# Patient Record
Sex: MALE | Race: WHITE | NOT HISPANIC OR LATINO
[De-identification: names, ages, dates, MRNs, and addresses within clinical notes are randomized per-mention and may not be internally consistent; named-entity substitution may affect disease eponyms.]

---

## 2018-05-28 ENCOUNTER — TRANSCRIPTION ENCOUNTER (OUTPATIENT)
Age: 63
End: 2018-05-28

## 2018-07-14 ENCOUNTER — EMERGENCY (EMERGENCY)
Facility: HOSPITAL | Age: 63
LOS: 1 days | Discharge: HOME | End: 2018-07-14
Attending: EMERGENCY MEDICINE
Payer: COMMERCIAL

## 2018-07-14 VITALS
HEART RATE: 58 BPM | RESPIRATION RATE: 18 BRPM | SYSTOLIC BLOOD PRESSURE: 192 MMHG | TEMPERATURE: 98 F | OXYGEN SATURATION: 98 % | DIASTOLIC BLOOD PRESSURE: 91 MMHG

## 2018-07-14 PROBLEM — Z00.00 ENCOUNTER FOR PREVENTIVE HEALTH EXAMINATION: Status: ACTIVE | Noted: 2018-07-14

## 2018-07-14 PROCEDURE — 69200 CLEAR OUTER EAR CANAL: CPT | Mod: LT

## 2018-07-14 PROCEDURE — 99282 EMERGENCY DEPT VISIT SF MDM: CPT | Mod: 25

## 2018-07-14 PROCEDURE — 69200 CLEAR OUTER EAR CANAL: CPT

## 2018-07-14 NOTE — ED PROCEDURE NOTE - ATTENDING CONTRIBUTION TO CARE
I was physically present for the E/M service provided. I was physically present for the key portions of the service provided. ANCA.

## 2018-07-14 NOTE — ED PROCEDURE NOTE - CPROC ED FOREIGN BODY DETAIL1
The area was draped and prepped and the anatomic location of the suspected foreign body was explored in a bloodless field./tweezers used to remove rubber hearing-aid tip

## 2018-07-14 NOTE — ED PROVIDER NOTE - OBJECTIVE STATEMENT
62y m w PMHx hearing loss p/w foreign body sensation in L ear after the tip of his hearing-aid dislodged while taking them out this evening. Patient denies any pain, but feels as though he has the tip in his ear; he denies seeing it fall out throughout the day or this evening.

## 2018-07-14 NOTE — ED PROVIDER NOTE - PROGRESS NOTE DETAILS
FB visualized in L ear canal, removed w/ tweezers. No erythema or signs of trauma to ear canal or TM, TM intact. Pt stable for dc- [place]

## 2018-07-14 NOTE — ED PROVIDER NOTE - ATTENDING CONTRIBUTION TO CARE
Left Ear: Ear cancel clear s/p uncomplicated FB removal with tweezers per note, TM clear with normal light reflex.

## 2018-10-29 ENCOUNTER — TRANSCRIPTION ENCOUNTER (OUTPATIENT)
Age: 63
End: 2018-10-29

## 2018-11-13 ENCOUNTER — TRANSCRIPTION ENCOUNTER (OUTPATIENT)
Age: 63
End: 2018-11-13

## 2019-03-08 ENCOUNTER — TRANSCRIPTION ENCOUNTER (OUTPATIENT)
Age: 64
End: 2019-03-08

## 2019-05-23 ENCOUNTER — TRANSCRIPTION ENCOUNTER (OUTPATIENT)
Age: 64
End: 2019-05-23

## 2019-06-04 ENCOUNTER — OUTPATIENT (OUTPATIENT)
Dept: OUTPATIENT SERVICES | Facility: HOSPITAL | Age: 64
LOS: 1 days | End: 2019-06-04
Payer: COMMERCIAL

## 2019-06-04 VITALS
OXYGEN SATURATION: 100 % | TEMPERATURE: 98 F | HEIGHT: 72 IN | DIASTOLIC BLOOD PRESSURE: 92 MMHG | SYSTOLIC BLOOD PRESSURE: 161 MMHG | WEIGHT: 220.9 LBS | RESPIRATION RATE: 16 BRPM | HEART RATE: 66 BPM

## 2019-06-04 DIAGNOSIS — Z87.898 PERSONAL HISTORY OF OTHER SPECIFIED CONDITIONS: Chronic | ICD-10-CM

## 2019-06-04 DIAGNOSIS — Z98.49 CATARACT EXTRACTION STATUS, UNSPECIFIED EYE: Chronic | ICD-10-CM

## 2019-06-04 DIAGNOSIS — Z98.890 OTHER SPECIFIED POSTPROCEDURAL STATES: Chronic | ICD-10-CM

## 2019-06-04 DIAGNOSIS — M86.9 OSTEOMYELITIS, UNSPECIFIED: ICD-10-CM

## 2019-06-04 DIAGNOSIS — Z98.84 BARIATRIC SURGERY STATUS: ICD-10-CM

## 2019-06-04 DIAGNOSIS — Z01.818 ENCOUNTER FOR OTHER PREPROCEDURAL EXAMINATION: ICD-10-CM

## 2019-06-04 DIAGNOSIS — Z98.84 BARIATRIC SURGERY STATUS: Chronic | ICD-10-CM

## 2019-06-04 LAB
ANION GAP SERPL CALC-SCNC: 15 MMOL/L — SIGNIFICANT CHANGE UP (ref 5–17)
BUN SERPL-MCNC: 53 MG/DL — HIGH (ref 7–23)
CALCIUM SERPL-MCNC: 9.4 MG/DL — SIGNIFICANT CHANGE UP (ref 8.4–10.5)
CHLORIDE SERPL-SCNC: 99 MMOL/L — SIGNIFICANT CHANGE UP (ref 96–108)
CO2 SERPL-SCNC: 23 MMOL/L — SIGNIFICANT CHANGE UP (ref 22–31)
CREAT SERPL-MCNC: 3.15 MG/DL — HIGH (ref 0.5–1.3)
GLUCOSE SERPL-MCNC: 147 MG/DL — HIGH (ref 70–99)
HBA1C BLD-MCNC: 7.3 % — HIGH (ref 4–5.6)
HCT VFR BLD CALC: 37.3 % — LOW (ref 39–50)
HGB BLD-MCNC: 13.2 G/DL — SIGNIFICANT CHANGE UP (ref 13–17)
MCHC RBC-ENTMCNC: 31.7 PG — SIGNIFICANT CHANGE UP (ref 27–34)
MCHC RBC-ENTMCNC: 35.4 GM/DL — SIGNIFICANT CHANGE UP (ref 32–36)
MCV RBC AUTO: 89.4 FL — SIGNIFICANT CHANGE UP (ref 80–100)
PLATELET # BLD AUTO: 210 K/UL — SIGNIFICANT CHANGE UP (ref 150–400)
POTASSIUM SERPL-MCNC: 5 MMOL/L — SIGNIFICANT CHANGE UP (ref 3.5–5.3)
POTASSIUM SERPL-SCNC: 5 MMOL/L — SIGNIFICANT CHANGE UP (ref 3.5–5.3)
RBC # BLD: 4.17 M/UL — LOW (ref 4.2–5.8)
RBC # FLD: 12.6 % — SIGNIFICANT CHANGE UP (ref 10.3–14.5)
SODIUM SERPL-SCNC: 137 MMOL/L — SIGNIFICANT CHANGE UP (ref 135–145)
WBC # BLD: 8.24 K/UL — SIGNIFICANT CHANGE UP (ref 3.8–10.5)
WBC # FLD AUTO: 8.24 K/UL — SIGNIFICANT CHANGE UP (ref 3.8–10.5)

## 2019-06-04 PROCEDURE — 80048 BASIC METABOLIC PNL TOTAL CA: CPT

## 2019-06-04 PROCEDURE — 77002 NEEDLE LOCALIZATION BY XRAY: CPT

## 2019-06-04 PROCEDURE — 43999 UNLISTED PROCEDURE STOMACH: CPT

## 2019-06-04 PROCEDURE — 85027 COMPLETE CBC AUTOMATED: CPT

## 2019-06-04 PROCEDURE — G0463: CPT

## 2019-06-04 PROCEDURE — 83036 HEMOGLOBIN GLYCOSYLATED A1C: CPT

## 2019-06-04 RX ORDER — CHLORHEXIDINE GLUCONATE 213 G/1000ML
1 SOLUTION TOPICAL DAILY
Refills: 0 | Status: DISCONTINUED | OUTPATIENT
Start: 2019-06-12 | End: 2019-06-27

## 2019-06-04 RX ORDER — CEFAZOLIN SODIUM 1 G
2000 VIAL (EA) INJECTION ONCE
Refills: 0 | Status: DISCONTINUED | OUTPATIENT
Start: 2019-06-12 | End: 2019-06-27

## 2019-06-04 RX ORDER — SODIUM CHLORIDE 9 MG/ML
3 INJECTION INTRAMUSCULAR; INTRAVENOUS; SUBCUTANEOUS EVERY 8 HOURS
Refills: 0 | Status: DISCONTINUED | OUTPATIENT
Start: 2019-06-12 | End: 2019-06-27

## 2019-06-04 RX ORDER — LIDOCAINE HCL 20 MG/ML
0.2 VIAL (ML) INJECTION ONCE
Refills: 0 | Status: DISCONTINUED | OUTPATIENT
Start: 2019-06-12 | End: 2019-06-27

## 2019-06-04 NOTE — H&P PST ADULT - NSANTHOSAYNRD_GEN_A_CORE
No. KRYSTIAN screening performed.  STOP BANG Legend: 0-2 = LOW Risk; 3-4 = INTERMEDIATE Risk; 5-8 = HIGH Risk

## 2019-06-04 NOTE — H&P PST ADULT - HISTORY OF PRESENT ILLNESS
This is a 63 year old male with PMH: HTN, HLD, Type 2 Diabetes, s/p ( '2010): Lap Band for Morbicd Obesity: current BMI 30.0; * Lap Band deflated ( [facility]) 5-4-19. Current dx: Osteomyelitis Left 3rd Toe. Scheduled: Left Foot Distal Amputation Toe #3. This is a 63 year old male with PMH:  bilateral Hearing Aids, HTN, HLD, Type 2 Diabetes, s/p ( '2010): Lap Band for Morbicd Obesity: current BMI 30.0; * Lap Band deflated ( [facility]) 6-4-19. Current dx: Osteomyelitis Left 3rd Toe. Scheduled: Left Foot Distal Amputation Toe #3.

## 2019-06-04 NOTE — H&P PST ADULT - NSICDXPASTMEDICALHX_GEN_ALL_CORE_FT
PAST MEDICAL HISTORY:  Hard of hearing PAST MEDICAL HISTORY:  Hard of hearing Bilateral hearing Aids    HTN (hypertension)     Hypothyroidism     Osteomyelitis     Type 2 diabetes mellitus

## 2019-06-04 NOTE — H&P PST ADULT - NSICDXPASTSURGICALHX_GEN_ALL_CORE_FT
PAST SURGICAL HISTORY:  No significant past surgical history PAST SURGICAL HISTORY:  H/O laparoscopic adjustable gastric banding ' 2010    History of foreign body aspiration 7/2018  from Left Ear    S/P cataract extraction '08 and ' 2011   Bilateral    S/P partial thyroidectomy '95  benign

## 2019-06-04 NOTE — H&P PST ADULT - OTHER CARE PROVIDERS
Alfredito Zambrano, cardiologist [phone]               Nolberto Spann, endocrinologist [phone]

## 2019-06-11 ENCOUNTER — TRANSCRIPTION ENCOUNTER (OUTPATIENT)
Age: 64
End: 2019-06-11

## 2019-06-12 ENCOUNTER — OUTPATIENT (OUTPATIENT)
Dept: OUTPATIENT SERVICES | Facility: HOSPITAL | Age: 64
LOS: 1 days | End: 2019-06-12
Payer: COMMERCIAL

## 2019-06-12 ENCOUNTER — RESULT REVIEW (OUTPATIENT)
Age: 64
End: 2019-06-12

## 2019-06-12 VITALS
HEART RATE: 70 BPM | OXYGEN SATURATION: 100 % | WEIGHT: 220.9 LBS | RESPIRATION RATE: 18 BRPM | TEMPERATURE: 98 F | DIASTOLIC BLOOD PRESSURE: 79 MMHG | SYSTOLIC BLOOD PRESSURE: 116 MMHG | HEIGHT: 72 IN

## 2019-06-12 VITALS
HEART RATE: 58 BPM | OXYGEN SATURATION: 100 % | RESPIRATION RATE: 16 BRPM | SYSTOLIC BLOOD PRESSURE: 141 MMHG | DIASTOLIC BLOOD PRESSURE: 72 MMHG

## 2019-06-12 DIAGNOSIS — M86.9 OSTEOMYELITIS, UNSPECIFIED: ICD-10-CM

## 2019-06-12 DIAGNOSIS — Z98.890 OTHER SPECIFIED POSTPROCEDURAL STATES: Chronic | ICD-10-CM

## 2019-06-12 DIAGNOSIS — Z87.898 PERSONAL HISTORY OF OTHER SPECIFIED CONDITIONS: Chronic | ICD-10-CM

## 2019-06-12 DIAGNOSIS — Z98.49 CATARACT EXTRACTION STATUS, UNSPECIFIED EYE: Chronic | ICD-10-CM

## 2019-06-12 DIAGNOSIS — Z98.84 BARIATRIC SURGERY STATUS: Chronic | ICD-10-CM

## 2019-06-12 LAB — GLUCOSE BLDC GLUCOMTR-MCNC: 147 MG/DL — HIGH (ref 70–99)

## 2019-06-12 PROCEDURE — 88304 TISSUE EXAM BY PATHOLOGIST: CPT | Mod: 26

## 2019-06-12 PROCEDURE — 88311 DECALCIFY TISSUE: CPT | Mod: 26

## 2019-06-12 RX ORDER — ONDANSETRON 8 MG/1
4 TABLET, FILM COATED ORAL ONCE
Refills: 0 | Status: COMPLETED | OUTPATIENT
Start: 2019-06-12 | End: 2019-06-12

## 2019-06-12 RX ORDER — SODIUM CHLORIDE 9 MG/ML
1000 INJECTION, SOLUTION INTRAVENOUS
Refills: 0 | Status: DISCONTINUED | OUTPATIENT
Start: 2019-06-12 | End: 2019-06-27

## 2019-06-12 RX ORDER — OXYCODONE HYDROCHLORIDE 5 MG/1
5 TABLET ORAL ONCE
Refills: 0 | Status: DISCONTINUED | OUTPATIENT
Start: 2019-06-12 | End: 2019-06-12

## 2019-06-12 RX ADMIN — ONDANSETRON 4 MILLIGRAM(S): 8 TABLET, FILM COATED ORAL at 16:37

## 2019-06-12 RX ADMIN — SODIUM CHLORIDE 3 MILLILITER(S): 9 INJECTION INTRAMUSCULAR; INTRAVENOUS; SUBCUTANEOUS at 13:44

## 2019-06-12 RX ADMIN — CHLORHEXIDINE GLUCONATE 1 APPLICATION(S): 213 SOLUTION TOPICAL at 13:44

## 2019-06-12 RX ADMIN — OXYCODONE HYDROCHLORIDE 5 MILLIGRAM(S): 5 TABLET ORAL at 16:36

## 2019-06-12 NOTE — ASU DISCHARGE PLAN (ADULT/PEDIATRIC) - CARE PROVIDER_API CALL
Parris Falcon (DPM)  Podiatric Medicine and Surgery  [address] [place] [address]  Phone: [phone]  Fax: [phone]  Follow Up Time:

## 2019-06-12 NOTE — ASU PATIENT PROFILE, ADULT - PSH
H/O laparoscopic adjustable gastric banding  ' 2010  History of foreign body aspiration  7/2018  from Left Ear  S/P cataract extraction  '08 and ' 2011   Bilateral  S/P partial thyroidectomy  '95  benign

## 2019-06-12 NOTE — ASU PATIENT PROFILE, ADULT - PMH
Hard of hearing  Bilateral hearing Aids  HTN (hypertension)    Hypothyroidism    Osteomyelitis    Type 2 diabetes mellitus

## 2019-06-12 NOTE — ASU DISCHARGE PLAN (ADULT/PEDIATRIC) - CALL YOUR DOCTOR IF YOU HAVE ANY OF THE FOLLOWING:
Pain not relieved by Medications/Fever greater than (need to indicate Fahrenheit or Celsius)/Swelling that gets worse/Bleeding that does not stop/Numbness, tingling, color or temperature change to extremity

## 2019-06-12 NOTE — PRE-ANESTHESIA EVALUATION ADULT - NSANTHADDINFOFT_GEN_ALL_CORE
Denies CP, SOB, cough, fever, acid reflux Denies CP, SOB, cough, fever, acid reflux  optimized by PCP, EKG sinus arrythmia, RBBB

## 2019-06-12 NOTE — ASU DISCHARGE PLAN (ADULT/PEDIATRIC) - NURSING INSTRUCTIONS
Next dose of narcotic pain meds can be take  according to prescription every 4-6 hrs as prescribed by your doctor.

## 2019-06-13 LAB
GRAM STN FLD: SIGNIFICANT CHANGE UP
GRAM STN FLD: SIGNIFICANT CHANGE UP
SPECIMEN SOURCE: SIGNIFICANT CHANGE UP
SPECIMEN SOURCE: SIGNIFICANT CHANGE UP

## 2019-06-14 LAB
-  AMPICILLIN/SULBACTAM: SIGNIFICANT CHANGE UP
-  CEFAZOLIN: SIGNIFICANT CHANGE UP
-  CLINDAMYCIN: SIGNIFICANT CHANGE UP
-  ERYTHROMYCIN: SIGNIFICANT CHANGE UP
-  GENTAMICIN: SIGNIFICANT CHANGE UP
-  OXACILLIN: SIGNIFICANT CHANGE UP
-  PENICILLIN: SIGNIFICANT CHANGE UP
-  RIFAMPIN: SIGNIFICANT CHANGE UP
-  TETRACYCLINE: SIGNIFICANT CHANGE UP
-  TRIMETHOPRIM/SULFAMETHOXAZOLE: SIGNIFICANT CHANGE UP
-  VANCOMYCIN: SIGNIFICANT CHANGE UP
METHOD TYPE: SIGNIFICANT CHANGE UP

## 2019-06-16 LAB
-  AMPICILLIN/SULBACTAM: SIGNIFICANT CHANGE UP
-  CEFAZOLIN: SIGNIFICANT CHANGE UP
-  CLINDAMYCIN: SIGNIFICANT CHANGE UP
-  ERYTHROMYCIN: SIGNIFICANT CHANGE UP
-  GENTAMICIN: SIGNIFICANT CHANGE UP
-  OXACILLIN: SIGNIFICANT CHANGE UP
-  RIFAMPIN: SIGNIFICANT CHANGE UP
-  TETRACYCLINE: SIGNIFICANT CHANGE UP
-  TRIMETHOPRIM/SULFAMETHOXAZOLE: SIGNIFICANT CHANGE UP
-  VANCOMYCIN: SIGNIFICANT CHANGE UP
METHOD TYPE: SIGNIFICANT CHANGE UP

## 2019-06-17 LAB
CULTURE RESULTS: SIGNIFICANT CHANGE UP
CULTURE RESULTS: SIGNIFICANT CHANGE UP
ORGANISM # SPEC MICROSCOPIC CNT: SIGNIFICANT CHANGE UP
SPECIMEN SOURCE: SIGNIFICANT CHANGE UP
SPECIMEN SOURCE: SIGNIFICANT CHANGE UP

## 2019-06-17 PROCEDURE — 88304 TISSUE EXAM BY PATHOLOGIST: CPT

## 2019-06-17 PROCEDURE — 88311 DECALCIFY TISSUE: CPT

## 2019-06-17 PROCEDURE — 87186 SC STD MICRODIL/AGAR DIL: CPT

## 2019-06-17 PROCEDURE — 28124 PARTIAL REMOVAL OF TOE: CPT | Mod: T2

## 2019-06-17 PROCEDURE — 82962 GLUCOSE BLOOD TEST: CPT

## 2019-06-17 PROCEDURE — 87070 CULTURE OTHR SPECIMN AEROBIC: CPT

## 2019-10-24 PROBLEM — E03.9 HYPOTHYROIDISM, UNSPECIFIED: Chronic | Status: ACTIVE | Noted: 2019-06-04

## 2019-10-24 PROBLEM — E11.9 TYPE 2 DIABETES MELLITUS WITHOUT COMPLICATIONS: Chronic | Status: ACTIVE | Noted: 2019-06-04

## 2019-10-24 PROBLEM — H91.90 UNSPECIFIED HEARING LOSS, UNSPECIFIED EAR: Chronic | Status: ACTIVE | Noted: 2018-07-14

## 2019-10-24 PROBLEM — M86.9 OSTEOMYELITIS, UNSPECIFIED: Chronic | Status: ACTIVE | Noted: 2019-06-04

## 2019-10-24 PROBLEM — I10 ESSENTIAL (PRIMARY) HYPERTENSION: Chronic | Status: ACTIVE | Noted: 2019-06-04

## 2019-10-25 ENCOUNTER — APPOINTMENT (OUTPATIENT)
Dept: ULTRASOUND IMAGING | Facility: CLINIC | Age: 64
End: 2019-10-25
Payer: COMMERCIAL

## 2019-10-25 ENCOUNTER — OUTPATIENT (OUTPATIENT)
Dept: OUTPATIENT SERVICES | Facility: HOSPITAL | Age: 64
LOS: 1 days | End: 2019-10-25
Payer: COMMERCIAL

## 2019-10-25 DIAGNOSIS — Z00.8 ENCOUNTER FOR OTHER GENERAL EXAMINATION: ICD-10-CM

## 2019-10-25 DIAGNOSIS — Z98.890 OTHER SPECIFIED POSTPROCEDURAL STATES: Chronic | ICD-10-CM

## 2019-10-25 DIAGNOSIS — Z98.49 CATARACT EXTRACTION STATUS, UNSPECIFIED EYE: Chronic | ICD-10-CM

## 2019-10-25 DIAGNOSIS — Z98.84 BARIATRIC SURGERY STATUS: Chronic | ICD-10-CM

## 2019-10-25 DIAGNOSIS — Z87.898 PERSONAL HISTORY OF OTHER SPECIFIED CONDITIONS: Chronic | ICD-10-CM

## 2019-10-25 PROCEDURE — 76775 US EXAM ABDO BACK WALL LIM: CPT

## 2019-10-25 PROCEDURE — 76775 US EXAM ABDO BACK WALL LIM: CPT | Mod: 26

## 2020-03-19 NOTE — ASU PATIENT PROFILE, ADULT - PAIN RATING AT REST
0 Home Suture Removal Text: Patient was provided instructions on removing sutures and will remove their sutures at home.  If they have any questions or difficulties they will call the office.

## 2020-11-17 ENCOUNTER — APPOINTMENT (OUTPATIENT)
Dept: TRANSPLANT | Facility: CLINIC | Age: 65
End: 2020-11-17
Payer: COMMERCIAL

## 2020-11-17 ENCOUNTER — APPOINTMENT (OUTPATIENT)
Dept: TRANSPLANT | Facility: CLINIC | Age: 65
End: 2020-11-17

## 2020-11-17 ENCOUNTER — LABORATORY RESULT (OUTPATIENT)
Age: 65
End: 2020-11-17

## 2020-11-17 ENCOUNTER — APPOINTMENT (OUTPATIENT)
Dept: NEPHROLOGY | Facility: CLINIC | Age: 65
End: 2020-11-17
Payer: COMMERCIAL

## 2020-11-17 VITALS
WEIGHT: 224 LBS | HEART RATE: 63 BPM | SYSTOLIC BLOOD PRESSURE: 166 MMHG | TEMPERATURE: 97.5 F | HEIGHT: 72 IN | DIASTOLIC BLOOD PRESSURE: 80 MMHG | RESPIRATION RATE: 14 BRPM | BODY MASS INDEX: 30.34 KG/M2

## 2020-11-17 VITALS
BODY MASS INDEX: 30.34 KG/M2 | HEART RATE: 63 BPM | OXYGEN SATURATION: 100 % | WEIGHT: 224 LBS | HEIGHT: 72 IN | TEMPERATURE: 97.5 F

## 2020-11-17 PROCEDURE — 99205 OFFICE O/P NEW HI 60 MIN: CPT

## 2020-11-17 PROCEDURE — 99072 ADDL SUPL MATRL&STAF TM PHE: CPT

## 2020-11-17 PROCEDURE — 99203 OFFICE O/P NEW LOW 30 MIN: CPT | Mod: 25

## 2020-11-17 NOTE — HISTORY OF PRESENT ILLNESS
[FreeTextEntry1] : 65 years old male, born in [place].\par Patient has known CKD (), on follow up with  is here for pre kidney transplant evaluation. \par He is a preemptive candidate.\par He has known DM (age 37) Not on insulin ; HTN (age 47). H/o Hyperlipidemia, controlled on statin / \par No h/o Gout\par Had gastric lap band (), now deflated (2019)\par No known h/o kidney stone/ Prostatism.\par No hematuria/Transfusions\par Nocturia: once may be\par Has no h/o  UTI. H/o pneumonia once in \par Has arrhythmia on metoprolol.\par No known h/o active CAD/CVA/PVD/DVT/neoplasia/active infections/bleeding.\par Scheduled for bone marrow on Dec 1st, 2020 for evaluation to r/o myeloma\par Reports no medication allergies. But intolerant to aspirin, Vasotec, metformin (aspirin bleeding, Vasotec and metformin elevated liver enzymes). Does not take any blood thinners. No known h/o tuberculosis or hepatitis.\par Most recent hospitalization/for:2019 left foot 3rd toe tip amputation, for infection\par Past surgeries:\par Lap Band ()\par Left 3rd toe tip excision\par Thyroid excision (- benign)\par Kidney biopsy  (proteinuria, obesity)\par No history of kidney/ bladder/ prostate surgery.\par Non smoker. Quit in , < 1ppd prior\par Fam: Parents are . Father -  at 70 Mother-  at 60  Siblings- 1 sister  at 66; Araceli aged 65, healthy.\par Children: None\par Lives with wife, Bruce.\par No family history of kidney disease- Mother had deformed kidney, mother's cousin had CKD Sister had kidney transplant in  and  in \par Independent for ADL\par Able to walk 10 blocks, can climb stairs without difficulty.\par ROS: Has h/o shortness of breath on exertion. Has h/o Sleep apnea.Does not use CPAP.  On Thyroid supplement after excision.\par Functional/employment status: Works full time for [employer]\par Dialysis history:None\par Kidney Biopsy:in the \par Potential Live donors: Sister, Araceli Cardoso\par \par Prior Studies:\par Cardiology:[facility]\par Cancer Screen:Scheduled for bone marrow\par Consultants:Robert Weiss (GI- Colonoscopy in 2018); Dr. Reba Javier (hematologist)\par Primary MD:Dr. Randle\par \par Meds:\par Repaglinide\par Losartan\par Metoprolol\par Levothyroxine\par Sod bicarb\par Simvastatin\par

## 2020-11-17 NOTE — HISTORY OF PRESENT ILLNESS
[Diabetes Mellitus] : Diabetes Mellitus [Hypertension] : Hypertension [Previous Kidney Transplant] : no previous kidney transplant [Cardiac History] : cardiac history [TextBox_42] : 65 years old male, with CKD presenting today for kidney transplant evaluation\par he has long history of diabetes; for over 40 years\par also long history of hypertension; for about 20 years\par morbid obesity; had gastric lap band in  and lost about 130 lbs\par he had a kidney biopsy in ; was told the kidney disease is secondary to the obesity\par he had a cardiac cath in  (for abnormal stress test) and was told it was "clean"\par thyroidectomy in - benign\par in 2019 he had a foot infection involving the left middle tow and he had an amputation of the distal phalanx of the left middle toe\par cataract in  and \par he is currently following with hematology  for abnormal protein in blood and planned for a bone marrow biopsy in two weeks to rule out multiple myeloma\par he gives history of GI bleeding following taking aspirin; and he had EGD and was told to stop taking aspirin \par \par family history; both parents  and both had DM and HTN and father had CAD. he has two sisters one of them had a kidney transplant. \par \par he is  and has no children; he works in finance. he stopped smoking in  [TextBox_16] : pre emptive [TextBox_24] : 2000 [TextBox_28] : 40 years ago

## 2020-11-17 NOTE — PHYSICAL EXAM
[General Appearance - Alert] : alert [General Appearance - In No Acute Distress] : in no acute distress [Sclera] : the sclera and conjunctiva were normal [PERRL With Normal Accommodation] : pupils were equal in size, round, and reactive to light [Extraocular Movements] : extraocular movements were intact [Outer Ear] : the ears and nose were normal in appearance [Oropharynx] : the oropharynx was normal [Neck Appearance] : the appearance of the neck was normal [Neck Cervical Mass (___cm)] : no neck mass was observed [Jugular Venous Distention Increased] : there was no jugular-venous distention [Thyroid Diffuse Enlargement] : the thyroid was not enlarged [Thyroid Nodule] : there were no palpable thyroid nodules [Auscultation Breath Sounds / Voice Sounds] : lungs were clear to auscultation bilaterally [Full Pulse] : the pedal pulses are present [Bowel Sounds] : normal bowel sounds [Abdomen Soft] : soft [Abdomen Tenderness] : non-tender [Cervical Lymph Nodes Enlarged Posterior Bilaterally] : posterior cervical [Cervical Lymph Nodes Enlarged Anterior Bilaterally] : anterior cervical [Supraclavicular Lymph Nodes Enlarged Bilaterally] : supraclavicular [Axillary Lymph Nodes Enlarged Bilaterally] : axillary [Femoral Lymph Nodes Enlarged Bilaterally] : femoral [Inguinal Lymph Nodes Enlarged Bilaterally] : inguinal [Involuntary Movements] : no involuntary movements were seen [] : no rash [FreeTextEntry1] : no acute signs [Oriented To Time, Place, And Person] : oriented to person, place, and time [Impaired Insight] : insight and judgment were intact [Affect] : the affect was normal

## 2020-11-17 NOTE — ASSESSMENT
[Good candidate] : a good candidate. We should proceed with our protocol for evaluation for kidney transplantation. [FreeTextEntry1] : 65 years old male with long history of diabetes and hypertension\par he is not on dialysis yet\par he is a reasonable candidate for evaluation\par he will be better served with a living donor transplantation

## 2020-11-17 NOTE — PHYSICAL EXAM
[LE Edema] : LE edema [] : right dorsalis pedis palpable [Normal] : normal [FreeTextEntry1] : scar of thyroidectomy [TextBox_25] : lap band pump [TextBox_34] : mild edema. well healed stump of toe amputation

## 2020-11-17 NOTE — ASSESSMENT
[FreeTextEntry1] : .Mr. WILSON 65 year He is evaluated for kidney transplantation.\par Pre transplant/ESRD: Patient will benefit from renal allotransplantation. Will need to report of bone marrow study prior to proceeding with transplant\par Medical risks: Cardiovascular, cancer screening.\par Diabetes Mellitus: Discussed implications. Continue follow up with primary physicians\par Hypertension: Discussed implications. Continue follow up with primary physicians.\par Cardiac risk:  will get further evaluation; echo, stress test; Reviewed cardiovascular risk reduction strategies\par Cancer screening: PSA .  Colon saji screening. No known h/o neoplastic disease\par ID: Serology for acute and chronic viral infections. Screening for latent TB \par Imaging: Renal/abdominal /chest /Iliac/ carotids imaging/ Aneurysm screening\par Consults: Nutrition, social work, cardiology, Transplant surgery.\par Reviewed factors affecting survival and morbidity while on wait list and reviewed adelaide-operative and long-term risk factors affecting outcome in kidney transplantation.\par Details of transplant surgery, immunosuppression and its complications and benefits of live donor transplantation as well as variability in wait times across regions and multiple listing were discussed. KDPI >85% and PHS high risk criteria donors were discussed. Discussed factors affecting morbidity and mortality while on hemodialysis.\par Patient has potential live donor (sister ) at present. \par Will proceed with completing/ updating work up and listing for transplant/ live donor transplant once work up is reviewed and found to be ok.\par

## 2020-11-18 ENCOUNTER — NON-APPOINTMENT (OUTPATIENT)
Age: 65
End: 2020-11-18

## 2020-11-18 DIAGNOSIS — Z78.9 OTHER SPECIFIED HEALTH STATUS: ICD-10-CM

## 2020-11-18 DIAGNOSIS — Z82.49 FAMILY HISTORY OF ISCHEMIC HEART DISEASE AND OTHER DISEASES OF THE CIRCULATORY SYSTEM: ICD-10-CM

## 2020-11-18 DIAGNOSIS — I45.9 CONDUCTION DISORDER, UNSPECIFIED: ICD-10-CM

## 2020-11-18 DIAGNOSIS — Z82.3 FAMILY HISTORY OF STROKE: ICD-10-CM

## 2020-11-18 LAB
ABO + RH PNL BLD: NORMAL
ABO + RH PNL BLD: NORMAL
ALBUMIN SERPL ELPH-MCNC: 4.6 G/DL
ALP BLD-CCNC: 129 U/L
ALT SERPL-CCNC: 23 U/L
ANION GAP SERPL CALC-SCNC: 14 MMOL/L
APPEARANCE: CLEAR
AST SERPL-CCNC: 23 U/L
BACTERIA: NEGATIVE
BASOPHILS # BLD AUTO: 0.02 K/UL
BASOPHILS NFR BLD AUTO: 0.3 %
BILIRUB SERPL-MCNC: 0.4 MG/DL
BILIRUBIN URINE: NEGATIVE
BLOOD URINE: NORMAL
BUN SERPL-MCNC: 59 MG/DL
C PEPTIDE SERPL-MCNC: 6.8 NG/ML
CALCIUM SERPL-MCNC: 9.4 MG/DL
CHLORIDE SERPL-SCNC: 99 MMOL/L
CMV IGG SERPL QL: <0.2 U/ML
CMV IGG SERPL-IMP: NEGATIVE
CO2 SERPL-SCNC: 24 MMOL/L
COLOR: NORMAL
CREAT SERPL-MCNC: 3.92 MG/DL
CREAT SPEC-SCNC: 83 MG/DL
CREAT/PROT UR: 5.4 RATIO
EBV EA AB SER IA-ACNC: 9.1 U/ML
EBV EA AB TITR SER IF: POSITIVE
EBV EA IGG SER QL IA: 373 U/ML
EBV EA IGG SER-ACNC: ABNORMAL
EBV EA IGM SER IA-ACNC: NEGATIVE
EBV PATRN SPEC IB-IMP: NORMAL
EBV VCA IGG SER IA-ACNC: 267 U/ML
EBV VCA IGM SER QL IA: <10 U/ML
EOSINOPHIL # BLD AUTO: 0.08 K/UL
EOSINOPHIL NFR BLD AUTO: 1 %
EPSTEIN-BARR VIRUS CAPSID ANTIGEN IGG: POSITIVE
ESTIMATED AVERAGE GLUCOSE: 117 MG/DL
GLUCOSE QUALITATIVE U: NORMAL
GLUCOSE SERPL-MCNC: 119 MG/DL
HAV IGM SER QL: NONREACTIVE
HBA1C MFR BLD HPLC: 5.7 %
HBV CORE IGG+IGM SER QL: NONREACTIVE
HBV SURFACE AB SER QL: NONREACTIVE
HBV SURFACE AB SERPL IA-ACNC: <3 MIU/ML
HBV SURFACE AG SER QL: NONREACTIVE
HCT VFR BLD CALC: 34.3 %
HCV AB SER QL: NONREACTIVE
HCV S/CO RATIO: 0.07 S/CO
HGB BLD-MCNC: 11.6 G/DL
HIV1+2 AB SPEC QL IA.RAPID: NONREACTIVE
HSV 1+2 IGG SER IA-IMP: NEGATIVE
HSV 1+2 IGG SER IA-IMP: NEGATIVE
HSV1 IGG SER QL: 0.12 INDEX
HSV2 IGG SER QL: 0.07 INDEX
HYALINE CASTS: 2 /LPF
IMM GRANULOCYTES NFR BLD AUTO: 0.3 %
KETONES URINE: NEGATIVE
LEUKOCYTE ESTERASE URINE: NEGATIVE
LYMPHOCYTES # BLD AUTO: 1.11 K/UL
LYMPHOCYTES NFR BLD AUTO: 14.3 %
MAGNESIUM SERPL-MCNC: 2.3 MG/DL
MAN DIFF?: NORMAL
MCHC RBC-ENTMCNC: 32 PG
MCHC RBC-ENTMCNC: 33.8 GM/DL
MCV RBC AUTO: 94.8 FL
MICROSCOPIC-UA: NORMAL
MONOCYTES # BLD AUTO: 0.29 K/UL
MONOCYTES NFR BLD AUTO: 3.7 %
NEUTROPHILS # BLD AUTO: 6.24 K/UL
NEUTROPHILS NFR BLD AUTO: 80.4 %
NITRITE URINE: NEGATIVE
PH URINE: 6.5
PHOSPHATE SERPL-MCNC: 4.1 MG/DL
PLATELET # BLD AUTO: 158 K/UL
POTASSIUM SERPL-SCNC: 4.1 MMOL/L
PROT SERPL-MCNC: 7.5 G/DL
PROT UR-MCNC: 444 MG/DL
PROTEIN URINE: ABNORMAL
PSA SERPL-MCNC: 0.67 NG/ML
RBC # BLD: 3.62 M/UL
RBC # FLD: 13 %
RED BLOOD CELLS URINE: 3 /HPF
RUBV IGG FLD-ACNC: >33 INDEX
RUBV IGG SER-IMP: POSITIVE
SARS-COV-2 IGG SERPL IA-ACNC: 0.09 INDEX
SARS-COV-2 IGG SERPL QL IA: NEGATIVE
SODIUM SERPL-SCNC: 138 MMOL/L
SPECIFIC GRAVITY URINE: 1.01
SQUAMOUS EPITHELIAL CELLS: 1 /HPF
T GONDII AB SER-IMP: POSITIVE
T GONDII IGG SER QL: 146 IU/ML
T PALLIDUM AB SER QL IA: NEGATIVE
URATE SERPL-MCNC: 8.8 MG/DL
UROBILINOGEN URINE: NORMAL
VZV AB TITR SER: POSITIVE
VZV IGG SER IF-ACNC: 2913 INDEX
WBC # FLD AUTO: 7.76 K/UL
WHITE BLOOD CELLS URINE: 1 /HPF

## 2020-11-18 RX ORDER — MULTIVITAMIN
TABLET ORAL DAILY
Refills: 0 | Status: ACTIVE | COMMUNITY

## 2020-11-18 RX ORDER — LEVOTHYROXINE SODIUM 0.12 MG/1
125 TABLET ORAL DAILY
Refills: 0 | Status: ACTIVE | COMMUNITY
Start: 2020-10-25

## 2020-11-18 RX ORDER — BIOTIN 10 MG
TABLET ORAL DAILY
Refills: 0 | Status: ACTIVE | COMMUNITY

## 2020-11-18 RX ORDER — FOLIC ACID 20 MG
CAPSULE ORAL DAILY
Refills: 0 | Status: ACTIVE | COMMUNITY

## 2020-11-18 RX ORDER — BLOOD SUGAR DIAGNOSTIC
STRIP MISCELLANEOUS
Qty: 200 | Refills: 0 | Status: ACTIVE | COMMUNITY
Start: 2020-09-12

## 2020-11-18 RX ORDER — LANCETS
EACH MISCELLANEOUS
Qty: 204 | Refills: 0 | Status: ACTIVE | COMMUNITY
Start: 2020-09-10

## 2020-11-19 ENCOUNTER — NON-APPOINTMENT (OUTPATIENT)
Age: 65
End: 2020-11-19

## 2020-11-19 ENCOUNTER — APPOINTMENT (OUTPATIENT)
Dept: CARDIOLOGY | Facility: CLINIC | Age: 65
End: 2020-11-19
Payer: COMMERCIAL

## 2020-11-19 VITALS
BODY MASS INDEX: 30.88 KG/M2 | HEIGHT: 72 IN | OXYGEN SATURATION: 100 % | HEART RATE: 65 BPM | SYSTOLIC BLOOD PRESSURE: 189 MMHG | WEIGHT: 228 LBS | DIASTOLIC BLOOD PRESSURE: 80 MMHG | TEMPERATURE: 96.9 F

## 2020-11-19 DIAGNOSIS — I10 ESSENTIAL (PRIMARY) HYPERTENSION: ICD-10-CM

## 2020-11-19 LAB
M TB IFN-G BLD-IMP: NEGATIVE
QUANTIFERON TB PLUS MITOGEN MINUS NIL: 7.58 IU/ML
QUANTIFERON TB PLUS NIL: 0.02 IU/ML
QUANTIFERON TB PLUS TB1 MINUS NIL: 0 IU/ML
QUANTIFERON TB PLUS TB2 MINUS NIL: -0.01 IU/ML

## 2020-11-19 PROCEDURE — 99203 OFFICE O/P NEW LOW 30 MIN: CPT

## 2020-11-19 PROCEDURE — 93000 ELECTROCARDIOGRAM COMPLETE: CPT

## 2020-11-20 LAB — EBV DNA SERPL NAA+PROBE-ACNC: NOT DETECTED IU/ML

## 2020-12-11 ENCOUNTER — OUTPATIENT (OUTPATIENT)
Dept: OUTPATIENT SERVICES | Facility: HOSPITAL | Age: 65
LOS: 1 days | End: 2020-12-11

## 2020-12-11 ENCOUNTER — APPOINTMENT (OUTPATIENT)
Dept: CV DIAGNOSTICS | Facility: HOSPITAL | Age: 65
End: 2020-12-11
Payer: COMMERCIAL

## 2020-12-11 DIAGNOSIS — Z87.898 PERSONAL HISTORY OF OTHER SPECIFIED CONDITIONS: Chronic | ICD-10-CM

## 2020-12-11 DIAGNOSIS — Z98.84 BARIATRIC SURGERY STATUS: Chronic | ICD-10-CM

## 2020-12-11 DIAGNOSIS — Z01.818 ENCOUNTER FOR OTHER PREPROCEDURAL EXAMINATION: ICD-10-CM

## 2020-12-11 DIAGNOSIS — Z98.890 OTHER SPECIFIED POSTPROCEDURAL STATES: Chronic | ICD-10-CM

## 2020-12-11 DIAGNOSIS — Z98.49 CATARACT EXTRACTION STATUS, UNSPECIFIED EYE: Chronic | ICD-10-CM

## 2020-12-11 PROCEDURE — 93018 CV STRESS TEST I&R ONLY: CPT | Mod: GC

## 2020-12-11 PROCEDURE — 78452 HT MUSCLE IMAGE SPECT MULT: CPT | Mod: 26

## 2020-12-11 PROCEDURE — 93016 CV STRESS TEST SUPVJ ONLY: CPT | Mod: GC

## 2020-12-14 NOTE — PHYSICAL EXAM
[General Appearance - Well Developed] : well developed [Normal Appearance] : normal appearance [Well Groomed] : well groomed [General Appearance - Well Nourished] : well nourished [No Deformities] : no deformities [General Appearance - In No Acute Distress] : no acute distress [Normal Conjunctiva] : the conjunctiva exhibited no abnormalities [Eyelids - No Xanthelasma] : the eyelids demonstrated no xanthelasmas [Normal Oral Mucosa] : normal oral mucosa [No Oral Pallor] : no oral pallor [No Oral Cyanosis] : no oral cyanosis [Heart Rate And Rhythm] : heart rate and rhythm were normal [Heart Sounds] : normal S1 and S2 [Murmurs] : no murmurs present [Respiration, Rhythm And Depth] : normal respiratory rhythm and effort [Exaggerated Use Of Accessory Muscles For Inspiration] : no accessory muscle use [Auscultation Breath Sounds / Voice Sounds] : lungs were clear to auscultation bilaterally [Abdomen Soft] : soft [Abdomen Tenderness] : non-tender [Abnormal Walk] : normal gait [Cyanosis, Localized] : no localized cyanosis [Skin Color & Pigmentation] : normal skin color and pigmentation [] : no rash [Oriented To Time, Place, And Person] : oriented to person, place, and time [No Anxiety] : not feeling anxious [FreeTextEntry1] : bilateral trace LE edema

## 2020-12-14 NOTE — ADDENDUM
[FreeTextEntry1] : CARDIAC TESTING:\par 12/11/20 (exercise tetrofosmin): 4 METS, 96% MPHR, 04:20 min, HTN response 199/93 mmHg, SOB, no evidence of infarction or inducible ischemia, LVEF 56%\par 07/30/20 ECHO: mild MAC, mild LAE, diastolic dysfunction, LVEF 73%\par \par CARDIAC CLEARANCE:\par At this time, patient is considered an acceptable risk from a cardiac standpoint for renal transplant. Follow up annually pre-transplant.

## 2020-12-14 NOTE — HISTORY OF PRESENT ILLNESS
[FreeTextEntry1] : Patient with DM, HTN, and CKD. Conditions have been stable. Currently doing okay and denies chest pain, shortness of breath or palpitations.

## 2020-12-14 NOTE — DISCUSSION/SUMMARY
[Hypertension] : hypertension [FreeTextEntry1] : \par Currently stable from a cardiovascular standpoint. Hypertensive today. Slight volume overload. No ischemic or CHF symptoms. Patient not currently on dialysis. Continue current medications. ECG completed today and reviewed (findings as noted above). Will schedule an exercise nuclear stress test to rule out any significant CAD. Pending test results, I will make further recommendations. Patient to have recent echo forwarded for review. Patient to have stress testing with primary cardiologist.

## 2020-12-15 ENCOUNTER — APPOINTMENT (OUTPATIENT)
Dept: CT IMAGING | Facility: IMAGING CENTER | Age: 65
End: 2020-12-15
Payer: COMMERCIAL

## 2020-12-15 ENCOUNTER — APPOINTMENT (OUTPATIENT)
Dept: RADIOLOGY | Facility: IMAGING CENTER | Age: 65
End: 2020-12-15
Payer: COMMERCIAL

## 2020-12-15 ENCOUNTER — RESULT REVIEW (OUTPATIENT)
Age: 65
End: 2020-12-15

## 2020-12-15 ENCOUNTER — OUTPATIENT (OUTPATIENT)
Dept: OUTPATIENT SERVICES | Facility: HOSPITAL | Age: 65
LOS: 1 days | End: 2020-12-15
Payer: COMMERCIAL

## 2020-12-15 DIAGNOSIS — Z00.8 ENCOUNTER FOR OTHER GENERAL EXAMINATION: ICD-10-CM

## 2020-12-15 DIAGNOSIS — Z98.49 CATARACT EXTRACTION STATUS, UNSPECIFIED EYE: Chronic | ICD-10-CM

## 2020-12-15 DIAGNOSIS — Z98.890 OTHER SPECIFIED POSTPROCEDURAL STATES: Chronic | ICD-10-CM

## 2020-12-15 DIAGNOSIS — Z98.84 BARIATRIC SURGERY STATUS: Chronic | ICD-10-CM

## 2020-12-15 DIAGNOSIS — Z87.898 PERSONAL HISTORY OF OTHER SPECIFIED CONDITIONS: Chronic | ICD-10-CM

## 2020-12-15 PROCEDURE — 74176 CT ABD & PELVIS W/O CONTRAST: CPT | Mod: 26

## 2020-12-15 PROCEDURE — 74176 CT ABD & PELVIS W/O CONTRAST: CPT

## 2020-12-15 PROCEDURE — 71046 X-RAY EXAM CHEST 2 VIEWS: CPT | Mod: 26

## 2020-12-15 PROCEDURE — 71046 X-RAY EXAM CHEST 2 VIEWS: CPT

## 2020-12-16 ENCOUNTER — APPOINTMENT (OUTPATIENT)
Dept: CARDIOLOGY | Facility: CLINIC | Age: 65
End: 2020-12-16

## 2020-12-25 ENCOUNTER — TRANSCRIPTION ENCOUNTER (OUTPATIENT)
Age: 65
End: 2020-12-25

## 2021-03-22 ENCOUNTER — APPOINTMENT (OUTPATIENT)
Dept: TRANSPLANT | Facility: CLINIC | Age: 66
End: 2021-03-22

## 2021-04-22 ENCOUNTER — APPOINTMENT (OUTPATIENT)
Dept: TRANSPLANT | Facility: CLINIC | Age: 66
End: 2021-04-22

## 2021-04-23 ENCOUNTER — APPOINTMENT (OUTPATIENT)
Dept: TRANSPLANT | Facility: CLINIC | Age: 66
End: 2021-04-23
Payer: COMMERCIAL

## 2021-04-23 PROCEDURE — 99001T: CUSTOM

## 2021-05-21 ENCOUNTER — APPOINTMENT (OUTPATIENT)
Dept: TRANSPLANT | Facility: CLINIC | Age: 66
End: 2021-05-21
Payer: COMMERCIAL

## 2021-05-21 ENCOUNTER — APPOINTMENT (OUTPATIENT)
Dept: TRANSPLANT | Facility: CLINIC | Age: 66
End: 2021-05-21

## 2021-05-21 PROCEDURE — 99001T: CUSTOM

## 2021-05-21 PROCEDURE — 86832 HLA CLASS I HIGH DEFIN QUAL: CPT

## 2021-05-21 PROCEDURE — 86833 HLA CLASS II HIGH DEFIN QUAL: CPT

## 2021-06-22 ENCOUNTER — APPOINTMENT (OUTPATIENT)
Dept: TRANSPLANT | Facility: CLINIC | Age: 66
End: 2021-06-22

## 2021-06-23 ENCOUNTER — APPOINTMENT (OUTPATIENT)
Dept: TRANSPLANT | Facility: CLINIC | Age: 66
End: 2021-06-23
Payer: COMMERCIAL

## 2021-06-23 PROCEDURE — 99001T: CUSTOM

## 2021-07-22 ENCOUNTER — APPOINTMENT (OUTPATIENT)
Dept: TRANSPLANT | Facility: CLINIC | Age: 66
End: 2021-07-22

## 2021-07-23 ENCOUNTER — APPOINTMENT (OUTPATIENT)
Dept: TRANSPLANT | Facility: CLINIC | Age: 66
End: 2021-07-23
Payer: COMMERCIAL

## 2021-07-23 PROCEDURE — 99001T: CUSTOM

## 2021-08-20 ENCOUNTER — APPOINTMENT (OUTPATIENT)
Dept: TRANSPLANT | Facility: CLINIC | Age: 66
End: 2021-08-20
Payer: COMMERCIAL

## 2021-08-20 PROCEDURE — 99001T: CUSTOM

## 2021-08-23 ENCOUNTER — APPOINTMENT (OUTPATIENT)
Dept: TRANSPLANT | Facility: CLINIC | Age: 66
End: 2021-08-23

## 2021-09-21 ENCOUNTER — APPOINTMENT (OUTPATIENT)
Dept: TRANSPLANT | Facility: CLINIC | Age: 66
End: 2021-09-21
Payer: COMMERCIAL

## 2021-09-21 PROCEDURE — 99001T: CUSTOM

## 2021-09-22 ENCOUNTER — APPOINTMENT (OUTPATIENT)
Dept: TRANSPLANT | Facility: CLINIC | Age: 66
End: 2021-09-22

## 2021-10-13 ENCOUNTER — TRANSCRIPTION ENCOUNTER (OUTPATIENT)
Age: 66
End: 2021-10-13

## 2021-10-20 ENCOUNTER — APPOINTMENT (OUTPATIENT)
Dept: TRANSPLANT | Facility: CLINIC | Age: 66
End: 2021-10-20

## 2021-10-21 ENCOUNTER — APPOINTMENT (OUTPATIENT)
Dept: TRANSPLANT | Facility: CLINIC | Age: 66
End: 2021-10-21
Payer: COMMERCIAL

## 2021-10-21 PROCEDURE — 99001T: CUSTOM

## 2021-11-22 ENCOUNTER — APPOINTMENT (OUTPATIENT)
Dept: TRANSPLANT | Facility: CLINIC | Age: 66
End: 2021-11-22

## 2021-11-23 ENCOUNTER — APPOINTMENT (OUTPATIENT)
Dept: TRANSPLANT | Facility: CLINIC | Age: 66
End: 2021-11-23
Payer: COMMERCIAL

## 2021-11-23 PROCEDURE — 86832 HLA CLASS I HIGH DEFIN QUAL: CPT

## 2021-11-23 PROCEDURE — 99001T: CUSTOM

## 2021-11-23 PROCEDURE — 86833 HLA CLASS II HIGH DEFIN QUAL: CPT

## 2021-12-20 ENCOUNTER — APPOINTMENT (OUTPATIENT)
Dept: TRANSPLANT | Facility: CLINIC | Age: 66
End: 2021-12-20

## 2021-12-21 ENCOUNTER — APPOINTMENT (OUTPATIENT)
Dept: TRANSPLANT | Facility: CLINIC | Age: 66
End: 2021-12-21
Payer: COMMERCIAL

## 2021-12-21 PROCEDURE — 99001T: CUSTOM

## 2022-01-20 ENCOUNTER — APPOINTMENT (OUTPATIENT)
Dept: TRANSPLANT | Facility: CLINIC | Age: 67
End: 2022-01-20

## 2022-01-21 ENCOUNTER — APPOINTMENT (OUTPATIENT)
Dept: TRANSPLANT | Facility: CLINIC | Age: 67
End: 2022-01-21
Payer: COMMERCIAL

## 2022-01-21 PROCEDURE — 99001T: CUSTOM

## 2022-02-09 NOTE — H&P PST ADULT - RESPIRATORY RATE (BREATHS/MIN)
Medical Necessity Information: It is in your best interest to select a reason for this procedure from the list below. All of these items fulfill various CMS LCD requirements except the new and changing color options. Render Post-Care Instructions In Note?: no Post-Care Instructions: I reviewed with the patient in detail post-care instructions. Patient is to wear sunprotection, and avoid picking at any of the treated lesions. Pt may apply Vaseline to crusted or scabbing areas. Number Of Freeze-Thaw Cycles: 3 freeze-thaw cycles Medical Necessity Clause: This procedure was medically necessary because the lesions that were treated were: Consent: The patient's consent was obtained including but not limited to risks of crusting, scabbing, blistering, scarring, darker or lighter pigmentary change, recurrence, incomplete removal and infection. Detail Level: Detailed 16

## 2022-02-18 ENCOUNTER — APPOINTMENT (OUTPATIENT)
Dept: TRANSPLANT | Facility: CLINIC | Age: 67
End: 2022-02-18

## 2022-03-22 ENCOUNTER — APPOINTMENT (OUTPATIENT)
Dept: TRANSPLANT | Facility: CLINIC | Age: 67
End: 2022-03-22

## 2022-04-22 ENCOUNTER — APPOINTMENT (OUTPATIENT)
Dept: TRANSPLANT | Facility: CLINIC | Age: 67
End: 2022-04-22

## 2022-05-17 NOTE — ED PROVIDER NOTE - MEDICAL DECISION MAKING DETAILS
62y m w PMHx hearing loss p/w foreign body sensation in L ear after the tip of his hearing-aid dislodged while taking them out this evening. Patient denies any pain, but feels as though he has the tip in his ear; he denies seeing it fall out throughout the day or this evening.  Will remove FB if present in L ear canal -Clint
Female

## 2022-05-20 ENCOUNTER — APPOINTMENT (OUTPATIENT)
Dept: TRANSPLANT | Facility: CLINIC | Age: 67
End: 2022-05-20

## 2022-06-07 ENCOUNTER — APPOINTMENT (OUTPATIENT)
Dept: VASCULAR SURGERY | Facility: CLINIC | Age: 67
End: 2022-06-07

## 2022-06-30 ENCOUNTER — NON-APPOINTMENT (OUTPATIENT)
Age: 67
End: 2022-06-30

## 2022-06-30 ENCOUNTER — APPOINTMENT (OUTPATIENT)
Dept: NEPHROLOGY | Facility: CLINIC | Age: 67
End: 2022-06-30

## 2022-06-30 VITALS
BODY MASS INDEX: 31.29 KG/M2 | SYSTOLIC BLOOD PRESSURE: 170 MMHG | DIASTOLIC BLOOD PRESSURE: 75 MMHG | TEMPERATURE: 98 F | OXYGEN SATURATION: 98 % | WEIGHT: 231 LBS | HEIGHT: 72 IN | RESPIRATION RATE: 14 BRPM | HEART RATE: 83 BPM

## 2022-06-30 DIAGNOSIS — J45.998 OTHER ASTHMA: ICD-10-CM

## 2022-06-30 PROCEDURE — 99213 OFFICE O/P EST LOW 20 MIN: CPT

## 2022-06-30 PROCEDURE — 99072 ADDL SUPL MATRL&STAF TM PHE: CPT

## 2022-06-30 RX ORDER — LOSARTAN POTASSIUM 100 MG/1
100 TABLET, FILM COATED ORAL DAILY
Refills: 0 | Status: DISCONTINUED | COMMUNITY
Start: 2020-10-25 | End: 2022-06-30

## 2022-06-30 RX ORDER — HYDROCHLOROTHIAZIDE 25 MG/1
25 TABLET ORAL DAILY
Refills: 0 | Status: DISCONTINUED | COMMUNITY
Start: 2020-11-10 | End: 2022-06-30

## 2022-06-30 RX ORDER — SODIUM FLUORIDE 6 MG/ML
1.1 PASTE, DENTIFRICE DENTAL
Qty: 100 | Refills: 0 | Status: DISCONTINUED | COMMUNITY
Start: 2020-07-28 | End: 2022-06-30

## 2022-06-30 RX ORDER — SIMVASTATIN 40 MG/1
40 TABLET, FILM COATED ORAL DAILY
Refills: 0 | Status: DISCONTINUED | COMMUNITY
Start: 2020-11-10 | End: 2022-06-30

## 2022-06-30 RX ORDER — ACETAMINOPHEN 325 MG/1
TABLET, FILM COATED ORAL
Refills: 0 | Status: DISCONTINUED | COMMUNITY
End: 2022-06-30

## 2022-06-30 RX ORDER — SODIUM BICARBONATE 650 MG/1
TABLET ORAL
Refills: 0 | Status: DISCONTINUED | COMMUNITY
End: 2022-06-30

## 2022-06-30 NOTE — ASSESSMENT
[Fair candidate] : a fair candidate. We should proceed with our protocol for evaluation for kidney transplantation. [FreeTextEntry1] : 1.  ESRD - Pt started dialysis May 2022.  He has a tunneled catheter and an AVF not yet working.  No living donors at present. \par 2.  CV - Pt denies cardiac disease.  Last stress and echo in 2020 - will need updated cardiac clearance.  \par 3.  DM2 - Pt is currently diet controlled. \par 4.  Toe amputation - no recent surgeries.  \par 5.  Cancer screening - Colonoscopy up to date.  Check PSA today.  \par \par Follow up annually.

## 2022-06-30 NOTE — HISTORY OF PRESENT ILLNESS
[FreeTextEntry1] : 66 years old male CKD (), following with Dr. Sukhi Patten presents for f/u while on the kidney transplant waitlist.  He wa s a preemptive candidate May 25th he was admitted to [facility] and started dialysis for progressive CKD.  He now has an AVF but still using tunneled catheter.  Since starting dialysis he has some edema which is improving. \par He has known DM (age 37) Not on insulin, diet controlled.  Had prior left foot 3rd toe amputation.   Mild neuropathy.  Used to walk several blocks but now about 1 block.  \par HTN (age 47). H/o Hyperlipidemia, controlled on statin / \par Had gastric lap band (), now deflated (2019)\par No Transfusions\par \par Has no h/o  UTI. H/o pneumonia once in \par Has arrhythmia on metoprolol.\par No known h/o active CAD/CVA/PVD/DVT\par Pt had bone marrow on Dec 1st, 2020 for evaluation to r/o myeloma - per patient it was negative.  PT took prednisone for URI which increased fluid retention.  Also recently diagnosed with asthma.  \par \par Most recent hospitalization/for dialysis initiationi. \par Past surgeries:\par Lap Band ()\par Left 3rd toe tip excision, AVF\par \par Last Seen 2020 \par Listed 2021\par ABO A\par PRA 0% 22\par \par Most recent testing\par Cardiac- seen by Dr Koroma 2020 cleared 2020\par EK20, normal sinus rhythm, RBBB \par Stress 20 : Exercise 4 METS, 96% MPHR, 04:20 min, HTN response 199/93 mmHg, SOB, no evidence of infarction or inducible ischemia, LVEF 56%\par ECHO 20: mild MAC, mild LAE, diastolic dysfunction, LVEF 73%\par CARDIAC CLEARANCE:\par At this time, patient is considered an acceptable risk from a cardiac standpoint for renal transplant. Follow up annually pre-transplant. \par \par Radiology \par CT abd and pelvis with contrast. 2020 Cholelithiasis other organs unremarkable. Vessels noted to have atherosclerotic changes. \par Chest Xray 2020 clear lungs. \par \par Cancer SCreening\par Colonoscopy 2/3/2021 polyp of descending colon path showing hyperplastic polyp. diverticulosis internal hemorrhoids. repeat 5 years. \par \par Thyroid excision (- benign)\par Kidney biopsy  (proteinuria, obesity)\par

## 2022-06-30 NOTE — PHYSICAL EXAM
[General Appearance - Alert] : alert [General Appearance - In No Acute Distress] : in no acute distress [Sclera] : the sclera and conjunctiva were normal [PERRL With Normal Accommodation] : pupils were equal in size, round, and reactive to light [Extraocular Movements] : extraocular movements were intact [Outer Ear] : the ears and nose were normal in appearance [Oropharynx] : the oropharynx was normal [Neck Appearance] : the appearance of the neck was normal [Neck Cervical Mass (___cm)] : no neck mass was observed [Jugular Venous Distention Increased] : there was no jugular-venous distention [Thyroid Diffuse Enlargement] : the thyroid was not enlarged [Thyroid Nodule] : there were no palpable thyroid nodules [Auscultation Breath Sounds / Voice Sounds] : lungs were clear to auscultation bilaterally [Full Pulse] : the pedal pulses are present [Bowel Sounds] : normal bowel sounds [Abdomen Soft] : soft [Abdomen Tenderness] : non-tender [Cervical Lymph Nodes Enlarged Posterior Bilaterally] : posterior cervical [Cervical Lymph Nodes Enlarged Anterior Bilaterally] : anterior cervical [Supraclavicular Lymph Nodes Enlarged Bilaterally] : supraclavicular [Axillary Lymph Nodes Enlarged Bilaterally] : axillary [Femoral Lymph Nodes Enlarged Bilaterally] : femoral [Inguinal Lymph Nodes Enlarged Bilaterally] : inguinal [Involuntary Movements] : no involuntary movements were seen [] : no rash [Oriented To Time, Place, And Person] : oriented to person, place, and time [Impaired Insight] : insight and judgment were intact [Affect] : the affect was normal [FreeTextEntry1] : no acute signs

## 2022-06-30 NOTE — H&P PST ADULT - COMFORT LEVEL, ACCEPTABLE
Meets 2/4 SIRS criteria with source of UTI  HR >90, 14% bands on presentation, 27% on 6/29     Lab Results   Component Value Date    WBC 3 27 (L) 06/30/2022    WBC 6 15 06/29/2022    WBC 4 15 (L) 06/28/2022     · Etiology: Likely 2/2 UTI  · Urine culture shows multidrug resistant E Coli       Plan:  · Switched cefepime to oral Macrobid for 4 more days  · Follow up final blood cultures    · Discontinued IV fluids  · Follow-up with PCP within 1 week of discharge 0

## 2022-06-30 NOTE — REASON FOR VISIT
[Initial Evaluation] : an initial evaluation [Follow-Up] : a follow-up visit for [FreeTextEntry1] : Pre kidney transplant evaluation

## 2022-07-07 ENCOUNTER — RESULT REVIEW (OUTPATIENT)
Age: 67
End: 2022-07-07

## 2022-07-07 LAB
ABO + RH PNL BLD: NORMAL
ALBUMIN SERPL ELPH-MCNC: 4.3 G/DL
ALP BLD-CCNC: 247 U/L
ALT SERPL-CCNC: 13 U/L
ANION GAP SERPL CALC-SCNC: 16 MMOL/L
AST SERPL-CCNC: 24 U/L
BASOPHILS # BLD AUTO: 0.05 K/UL
BASOPHILS NFR BLD AUTO: 0.5 %
BILIRUB SERPL-MCNC: 0.4 MG/DL
BUN SERPL-MCNC: 29 MG/DL
C PEPTIDE SERPL-MCNC: 5.6 NG/ML
CALCIUM SERPL-MCNC: 9 MG/DL
CHLORIDE SERPL-SCNC: 97 MMOL/L
CHOLEST SERPL-MCNC: 114 MG/DL
CK SERPL-CCNC: 101 U/L
CMV IGG SERPL QL: <0.2 U/ML
CMV IGG SERPL-IMP: NEGATIVE
CO2 SERPL-SCNC: 25 MMOL/L
COVID-19 SPIKE DOMAIN ANTIBODY INTERPRETATION: POSITIVE
CREAT SERPL-MCNC: 4.51 MG/DL
CRP SERPL-MCNC: 16 MG/L
EBV EA AB SER IA-ACNC: 7 U/ML
EBV EA AB TITR SER IF: POSITIVE
EBV EA IGG SER QL IA: 346 U/ML
EBV EA IGG SER-ACNC: NEGATIVE
EBV EA IGM SER IA-ACNC: NEGATIVE
EBV PATRN SPEC IB-IMP: NORMAL
EBV VCA IGG SER IA-ACNC: 164 U/ML
EBV VCA IGM SER QL IA: 30.5 U/ML
EGFR: 14 ML/MIN/1.73M2
EOSINOPHIL # BLD AUTO: 0.29 K/UL
EOSINOPHIL NFR BLD AUTO: 3 %
EPSTEIN-BARR VIRUS CAPSID ANTIGEN IGG: POSITIVE
ERYTHROCYTE [SEDIMENTATION RATE] IN BLOOD BY WESTERGREN METHOD: 68 MM/HR
ESTIMATED AVERAGE GLUCOSE: 103 MG/DL
GLUCOSE SERPL-MCNC: 126 MG/DL
HAV IGM SER QL: NONREACTIVE
HBA1C MFR BLD HPLC: 5.2 %
HBV CORE IGG+IGM SER QL: NONREACTIVE
HBV SURFACE AB SER QL: NONREACTIVE
HBV SURFACE AB SERPL IA-ACNC: <3 MIU/ML
HBV SURFACE AG SER QL: NONREACTIVE
HCT VFR BLD CALC: 27.1 %
HCV AB SER QL: NONREACTIVE
HCV S/CO RATIO: 0.1 S/CO
HDLC SERPL-MCNC: 42 MG/DL
HEPATITIS A IGG ANTIBODY: NONREACTIVE
HGB BLD-MCNC: 8.4 G/DL
HIV1+2 AB SPEC QL IA.RAPID: NONREACTIVE
HSV 1+2 IGG SER IA-IMP: NEGATIVE
HSV1 IGG SER QL: <0.01 INDEX
HSV1 IGG SER QL: <0.01 INDEX
HSV2 IGG SER QL: 0.07 INDEX
IMM GRANULOCYTES NFR BLD AUTO: 0.4 %
LDLC SERPL CALC-MCNC: 49 MG/DL
LYMPHOCYTES # BLD AUTO: 1.32 K/UL
LYMPHOCYTES NFR BLD AUTO: 13.6 %
MAGNESIUM SERPL-MCNC: 2.2 MG/DL
MAN DIFF?: NORMAL
MCHC RBC-ENTMCNC: 31 GM/DL
MCHC RBC-ENTMCNC: 31.2 PG
MCV RBC AUTO: 100.7 FL
MONOCYTES # BLD AUTO: 0.64 K/UL
MONOCYTES NFR BLD AUTO: 6.6 %
NEUTROPHILS # BLD AUTO: 7.37 K/UL
NEUTROPHILS NFR BLD AUTO: 75.9 %
NONHDLC SERPL-MCNC: 73 MG/DL
PHOSPHATE SERPL-MCNC: 3.8 MG/DL
PLATELET # BLD AUTO: 173 K/UL
POTASSIUM SERPL-SCNC: 3.8 MMOL/L
PROT SERPL-MCNC: 7.3 G/DL
PSA SERPL-MCNC: 0.82 NG/ML
RBC # BLD: 2.69 M/UL
RBC # FLD: 17.2 %
RUBV IGG FLD-ACNC: 31.9 INDEX
RUBV IGG SER-IMP: POSITIVE
SARS-COV-2 AB SERPL IA-ACNC: >250 U/ML
SARS-COV-2 N GENE NPH QL NAA+PROBE: NOT DETECTED
SODIUM SERPL-SCNC: 139 MMOL/L
T GONDII AB SER-IMP: POSITIVE
T GONDII IGG SER QL: 167 IU/ML
T PALLIDUM AB SER QL IA: NEGATIVE
T3 SERPL-MCNC: 236 NG/DL
T4 FREE SERPL-MCNC: 1.8 NG/DL
TRIGL SERPL-MCNC: 117 MG/DL
TSH SERPL-ACNC: 0.78 UIU/ML
URATE SERPL-MCNC: 3.8 MG/DL
VZV AB TITR SER: POSITIVE
VZV IGG SER IF-ACNC: 1894 INDEX
WBC # FLD AUTO: 9.71 K/UL

## 2022-07-08 LAB
HSV1 IGM SER QL: NEGATIVE
HSV2 AB FLD-ACNC: NEGATIVE

## 2022-11-01 ENCOUNTER — TRANSCRIPTION ENCOUNTER (OUTPATIENT)
Age: 67
End: 2022-11-01

## 2022-11-17 ENCOUNTER — NON-APPOINTMENT (OUTPATIENT)
Age: 67
End: 2022-11-17

## 2022-11-17 ENCOUNTER — APPOINTMENT (OUTPATIENT)
Dept: CARDIOLOGY | Facility: CLINIC | Age: 67
End: 2022-11-17

## 2022-11-17 VITALS
HEART RATE: 89 BPM | HEIGHT: 72 IN | TEMPERATURE: 97.6 F | OXYGEN SATURATION: 98 % | DIASTOLIC BLOOD PRESSURE: 94 MMHG | SYSTOLIC BLOOD PRESSURE: 173 MMHG | WEIGHT: 215 LBS | BODY MASS INDEX: 29.12 KG/M2

## 2022-11-17 PROCEDURE — 99213 OFFICE O/P EST LOW 20 MIN: CPT

## 2022-11-17 PROCEDURE — 99072 ADDL SUPL MATRL&STAF TM PHE: CPT

## 2022-11-17 PROCEDURE — 93000 ELECTROCARDIOGRAM COMPLETE: CPT

## 2022-11-17 RX ORDER — ALBUTEROL SULFATE 90 UG/1
108 (90 BASE) INHALANT RESPIRATORY (INHALATION)
Qty: 8 | Refills: 0 | Status: ACTIVE | COMMUNITY
Start: 2022-07-05

## 2022-11-17 RX ORDER — FLUTICASONE PROPIONATE 110 UG/1
110 AEROSOL, METERED RESPIRATORY (INHALATION)
Qty: 12 | Refills: 0 | Status: ACTIVE | COMMUNITY
Start: 2022-07-05

## 2022-11-19 NOTE — CARDIOLOGY SUMMARY
[de-identified] : \par 11/17/22 - sinus rhythm, first degree AV block, RBBB, LAFB\par  [de-identified] : \par 12/11/20 (exercise tetrofosmin) - 4 METS, 96% MPHR, 04:20 min, Duke score -5, no evidence of infarction or inducible ischemia, LVEF 56%\par  [de-identified] : \par 07/30/20 - mild MAC, normal LV and RV size and function, LVEF 73%

## 2022-11-19 NOTE — HISTORY OF PRESENT ILLNESS
[FreeTextEntry1] : Started dialysis back in May 2022. Denies chest pain, shortness of breath or palpitations.

## 2022-11-19 NOTE — PHYSICAL EXAM
[Well Developed] : well developed [Well Nourished] : well nourished [No Acute Distress] : no acute distress [Normal Conjunctiva] : normal conjunctiva [Normal Venous Pressure] : normal venous pressure [No Carotid Bruit] : no carotid bruit [Normal S1, S2] : normal S1, S2 [No Murmur] : no murmur [No Rub] : no rub [No Gallop] : no gallop [Clear Lung Fields] : clear lung fields [Good Air Entry] : good air entry [No Respiratory Distress] : no respiratory distress  [Soft] : abdomen soft [Non Tender] : non-tender [Normal Gait] : normal gait [No Cyanosis] : no cyanosis [No Rash] : no rash [No Skin Lesions] : no skin lesions [Moves all extremities] : moves all extremities [No Focal Deficits] : no focal deficits [Normal Speech] : normal speech [Alert and Oriented] : alert and oriented [de-identified] : bilateral 1+ LE pitting edema

## 2022-11-19 NOTE — DISCUSSION/SUMMARY
[Hypertension] : hypertension [Low Sodium Diet] : low sodium diet [EKG obtained to assist in diagnosis and management of assessed problem(s)] : EKG obtained to assist in diagnosis and management of assessed problem(s) [FreeTextEntry1] : \par Currently stable from a cardiovascular standpoint. Hypertensive today (BP usually 130's mmHg systolic). Appears to be in mild volume overload. No ischemic or CHF symptoms. Continue current medications. ECG completed today and reviewed (findings as noted above). Given patient’s CAD risk factors, will schedule a pharmacologic nuclear stress test to rule out any significant CAD (only able to achieve 4 METS last time). In addition, will schedule an echocardiogram to reassess his cardiac structures and function. Pending the test results, I will make further recommendations. At this time, patient is considered an acceptable risk from a cardiac standpoint for renal transplant. Follow up annually pre-transplant.

## 2022-11-22 ENCOUNTER — OUTPATIENT (OUTPATIENT)
Dept: OUTPATIENT SERVICES | Facility: HOSPITAL | Age: 67
LOS: 1 days | End: 2022-11-22
Payer: COMMERCIAL

## 2022-11-22 ENCOUNTER — APPOINTMENT (OUTPATIENT)
Dept: CT IMAGING | Facility: CLINIC | Age: 67
End: 2022-11-22
Payer: COMMERCIAL

## 2022-11-22 DIAGNOSIS — Z98.49 CATARACT EXTRACTION STATUS, UNSPECIFIED EYE: Chronic | ICD-10-CM

## 2022-11-22 DIAGNOSIS — Z98.84 BARIATRIC SURGERY STATUS: Chronic | ICD-10-CM

## 2022-11-22 DIAGNOSIS — Z98.890 OTHER SPECIFIED POSTPROCEDURAL STATES: Chronic | ICD-10-CM

## 2022-11-22 DIAGNOSIS — Z87.898 PERSONAL HISTORY OF OTHER SPECIFIED CONDITIONS: Chronic | ICD-10-CM

## 2022-11-22 DIAGNOSIS — Z01.818 ENCOUNTER FOR OTHER PREPROCEDURAL EXAMINATION: ICD-10-CM

## 2022-11-22 PROCEDURE — 74177 CT ABD & PELVIS W/CONTRAST: CPT

## 2022-11-22 PROCEDURE — 74177 CT ABD & PELVIS W/CONTRAST: CPT | Mod: 26

## 2022-12-08 ENCOUNTER — NON-APPOINTMENT (OUTPATIENT)
Age: 67
End: 2022-12-08

## 2022-12-13 ENCOUNTER — RESULT REVIEW (OUTPATIENT)
Age: 67
End: 2022-12-13

## 2022-12-13 ENCOUNTER — INPATIENT (INPATIENT)
Facility: HOSPITAL | Age: 67
LOS: 1 days | Discharge: HOME | End: 2022-12-15
Attending: INTERNAL MEDICINE | Admitting: INTERNAL MEDICINE
Payer: MEDICARE

## 2022-12-13 ENCOUNTER — APPOINTMENT (OUTPATIENT)
Dept: CV DIAGNOSITCS | Facility: HOSPITAL | Age: 67
End: 2022-12-13

## 2022-12-13 ENCOUNTER — APPOINTMENT (OUTPATIENT)
Dept: CV DIAGNOSTICS | Facility: HOSPITAL | Age: 67
End: 2022-12-13

## 2022-12-13 VITALS
RESPIRATION RATE: 18 BRPM | OXYGEN SATURATION: 100 % | TEMPERATURE: 98 F | DIASTOLIC BLOOD PRESSURE: 55 MMHG | HEART RATE: 95 BPM | SYSTOLIC BLOOD PRESSURE: 100 MMHG

## 2022-12-13 DIAGNOSIS — E11.9 TYPE 2 DIABETES MELLITUS WITHOUT COMPLICATIONS: ICD-10-CM

## 2022-12-13 DIAGNOSIS — Z87.898 PERSONAL HISTORY OF OTHER SPECIFIED CONDITIONS: Chronic | ICD-10-CM

## 2022-12-13 DIAGNOSIS — Z98.84 BARIATRIC SURGERY STATUS: Chronic | ICD-10-CM

## 2022-12-13 DIAGNOSIS — Z01.818 ENCOUNTER FOR OTHER PREPROCEDURAL EXAMINATION: ICD-10-CM

## 2022-12-13 DIAGNOSIS — Z98.890 OTHER SPECIFIED POSTPROCEDURAL STATES: Chronic | ICD-10-CM

## 2022-12-13 DIAGNOSIS — Z98.49 CATARACT EXTRACTION STATUS, UNSPECIFIED EYE: Chronic | ICD-10-CM

## 2022-12-13 LAB
ALBUMIN FLD-MCNC: 3 G/DL — SIGNIFICANT CHANGE UP
ANION GAP SERPL CALC-SCNC: 15 MMOL/L — HIGH (ref 7–14)
BUN SERPL-MCNC: 42 MG/DL — HIGH (ref 7–23)
CALCIUM SERPL-MCNC: 9.5 MG/DL — SIGNIFICANT CHANGE UP (ref 8.4–10.5)
CHLORIDE SERPL-SCNC: 92 MMOL/L — LOW (ref 98–107)
CO2 SERPL-SCNC: 26 MMOL/L — SIGNIFICANT CHANGE UP (ref 22–31)
CREAT SERPL-MCNC: 6.05 MG/DL — HIGH (ref 0.5–1.3)
EGFR: 10 ML/MIN/1.73M2 — LOW
GLUCOSE BLDC GLUCOMTR-MCNC: 132 MG/DL — HIGH (ref 70–99)
GLUCOSE BLDC GLUCOMTR-MCNC: 132 MG/DL — HIGH (ref 70–99)
GLUCOSE FLD-MCNC: 126 MG/DL — SIGNIFICANT CHANGE UP
GLUCOSE SERPL-MCNC: 142 MG/DL — HIGH (ref 70–99)
GRAM STN FLD: SIGNIFICANT CHANGE UP
HCT VFR BLD CALC: 34.6 % — LOW (ref 39–50)
HGB BLD-MCNC: 10.6 G/DL — LOW (ref 13–17)
LDH SERPL L TO P-CCNC: 719 U/L — SIGNIFICANT CHANGE UP
MCHC RBC-ENTMCNC: 30.6 GM/DL — LOW (ref 32–36)
MCHC RBC-ENTMCNC: 31.4 PG — SIGNIFICANT CHANGE UP (ref 27–34)
MCV RBC AUTO: 102.4 FL — HIGH (ref 80–100)
NRBC # BLD: 0 /100 WBCS — SIGNIFICANT CHANGE UP (ref 0–0)
NRBC # FLD: 0 K/UL — SIGNIFICANT CHANGE UP (ref 0–0)
PLATELET # BLD AUTO: 144 K/UL — LOW (ref 150–400)
POTASSIUM SERPL-MCNC: 4 MMOL/L — SIGNIFICANT CHANGE UP (ref 3.5–5.3)
POTASSIUM SERPL-SCNC: 4 MMOL/L — SIGNIFICANT CHANGE UP (ref 3.5–5.3)
PROT FLD-MCNC: 5.7 G/DL — SIGNIFICANT CHANGE UP
RBC # BLD: 3.38 M/UL — LOW (ref 4.2–5.8)
RBC # FLD: 16.4 % — HIGH (ref 10.3–14.5)
SARS-COV-2 RNA SPEC QL NAA+PROBE: SIGNIFICANT CHANGE UP
SODIUM SERPL-SCNC: 133 MMOL/L — LOW (ref 135–145)
SPECIMEN SOURCE: SIGNIFICANT CHANGE UP
WBC # BLD: 6.55 K/UL — SIGNIFICANT CHANGE UP (ref 3.8–10.5)
WBC # FLD AUTO: 6.55 K/UL — SIGNIFICANT CHANGE UP (ref 3.8–10.5)

## 2022-12-13 PROCEDURE — 88305 TISSUE EXAM BY PATHOLOGIST: CPT | Mod: 26

## 2022-12-13 PROCEDURE — 93308 TTE F-UP OR LMTD: CPT | Mod: 26

## 2022-12-13 PROCEDURE — 99152 MOD SED SAME PHYS/QHP 5/>YRS: CPT

## 2022-12-13 PROCEDURE — 33016 PERICARDIOCENTESIS W/IMAGING: CPT

## 2022-12-13 PROCEDURE — 93306 TTE W/DOPPLER COMPLETE: CPT | Mod: 26

## 2022-12-13 PROCEDURE — 0715T: CPT

## 2022-12-13 PROCEDURE — 88112 CYTOPATH CELL ENHANCE TECH: CPT | Mod: 26

## 2022-12-13 RX ORDER — SIMVASTATIN 20 MG/1
1 TABLET, FILM COATED ORAL
Qty: 0 | Refills: 0 | DISCHARGE

## 2022-12-13 RX ORDER — FOLIC ACID 0.8 MG
1 TABLET ORAL DAILY
Refills: 0 | Status: DISCONTINUED | OUTPATIENT
Start: 2022-12-13 | End: 2022-12-15

## 2022-12-13 RX ORDER — SODIUM CHLORIDE 9 MG/ML
3 INJECTION INTRAMUSCULAR; INTRAVENOUS; SUBCUTANEOUS EVERY 8 HOURS
Refills: 0 | Status: DISCONTINUED | OUTPATIENT
Start: 2022-12-13 | End: 2022-12-15

## 2022-12-13 RX ORDER — METOPROLOL TARTRATE 50 MG
50 TABLET ORAL DAILY
Refills: 0 | Status: DISCONTINUED | OUTPATIENT
Start: 2022-12-13 | End: 2022-12-15

## 2022-12-13 RX ORDER — ATORVASTATIN CALCIUM 80 MG/1
40 TABLET, FILM COATED ORAL AT BEDTIME
Refills: 0 | Status: DISCONTINUED | OUTPATIENT
Start: 2022-12-13 | End: 2022-12-15

## 2022-12-13 RX ORDER — HYDROCHLOROTHIAZIDE 25 MG
1 TABLET ORAL
Qty: 0 | Refills: 0 | DISCHARGE

## 2022-12-13 RX ORDER — ALBUTEROL 90 UG/1
2 AEROSOL, METERED ORAL EVERY 6 HOURS
Refills: 0 | Status: DISCONTINUED | OUTPATIENT
Start: 2022-12-13 | End: 2022-12-13

## 2022-12-13 RX ORDER — LOSARTAN POTASSIUM 100 MG/1
1 TABLET, FILM COATED ORAL
Qty: 0 | Refills: 0 | DISCHARGE

## 2022-12-13 RX ORDER — LEVOTHYROXINE SODIUM 125 MCG
125 TABLET ORAL DAILY
Refills: 0 | Status: DISCONTINUED | OUTPATIENT
Start: 2022-12-13 | End: 2022-12-15

## 2022-12-13 RX ORDER — AMLODIPINE BESYLATE 2.5 MG/1
10 TABLET ORAL
Refills: 0 | Status: DISCONTINUED | OUTPATIENT
Start: 2022-12-13 | End: 2022-12-15

## 2022-12-13 RX ORDER — HYDRALAZINE HCL 50 MG
50 TABLET ORAL
Refills: 0 | Status: DISCONTINUED | OUTPATIENT
Start: 2022-12-13 | End: 2022-12-15

## 2022-12-13 RX ORDER — CEPHALEXIN 500 MG
1 CAPSULE ORAL
Qty: 0 | Refills: 0 | DISCHARGE

## 2022-12-13 RX ORDER — HYDRALAZINE HCL 50 MG
50 TABLET ORAL
Refills: 0 | Status: DISCONTINUED | OUTPATIENT
Start: 2022-12-13 | End: 2022-12-13

## 2022-12-13 RX ORDER — CALCIUM ACETATE 667 MG
667 TABLET ORAL
Refills: 0 | Status: DISCONTINUED | OUTPATIENT
Start: 2022-12-13 | End: 2022-12-15

## 2022-12-13 RX ORDER — BUDESONIDE AND FORMOTEROL FUMARATE DIHYDRATE 160; 4.5 UG/1; UG/1
2 AEROSOL RESPIRATORY (INHALATION)
Refills: 0 | Status: DISCONTINUED | OUTPATIENT
Start: 2022-12-13 | End: 2022-12-14

## 2022-12-13 RX ORDER — ACETAMINOPHEN 500 MG
650 TABLET ORAL EVERY 6 HOURS
Refills: 0 | Status: DISCONTINUED | OUTPATIENT
Start: 2022-12-13 | End: 2022-12-15

## 2022-12-13 RX ORDER — TIOTROPIUM BROMIDE 18 UG/1
2 CAPSULE ORAL; RESPIRATORY (INHALATION) DAILY
Refills: 0 | Status: DISCONTINUED | OUTPATIENT
Start: 2022-12-13 | End: 2022-12-14

## 2022-12-13 RX ORDER — AMLODIPINE BESYLATE 2.5 MG/1
10 TABLET ORAL DAILY
Refills: 0 | Status: DISCONTINUED | OUTPATIENT
Start: 2022-12-13 | End: 2022-12-13

## 2022-12-13 RX ADMIN — Medication 650 MILLIGRAM(S): at 23:47

## 2022-12-13 RX ADMIN — Medication 1 MILLIGRAM(S): at 23:47

## 2022-12-13 RX ADMIN — Medication 50 MILLIGRAM(S): at 20:41

## 2022-12-13 RX ADMIN — ATORVASTATIN CALCIUM 40 MILLIGRAM(S): 80 TABLET, FILM COATED ORAL at 23:47

## 2022-12-13 RX ADMIN — Medication 125 MICROGRAM(S): at 20:42

## 2022-12-13 NOTE — H&P CARDIOLOGY - NSICDXPASTSURGICALHX_GEN_ALL_CORE_FT
PAST SURGICAL HISTORY:  H/O laparoscopic adjustable gastric banding ' 2010    History of foreign body aspiration 7/2018  from Left Ear    S/P cataract extraction '08 and ' 2011   Bilateral    S/P partial thyroidectomy '95  benign

## 2022-12-13 NOTE — CONSULT NOTE ADULT - ASSESSMENT
67M with history of ESRD on HD, HTN, T2DM, Hypothyroidism 2/2 thyroidectomy, asthma presented for echo as part of kidney transplant evaluation s/p pericardiocentesis due to significant amount of pericardial effusion -1.52L. Patient without any symptoms of hypotension, dizziness, chest pain, or palpitations.       #Pericardial Effusion  #Dialysis   -Will follow up pericardial fluid status to determine etiology (may be secondary to dialysis associated effusions  -Monitor for signs of hypotension, Chest discomfort/fullness, Progressive dyspnea, Orthopnea  -Please obtain recent echo in march from patient's cardiologist with [facility]  -Monitor daily output of drain, when <25-50 cc/day of fluid will reassess for removal

## 2022-12-13 NOTE — H&P CARDIOLOGY - NSICDXFAMILYHX_GEN_ALL_CORE_FT
FAMILY HISTORY:  Mother  Still living? Unknown  Family history of atherosclerosis, Age at diagnosis: Age Unknown

## 2022-12-13 NOTE — H&P CARDIOLOGY - GASTROINTESTINAL DETAILS
Vestibular Rehabilitation Therapy    TARGETS    Purpose of Activity: This activity will help you keep your vision stable with large head movements. This type of movement is often needed when changing lanes while driving.     1. While seated in a comfortable chair, find three objects in the room that are at eye level. One of the objects should be to your far left, one should be in front of you, and one should be on your far right.                        2. Move your head to the left target, then the center target, then the right target, then center, then left. This completes one cycle.   3. Repetitions: Repeat 30 to 40 cycles without stopping at each target.  4. Then repeat 30 to 40 cycles, but stop for one second at each target.  Do once per day, preferably at night before bedtime.             HORIZONTAL HEAD MOVEMENTS    Purpose of Activity: This activity will help you keep your vision stable with head movements. This is similar to watching for a break in traffic.   1. Sit in a comfortable chair with your feet flat on the floor and your hands on your thighs.  2. Keeping your trunk still, quickly turn your head and look to the right, then turn your head and look to the left without stopping in the center, and then look to the center and focus on an object for five seconds. This completes one cycle.   3. For best results, briefly focus your eyes on an object or target to both right and left directions.   4. Repetitions: Repeat 30 to 40 times.  Do once per day, preferably at night before bedtime.   HEAD CIRCLES    Purpose of Activity: This activity will help you keep your vision stable with smaller head movements. This would be similar to standing in an aisle of a store and looking for an item on the shelves.   1. Sit in a comfortable chair and move your head in a circular motion with your eyes open. Let your eyes lead the way. Each full [place] completes one cycle.  2. Repeat step one with your eyes closed.  3.  Repetitions: Repeat 30 to 40 times in the clockwise direction.  4. Repeat 30 to 40 times in the counter clockwise direction.  Do once per day, preferably at night before bedtime.   FOCUSING WITH HEAD TURNS    Purpose of Activity: This activity will help you stabilize your gaze with quick, short head movements. This type of movement is used while driving.   1. Sit in a comfortable chair and bring your index finger or a playing card approximately 10 inches in front of your nose.  2. Focus on your finger or the card while turning your head from side to side. Try not to let the object blur.  3. Gradually increase the speed of the head turns.  4. Repetitions: Repeat 30 to 40 times.   Do once per day, preferably at night before bedtime.   GAIT WITH HEAD MOVEMENT    Purpose of Activity: This activity will help you build stable head movements while walking. This type of movement occurs when walking down the aisle of a grocery store searching for an item.   1. Begin walking at your normal speed. Walk near a wall so that you can reach out to steady yourself if necessary. A hallway is an excellent place for this activity in the beginning.  2. After three steps, turn your head and look to the right while continuing to walk straight ahead.  3. After three more steps, turn your head and look to the left while continuing to walk straight ahead.  4. To increase the difficulty of this task, go from a solid floor to a carpeted floor, or walk outdoors on an uneven surface. Thick lawns usually are the most difficult surface.  5. Repetitions: Repeat 30 to 40 times.   Do once per day, preferably at night before bedtime.        soft/nontender/no distention

## 2022-12-13 NOTE — H&P CARDIOLOGY - HISTORY OF PRESENT ILLNESS
This is a 68 yo male with a PMH of HTN, ESRD on HD(M,W,F) via left AVF, hypothyroidism, Asthma presented today for ECHO as a part of  pre transplant evaluation. Patient was found to have large  pericardial effusion and referred  for pericardiocentesis.    Patient denies chest pain, SOB, palpitations, dizziness, presyncope, syncope,  headache, visual disturbances, CVA, PE, DVT, KRYSTIAN, abdominal pain, N/V/D/C, hematochezia, melena, dysuria, hematuria, fever, chills.

## 2022-12-13 NOTE — H&P CARDIOLOGY - NSICDXPASTMEDICALHX_GEN_ALL_CORE_FT
PAST MEDICAL HISTORY:  ESRD on dialysis     Hard of hearing Bilateral hearing Aids    HTN (hypertension)     Hypothyroidism     Osteomyelitis     Type 2 diabetes mellitus

## 2022-12-13 NOTE — CONSULT NOTE ADULT - SUBJECTIVE AND OBJECTIVE BOX
Breanne Chinchilla MD  Cardiology Fellow  [phone]  All Cardiology service information can be found 24/7 on [email or web address], password: [email or web address]    Patient seen and evaluated at bedside    Chief Complaint:    HPI:  This is a 66 yo male with a PMH of HTN, ESRD on HD(M,W,F) via left AVF, hypothyroidism, Asthma presented today for ECHO as a part of  pre transplant evaluation. Patient was found to have large  pericardial effusion and referred  for pericardiocentesis.    Patient denies chest pain, SOB, palpitations, dizziness, presyncope, syncope,  headache, visual disturbances, CVA, PE, DVT, KRYSTIAN, abdominal pain, N/V/D/C, hematochezia, melena, dysuria, hematuria, fever, chills.  (13 Dec 2022 14:36)      PMHx:   Hard of hearing    Type 2 diabetes mellitus    HTN (hypertension)    Hypothyroidism    Osteomyelitis    ESRD on dialysis        PSHx:   No significant past surgical history    S/P partial thyroidectomy    H/O laparoscopic adjustable gastric banding    S/P cataract extraction    History of foreign body aspiration        Allergies:  aspirin (Unknown)  capsicum (Unknown)  Enalapril Maleate (Unknown)  Enalaprilat (Unknown)  MetFORMIN Hydrochloride (Unknown)  Vasotec (Unknown)      Home Meds:    Current Medications:   albuterol    90 MICROgram(s) HFA Inhaler 2 Puff(s) Inhalation every 6 hours PRN  amLODIPine   Tablet 10 milliGRAM(s) Oral <User Schedule>  atorvastatin 40 milliGRAM(s) Oral at bedtime  calcium acetate 667 milliGRAM(s) Oral three times a day with meals  folic acid 1 milliGRAM(s) Oral daily  hydrALAZINE 50 milliGRAM(s) Oral <User Schedule>  levothyroxine 125 MICROGram(s) Oral daily  metoprolol succinate ER 50 milliGRAM(s) Oral daily  sodium chloride 0.9% lock flush 3 milliLiter(s) IV Push every 8 hours      FAMILY HISTORY:  Family history of atherosclerosis (Mother)        REVIEW OF SYSTEMS:    All other review of systems is negative unless indicated above.    Physical Exam:  T(F): --  HR: --  BP: --  RR: --  SpO2: --  GENERAL: No acute distress, well-developed  HEAD:  Atraumatic, Normocephalic  ENT: brisk carotid pulse   CHEST/LUNG: Clear to auscultation bilaterally; No wheeze, equal breath sounds bilaterally   BACK: No spinal tenderness  HEART: Regular rate and rhythm; +flow murmur, brisk pulses. Pericardiocentesis site c/d/i with serosanguinous fluid seen in drain  ABDOMEN: Soft, Nontender, Nondistended; Bowel sounds present  EXTREMITIES:  No clubbing, cyanosis, or edema. Fistula noted in LUE   PSYCH: Nl behavior, nl affect  NEUROLOGY: AAOx3, non-focal, cranial nerves intact  SKIN: Normal color, No rashes or lesions  LINES:    Cardiovascular Diagnostic Testing:    Echo: Personally reviewed:    Stress Testing:    Cath:    Imaging:    CXR: Personally reviewed    Labs: Personally reviewed                        10.6   6.55  )-----------( 144      ( 13 Dec 2022 14:45 )             34.6     12-13    133<L>  |  92<L>  |  42<H>  ----------------------------<  142<H>  4.0   |  26  |  6.05<H>    Ca    9.5      13 Dec 2022 14:45

## 2022-12-13 NOTE — H&P CARDIOLOGY - REVIEW OF SYSTEMS
Patient denies chest pain, SOB, palpitations, dizziness, presyncope, syncope,  headache, visual disturbances, CVA, PE, DVT, KRYSTIAN, abdominal pain, N/V/D/C, hematochezia, melena, dysuria, hematuria, fever, chills.

## 2022-12-14 DIAGNOSIS — N18.6 END STAGE RENAL DISEASE: ICD-10-CM

## 2022-12-14 LAB
A1C WITH ESTIMATED AVERAGE GLUCOSE RESULT: 4.8 % — SIGNIFICANT CHANGE UP (ref 4–5.6)
ALBUMIN SERPL ELPH-MCNC: 3.3 G/DL — SIGNIFICANT CHANGE UP (ref 3.3–5)
ALP SERPL-CCNC: 281 U/L — HIGH (ref 40–120)
ALT FLD-CCNC: 19 U/L — SIGNIFICANT CHANGE UP (ref 4–41)
ANION GAP SERPL CALC-SCNC: 15 MMOL/L — HIGH (ref 7–14)
AST SERPL-CCNC: 24 U/L — SIGNIFICANT CHANGE UP (ref 4–40)
BASOPHILS # BLD AUTO: 0.02 K/UL — SIGNIFICANT CHANGE UP (ref 0–0.2)
BASOPHILS NFR BLD AUTO: 0.2 % — SIGNIFICANT CHANGE UP (ref 0–2)
BILIRUB SERPL-MCNC: 0.4 MG/DL — SIGNIFICANT CHANGE UP (ref 0.2–1.2)
BUN SERPL-MCNC: 46 MG/DL — HIGH (ref 7–23)
CALCIUM SERPL-MCNC: 8.6 MG/DL — SIGNIFICANT CHANGE UP (ref 8.4–10.5)
CHLORIDE SERPL-SCNC: 96 MMOL/L — LOW (ref 98–107)
CHOLEST SERPL-MCNC: 94 MG/DL — SIGNIFICANT CHANGE UP
CO2 SERPL-SCNC: 24 MMOL/L — SIGNIFICANT CHANGE UP (ref 22–31)
CREAT SERPL-MCNC: 6.6 MG/DL — HIGH (ref 0.5–1.3)
DIALYSIS INSTRUMENT RESULT - HEPATITIS B SURFACE ANTIGEN: NEGATIVE — SIGNIFICANT CHANGE UP
EGFR: 9 ML/MIN/1.73M2 — LOW
EOSINOPHIL # BLD AUTO: 0.06 K/UL — SIGNIFICANT CHANGE UP (ref 0–0.5)
EOSINOPHIL NFR BLD AUTO: 0.7 % — SIGNIFICANT CHANGE UP (ref 0–6)
ESTIMATED AVERAGE GLUCOSE: 91 — SIGNIFICANT CHANGE UP
GLUCOSE SERPL-MCNC: 115 MG/DL — HIGH (ref 70–99)
HCT VFR BLD CALC: 32.7 % — LOW (ref 39–50)
HDLC SERPL-MCNC: 35 MG/DL — LOW
HGB BLD-MCNC: 10.2 G/DL — LOW (ref 13–17)
IANC: 6.72 K/UL — SIGNIFICANT CHANGE UP (ref 1.8–7.4)
IMM GRANULOCYTES NFR BLD AUTO: 0.4 % — SIGNIFICANT CHANGE UP (ref 0–0.9)
LIPID PNL WITH DIRECT LDL SERPL: 40 MG/DL — SIGNIFICANT CHANGE UP
LYMPHOCYTES # BLD AUTO: 0.69 K/UL — LOW (ref 1–3.3)
LYMPHOCYTES # BLD AUTO: 8.6 % — LOW (ref 13–44)
MAGNESIUM SERPL-MCNC: 2.2 MG/DL — SIGNIFICANT CHANGE UP (ref 1.6–2.6)
MCHC RBC-ENTMCNC: 31.2 GM/DL — LOW (ref 32–36)
MCHC RBC-ENTMCNC: 31.6 PG — SIGNIFICANT CHANGE UP (ref 27–34)
MCV RBC AUTO: 101.2 FL — HIGH (ref 80–100)
MONOCYTES # BLD AUTO: 0.52 K/UL — SIGNIFICANT CHANGE UP (ref 0–0.9)
MONOCYTES NFR BLD AUTO: 6.5 % — SIGNIFICANT CHANGE UP (ref 2–14)
NEUTROPHILS # BLD AUTO: 6.72 K/UL — SIGNIFICANT CHANGE UP (ref 1.8–7.4)
NEUTROPHILS NFR BLD AUTO: 83.6 % — HIGH (ref 43–77)
NIGHT BLUE STAIN TISS: SIGNIFICANT CHANGE UP
NON HDL CHOLESTEROL: 59 MG/DL — SIGNIFICANT CHANGE UP
NRBC # BLD: 0 /100 WBCS — SIGNIFICANT CHANGE UP (ref 0–0)
NRBC # FLD: 0 K/UL — SIGNIFICANT CHANGE UP (ref 0–0)
PHOSPHATE SERPL-MCNC: 3.2 MG/DL — SIGNIFICANT CHANGE UP (ref 2.5–4.5)
PLATELET # BLD AUTO: 149 K/UL — LOW (ref 150–400)
POTASSIUM SERPL-MCNC: 4.1 MMOL/L — SIGNIFICANT CHANGE UP (ref 3.5–5.3)
POTASSIUM SERPL-SCNC: 4.1 MMOL/L — SIGNIFICANT CHANGE UP (ref 3.5–5.3)
PROT SERPL-MCNC: 6.2 G/DL — SIGNIFICANT CHANGE UP (ref 6–8.3)
RBC # BLD: 3.23 M/UL — LOW (ref 4.2–5.8)
RBC # FLD: 16 % — HIGH (ref 10.3–14.5)
SODIUM SERPL-SCNC: 135 MMOL/L — SIGNIFICANT CHANGE UP (ref 135–145)
SPECIMEN SOURCE: SIGNIFICANT CHANGE UP
TRIGL SERPL-MCNC: 94 MG/DL — SIGNIFICANT CHANGE UP
TSH SERPL-MCNC: 0.25 UIU/ML — LOW (ref 0.27–4.2)
WBC # BLD: 8.04 K/UL — SIGNIFICANT CHANGE UP (ref 3.8–10.5)
WBC # FLD AUTO: 8.04 K/UL — SIGNIFICANT CHANGE UP (ref 3.8–10.5)

## 2022-12-14 PROCEDURE — 99222 1ST HOSP IP/OBS MODERATE 55: CPT | Mod: GC

## 2022-12-14 PROCEDURE — 99223 1ST HOSP IP/OBS HIGH 75: CPT

## 2022-12-14 RX ORDER — CHLORHEXIDINE GLUCONATE 213 G/1000ML
1 SOLUTION TOPICAL DAILY
Refills: 0 | Status: DISCONTINUED | OUTPATIENT
Start: 2022-12-14 | End: 2022-12-15

## 2022-12-14 RX ORDER — LIDOCAINE AND PRILOCAINE CREAM 25; 25 MG/G; MG/G
1 CREAM TOPICAL DAILY
Refills: 0 | Status: DISCONTINUED | OUTPATIENT
Start: 2022-12-14 | End: 2022-12-15

## 2022-12-14 RX ORDER — BUDESONIDE AND FORMOTEROL FUMARATE DIHYDRATE 160; 4.5 UG/1; UG/1
2 AEROSOL RESPIRATORY (INHALATION) DAILY
Refills: 0 | Status: DISCONTINUED | OUTPATIENT
Start: 2022-12-14 | End: 2022-12-15

## 2022-12-14 RX ORDER — TIOTROPIUM BROMIDE 18 UG/1
2 CAPSULE ORAL; RESPIRATORY (INHALATION) DAILY
Refills: 0 | Status: DISCONTINUED | OUTPATIENT
Start: 2022-12-14 | End: 2022-12-15

## 2022-12-14 RX ADMIN — CHLORHEXIDINE GLUCONATE 1 APPLICATION(S): 213 SOLUTION TOPICAL at 16:50

## 2022-12-14 RX ADMIN — Medication 125 MICROGRAM(S): at 22:23

## 2022-12-14 RX ADMIN — SODIUM CHLORIDE 3 MILLILITER(S): 9 INJECTION INTRAMUSCULAR; INTRAVENOUS; SUBCUTANEOUS at 22:29

## 2022-12-14 RX ADMIN — Medication 667 MILLIGRAM(S): at 19:07

## 2022-12-14 RX ADMIN — ATORVASTATIN CALCIUM 40 MILLIGRAM(S): 80 TABLET, FILM COATED ORAL at 22:23

## 2022-12-14 RX ADMIN — SODIUM CHLORIDE 3 MILLILITER(S): 9 INJECTION INTRAMUSCULAR; INTRAVENOUS; SUBCUTANEOUS at 16:00

## 2022-12-14 RX ADMIN — BUDESONIDE AND FORMOTEROL FUMARATE DIHYDRATE 2 PUFF(S): 160; 4.5 AEROSOL RESPIRATORY (INHALATION) at 22:31

## 2022-12-14 RX ADMIN — Medication 650 MILLIGRAM(S): at 22:23

## 2022-12-14 RX ADMIN — SODIUM CHLORIDE 3 MILLILITER(S): 9 INJECTION INTRAMUSCULAR; INTRAVENOUS; SUBCUTANEOUS at 00:22

## 2022-12-14 RX ADMIN — LIDOCAINE AND PRILOCAINE CREAM 1 APPLICATION(S): 25; 25 CREAM TOPICAL at 11:28

## 2022-12-14 RX ADMIN — SODIUM CHLORIDE 3 MILLILITER(S): 9 INJECTION INTRAMUSCULAR; INTRAVENOUS; SUBCUTANEOUS at 06:02

## 2022-12-14 RX ADMIN — Medication 1 MILLIGRAM(S): at 16:50

## 2022-12-14 RX ADMIN — Medication 650 MILLIGRAM(S): at 23:23

## 2022-12-14 RX ADMIN — Medication 650 MILLIGRAM(S): at 00:25

## 2022-12-14 RX ADMIN — Medication 50 MILLIGRAM(S): at 22:25

## 2022-12-14 RX ADMIN — Medication 667 MILLIGRAM(S): at 10:24

## 2022-12-14 NOTE — PROGRESS NOTE ADULT - ASSESSMENT
67M with history of ESRD on HD, HTN, T2DM, Hypothyroidism 2/2 thyroidectomy, asthma presented for echo as part of kidney transplant evaluation s/p pericardiocentesis due to significant amount of pericardial effusion -1.52L.    Pericardial Effusion: s/p drain placement 12/13. 1.52L drained initially, with 300 cc drained overnight, and 70 cc from 9 am to 11 am 12/14.  - will follow up in the afternoon, and if < 100 cc drained from 9 AM, will pull drain, otherwise will keep until < 100 cc / 24 hours    Sharyn Lujan MD  Cardiology Fellow, PGY-6  [phone]  For all other Cardiology service contact information, go to [email or web address] and use "[email or web address]" to login. 67M with history of ESRD on HD, HTN, T2DM, Hypothyroidism 2/2 thyroidectomy, asthma presented for echo as part of kidney transplant evaluation s/p pericardiocentesis due to significant amount of pericardial effusion -1.52L.    Pericardial Effusion: s/p drain placement 12/13. 1.52L drained initially, with 300 cc drained overnight, and 70 cc from 9 am to 11 am 12/14.  - will follow up in the afternoon, and if < 100 cc drained from 9 AM, will pull drain, otherwise will keep until < 100 cc / 24 hours    Sharyn Lujan MD  Cardiology Fellow, PGY-6  [phone]  For all other Cardiology service contact information, go to [email or web address] and use "[employer]" to login.      +++++++++++++++++++++++++++++++++++++++++++++++++++++++++++++++++++++++++      Addendum:    I came to evaluate patient at 5:30 pm.  He was in good spirits, no dyspnea or chest pain.  Drain had 100cc currently in gravity bag.  Would recommend monitoring overnight.  I expect drainage to diminish by tomorrow and if it remains minimal will discontinue drain and recommend discharge in the afternoon.  Patient is agreeable with this plan.    Alexandra Singer MD  Interventional Cardiology Fellow   For all other Cardiology service contact information, go to [email or web address] and use "[employer]" to login. 67M with history of ESRD on HD, HTN, T2DM, Hypothyroidism 2/2 thyroidectomy, asthma presented for echo as part of kidney transplant evaluation s/p pericardiocentesis due to significant amount of pericardial effusion -1.52L.    Pericardial Effusion: s/p drain placement 12/13. 1.52L drained initially, with 300 cc drained overnight, and 70 cc from 9 am to 11 am 12/14.  - will follow up in the afternoon, and if < 100 cc drained from 9 AM, will pull drain, otherwise will keep until < 100 cc / 24 hours    Sharyn Lujan MD  Cardiology Fellow, PGY-6  [phone]  For all other Cardiology service contact information, go to [email or web address] and use "[employer]" to login.      ++++++++++++++++++++++++++++++++++++++++++++++++++++++++++      Addendum:    I came to evaluate patient at 5:30 pm.  He was in good spirits, no dyspnea or chest pain.  Drain had 100cc currently in gravity bag.  Would recommend monitoring overnight.  I expect drainage to diminish by tomorrow and if it remains minimal will discontinue drain and recommend discharge in the afternoon.  Patient is agreeable with this plan.    Alexandra Singer MD  Interventional Cardiology Fellow   For all other Cardiology service contact information, go to [email or web address] and use "[employer]" to login.

## 2022-12-14 NOTE — CONSULT NOTE ADULT - ATTENDING COMMENTS
pt seen AM of 12/14/22  Additional 300 cc drain output since initial placement  with discomfort around the area but no pericarditis-type symptoms    cont pericardial drain  stress testing will need to be rescheduled
Pt. with ESRD on HD TIW. Last outpatient HD was on 12/12/22. Pt. clinically stable. Labs reviewed. Plan for maintenance HD today. Hemoglobin in target range. Monitor BP and labs.

## 2022-12-14 NOTE — CONSULT NOTE ADULT - SUBJECTIVE AND OBJECTIVE BOX
[facility] DIVISION OF KIDNEY DISEASES AND HYPERTENSION -- [phone]  -- INITIAL CONSULT NOTE  --------------------------------------------------------------------------------  HPI:  Erika is a 67 year old male with PMH of HTN, ESRD on HD MWF via L AVF, hypothyroidism who presented to the hospital for an echocardiogram and stress test as part of his work up for kidney transplantation. Pt. was found to have a large volume pericardial effusion and underwent pericardiocentesis of ~1.5L on 12/13/22. The nephrology team was consulted for ESRD and management of dialysis. Pt. was seen and examined today. He admits to having been on dialysis since 5/2022 and undergoes HD at [facility] [place] under the care of Dr. Sukhi Segal. He reports his last outpatient HD session was on 12/12/22. He denied any chest pain, shortness of breath, nausea, vomiting, or abdominal pain.     PAST HISTORY  --------------------------------------------------------------------------------  PAST MEDICAL & SURGICAL HISTORY:  Hard of hearing  Bilateral hearing Aids  Type 2 diabetes mellitus  HTN (hypertension)  Hypothyroidism  Osteomyelitis  ESRD on dialysis  S/P partial thyroidectomy  &#x27;95  benign  H/O laparoscopic adjustable gastric banding  &#x27; 2010  S/P cataract extraction  &#x27;08 and &#x27; 2011   Bilateral  History of foreign body aspiration  7/2018  from Left Ear    FAMILY HISTORY:  Family history of atherosclerosis (Mother)    ALLERGIES & MEDICATIONS  --------------------------------------------------------------------------------  Allergies    aspirin (Unknown)  capsicum (Unknown)  Enalapril Maleate (Unknown)  Enalaprilat (Unknown)  MetFORMIN Hydrochloride (Unknown)  Vasotec (Unknown)    Intolerances    Standing Inpatient Medications  amLODIPine   Tablet 10 milliGRAM(s) Oral <User Schedule>  atorvastatin 40 milliGRAM(s) Oral at bedtime  budesonide  80 MICROgram(s)/formoterol 4.5 MICROgram(s) Inhaler 2 Puff(s) Inhalation daily  calcium acetate 667 milliGRAM(s) Oral three times a day with meals  chlorhexidine 2% Cloths 1 Application(s) Topical daily  folic acid 1 milliGRAM(s) Oral daily  hydrALAZINE 50 milliGRAM(s) Oral <User Schedule>  levothyroxine 125 MICROGram(s) Oral daily  metoprolol succinate ER 50 milliGRAM(s) Oral daily  sodium chloride 0.9% lock flush 3 milliLiter(s) IV Push every 8 hours  tiotropium 2.5 MICROgram(s) Inhaler 2 Puff(s) Inhalation daily    PRN Inpatient Medications  acetaminophen     Tablet .. 650 milliGRAM(s) Oral every 6 hours PRN    REVIEW OF SYSTEMS  --------------------------------------------------------------------------------  Gen: No fever  Skin: No rash  Head/Eyes/Ears: Normal hearing,   Respiratory: No dyspnea, cough  CV: No chest pain  GI: No abdominal pain, diarrhea  MSK: No  edema    All other systems were reviewed and are negative, except as noted.    VITALS/PHYSICAL EXAM  --------------------------------------------------------------------------------  T(C): 36.7 (12-13-22 @ 22:00), Max: 36.7 (12-13-22 @ 22:00)  HR: 95 (12-13-22 @ 22:00) (95 - 95)  BP: 100/55 (12-13-22 @ 22:00) (100/55 - 100/55)  RR: 18 (12-13-22 @ 22:00) (18 - 18)  SpO2: 100% (12-13-22 @ 22:00) (100% - 100%)  Wt(kg): --    Physical Exam:  	Gen: resting, NAD  	HEENT: MMM  	Pulm: CTA B/L  	CV: S1S2  	Abd: Soft, +BS   	Ext: No LE edema B/L  	Neuro: Awake  	Skin: Warm and dry  	Vascular access: LUE AVF with palpable thrill    LABS/STUDIES  --------------------------------------------------------------------------------              10.6   6.55  >-----------<  144      [12-13-22 @ 14:45]              34.6     133  |  92  |  42  ----------------------------<  142      [12-13-22 @ 14:45]  4.0   |  26  |  6.05        Ca     9.5     [12-13-22 @ 14:45]    Creatinine Trend:  SCr 6.05 [12-13 @ 14:45] [facility] DIVISION OF KIDNEY DISEASES AND HYPERTENSION -- [phone]  -- INITIAL CONSULT NOTE  --------------------------------------------------------------------------------  HPI: 67-year-old male with PMH of HTN, ESRD on HD TIW (MWF) via L AVF, hypothyroidism who presented to the hospital for an echocardiogram and stress test as part of his work up for kidney transplantation. Pt. was found to have a large volume pericardial effusion and underwent pericardiocentesis on 12/13/22. The nephrology team was consulted for ESRD/HD management. Pt. was seen and examined today. Pt. says he was started on long term HD in May 2022 and currently undergoes HD at [facility] [place] under the care of Dr. Sukhi Patten. He reports his last outpatient HD session was on 12/12/22. He denied any chest pain, shortness of breath, nausea, vomiting, or abdominal pain. Pt. admits compliance to his HD sessions.    PAST HISTORY  --------------------------------------------------------------------------------  PAST MEDICAL & SURGICAL HISTORY:  Hard of hearing  Bilateral hearing Aids  Type 2 diabetes mellitus  HTN (hypertension)  Hypothyroidism  Osteomyelitis  ESRD on dialysis  S/P partial thyroidectomy  &#x27;95  benign  H/O laparoscopic adjustable gastric banding  &#x27; 2010  S/P cataract extraction  &#x27;08 and &#x27; 2011   Bilateral  History of foreign body aspiration  7/2018  from Left Ear    FAMILY HISTORY:  Family history of atherosclerosis (Mother)    ALLERGIES & MEDICATIONS  --------------------------------------------------------------------------------  Allergies    aspirin (Unknown)  capsicum (Unknown)  Enalapril Maleate (Unknown)  Enalaprilat (Unknown)  MetFORMIN Hydrochloride (Unknown)  Vasotec (Unknown)    Intolerances    Standing Inpatient Medications  amLODIPine   Tablet 10 milliGRAM(s) Oral <User Schedule>  atorvastatin 40 milliGRAM(s) Oral at bedtime  budesonide  80 MICROgram(s)/formoterol 4.5 MICROgram(s) Inhaler 2 Puff(s) Inhalation daily  calcium acetate 667 milliGRAM(s) Oral three times a day with meals  chlorhexidine 2% Cloths 1 Application(s) Topical daily  folic acid 1 milliGRAM(s) Oral daily  hydrALAZINE 50 milliGRAM(s) Oral <User Schedule>  levothyroxine 125 MICROGram(s) Oral daily  metoprolol succinate ER 50 milliGRAM(s) Oral daily  sodium chloride 0.9% lock flush 3 milliLiter(s) IV Push every 8 hours  tiotropium 2.5 MICROgram(s) Inhaler 2 Puff(s) Inhalation daily    PRN Inpatient Medications  acetaminophen     Tablet .. 650 milliGRAM(s) Oral every 6 hours PRN    REVIEW OF SYSTEMS  --------------------------------------------------------------------------------  Gen: No fever  Skin: No rash  Head/Eyes/Ears: No HA,   Respiratory: No dyspnea, cough  CV: See HPI, no chest pain  GI: No abdominal pain, diarrhea  MSK: No  edema    All other systems were reviewed and are negative, except as noted.    VITALS/PHYSICAL EXAM  --------------------------------------------------------------------------------  T(C): 36.7 (12-13-22 @ 22:00), Max: 36.7 (12-13-22 @ 22:00)  HR: 95 (12-13-22 @ 22:00) (95 - 95)  BP: 100/55 (12-13-22 @ 22:00) (100/55 - 100/55)  RR: 18 (12-13-22 @ 22:00) (18 - 18)  SpO2: 100% (12-13-22 @ 22:00) (100% - 100%)  Wt(kg): --    Physical Exam:  	Gen: resting, NAD  	HEENT: MMM  	Pulm: CTA B/L  	CV: S1S2+, midline pericardial catheter/drain seen+   	Abd: Soft, +BS   	Ext: No LE edema B/L  	Neuro: Awake, alert  	Skin: Warm and dry  	Vascular access: LUE AVF with palpable thrill    LABS/STUDIES  --------------------------------------------------------------------------------              10.6   6.55  >-----------<  144      [12-13-22 @ 14:45]              34.6     133  |  92  |  42  ----------------------------<  142      [12-13-22 @ 14:45]  4.0   |  26  |  6.05        Ca     9.5     [12-13-22 @ 14:45]    Creatinine Trend:  SCr 6.05 [12-13 @ 14:45]

## 2022-12-14 NOTE — PATIENT PROFILE ADULT - INTERNATIONAL TRAVEL
Patient transferred from PCU. Patient is alert to self, hx dementia. RA. Tele in place. SCDs on for DVT prophylaxis. Dressing intact with old drainage. Olea in place.  at bedside. Call light within reach, bed alarm active.    Problem: Patient Center baseline  Description: INTERVENTIONS:  - Continuous cardiac monitoring, monitor vital signs, obtain 12 lead EKG if indicated  - Evaluate effectiveness of antiarrhythmic and heart rate control medications as ordered  - Initiate emergency measures for life t relaxation techniques  - Monitor for opioid side effects  - Notify MD/LIP if interventions unsuccessful or patient reports new pain  - Anticipate increased pain with activity and pre-medicate as appropriate  Outcome: Progressing     Problem: RISK FOR INFEC No

## 2022-12-14 NOTE — CONSULT NOTE ADULT - PROBLEM SELECTOR RECOMMENDATION 9
Pt. with ESRD on HD since 5/2022, currently admitted because he was found to have a large volume pericardial effusion and underwent pericardiocentesis. Pt. undergoes HD at [facility] [place] under the care of Dr. Sukhi Segal. He reports his last outpatient HD session was on 12/12/22. Labs reviewed. Pt. clinically stable. Will plan for HD today as per maintenance schedule. Hb at goal. Monitor labs. Avoid nephrotoxins. Dose medications as per eGFR.    If any questions, please feel free to contact me     Edilberto Gerber  Nephrology Fellow  [facility] Pager: [phone] Pt. with ESRD on HD TIW since 5/2022, currently admitted because he was found to have a large volume pericardial effusion and underwent pericardiocentesis. Pt. undergoes HD at [facility] [place] under the care of Dr. Sukhi Patten. He reports his last outpatient HD session was on 12/12/22. Labs reviewed. Pt. clinically stable. Will plan for HD today as per maintenance schedule. Hb at goal. Monitor labs. Avoid nephrotoxins. Dose medications as per eGFR.    If any questions, please feel free to contact me     Edilberto Gerber  Nephrology Fellow  [facility] Pager: [phone]

## 2022-12-14 NOTE — PATIENT PROFILE ADULT - STATED REASON FOR ADMISSION
PT came in for echo as part of pre transplant evaluations. Found to have large pericardial effusion.

## 2022-12-15 ENCOUNTER — TRANSCRIPTION ENCOUNTER (OUTPATIENT)
Age: 67
End: 2022-12-15

## 2022-12-15 VITALS
DIASTOLIC BLOOD PRESSURE: 61 MMHG | SYSTOLIC BLOOD PRESSURE: 102 MMHG | RESPIRATION RATE: 18 BRPM | HEART RATE: 65 BPM | OXYGEN SATURATION: 100 % | TEMPERATURE: 98 F

## 2022-12-15 PROBLEM — N18.6 END STAGE RENAL DISEASE: Chronic | Status: ACTIVE | Noted: 2022-12-13

## 2022-12-15 LAB
ANION GAP SERPL CALC-SCNC: 14 MMOL/L — SIGNIFICANT CHANGE UP (ref 7–14)
BASOPHILS # BLD AUTO: 0.03 K/UL — SIGNIFICANT CHANGE UP (ref 0–0.2)
BASOPHILS NFR BLD AUTO: 0.4 % — SIGNIFICANT CHANGE UP (ref 0–2)
BUN SERPL-MCNC: 40 MG/DL — HIGH (ref 7–23)
CALCIUM SERPL-MCNC: 8.7 MG/DL — SIGNIFICANT CHANGE UP (ref 8.4–10.5)
CHLORIDE SERPL-SCNC: 96 MMOL/L — LOW (ref 98–107)
CO2 SERPL-SCNC: 26 MMOL/L — SIGNIFICANT CHANGE UP (ref 22–31)
CREAT SERPL-MCNC: 6.44 MG/DL — HIGH (ref 0.5–1.3)
EGFR: 9 ML/MIN/1.73M2 — LOW
EOSINOPHIL # BLD AUTO: 0.08 K/UL — SIGNIFICANT CHANGE UP (ref 0–0.5)
EOSINOPHIL NFR BLD AUTO: 1.1 % — SIGNIFICANT CHANGE UP (ref 0–6)
GLUCOSE SERPL-MCNC: 95 MG/DL — SIGNIFICANT CHANGE UP (ref 70–99)
HBV CORE AB SER-ACNC: SIGNIFICANT CHANGE UP
HBV SURFACE AB SER-ACNC: <3 MIU/ML — LOW
HBV SURFACE AG SER-ACNC: SIGNIFICANT CHANGE UP
HCT VFR BLD CALC: 32.8 % — LOW (ref 39–50)
HCV AB S/CO SERPL IA: 0.08 S/CO — SIGNIFICANT CHANGE UP (ref 0–0.99)
HCV AB SERPL-IMP: SIGNIFICANT CHANGE UP
HGB BLD-MCNC: 10.3 G/DL — LOW (ref 13–17)
IANC: 5.76 K/UL — SIGNIFICANT CHANGE UP (ref 1.8–7.4)
IMM GRANULOCYTES NFR BLD AUTO: 0.5 % — SIGNIFICANT CHANGE UP (ref 0–0.9)
LYMPHOCYTES # BLD AUTO: 0.83 K/UL — LOW (ref 1–3.3)
LYMPHOCYTES # BLD AUTO: 11.1 % — LOW (ref 13–44)
MAGNESIUM SERPL-MCNC: 2.3 MG/DL — SIGNIFICANT CHANGE UP (ref 1.6–2.6)
MCHC RBC-ENTMCNC: 31.3 PG — SIGNIFICANT CHANGE UP (ref 27–34)
MCHC RBC-ENTMCNC: 31.4 GM/DL — LOW (ref 32–36)
MCV RBC AUTO: 99.7 FL — SIGNIFICANT CHANGE UP (ref 80–100)
MONOCYTES # BLD AUTO: 0.71 K/UL — SIGNIFICANT CHANGE UP (ref 0–0.9)
MONOCYTES NFR BLD AUTO: 9.5 % — SIGNIFICANT CHANGE UP (ref 2–14)
NEUTROPHILS # BLD AUTO: 5.76 K/UL — SIGNIFICANT CHANGE UP (ref 1.8–7.4)
NEUTROPHILS NFR BLD AUTO: 77.4 % — HIGH (ref 43–77)
NON-GYNECOLOGICAL CYTOLOGY STUDY: SIGNIFICANT CHANGE UP
NRBC # BLD: 0 /100 WBCS — SIGNIFICANT CHANGE UP (ref 0–0)
NRBC # FLD: 0 K/UL — SIGNIFICANT CHANGE UP (ref 0–0)
PHOSPHATE SERPL-MCNC: 3.9 MG/DL — SIGNIFICANT CHANGE UP (ref 2.5–4.5)
PLATELET # BLD AUTO: 141 K/UL — LOW (ref 150–400)
POTASSIUM SERPL-MCNC: 4.3 MMOL/L — SIGNIFICANT CHANGE UP (ref 3.5–5.3)
POTASSIUM SERPL-SCNC: 4.3 MMOL/L — SIGNIFICANT CHANGE UP (ref 3.5–5.3)
RBC # BLD: 3.29 M/UL — LOW (ref 4.2–5.8)
RBC # FLD: 15.9 % — HIGH (ref 10.3–14.5)
SODIUM SERPL-SCNC: 136 MMOL/L — SIGNIFICANT CHANGE UP (ref 135–145)
WBC # BLD: 7.45 K/UL — SIGNIFICANT CHANGE UP (ref 3.8–10.5)
WBC # FLD AUTO: 7.45 K/UL — SIGNIFICANT CHANGE UP (ref 3.8–10.5)

## 2022-12-15 RX ADMIN — SODIUM CHLORIDE 3 MILLILITER(S): 9 INJECTION INTRAMUSCULAR; INTRAVENOUS; SUBCUTANEOUS at 06:24

## 2022-12-15 RX ADMIN — TIOTROPIUM BROMIDE 2 PUFF(S): 18 CAPSULE ORAL; RESPIRATORY (INHALATION) at 01:22

## 2022-12-15 RX ADMIN — Medication 667 MILLIGRAM(S): at 09:18

## 2022-12-15 RX ADMIN — CHLORHEXIDINE GLUCONATE 1 APPLICATION(S): 213 SOLUTION TOPICAL at 11:40

## 2022-12-15 RX ADMIN — Medication 1 MILLIGRAM(S): at 11:41

## 2022-12-15 RX ADMIN — Medication 667 MILLIGRAM(S): at 11:41

## 2022-12-15 NOTE — CHART NOTE - NSCHARTNOTEFT_GEN_A_CORE
Attempted to make cardiology follow up appointment for patient ([phone]) however per , system is down. Advised patient to call and make an appointment upon discharge.

## 2022-12-15 NOTE — DISCHARGE NOTE NURSING/CASE MANAGEMENT/SOCIAL WORK - NSDCPEFALRISK_GEN_ALL_CORE
For information on Fall & Injury Prevention, visit: [email or web address] OR  [email or web address] OR  https://www.cdc.gov/steadi/patient.html

## 2022-12-15 NOTE — DISCHARGE NOTE NURSING/CASE MANAGEMENT/SOCIAL WORK - PATIENT PORTAL LINK FT
You can access the FollowMyHealth Patient Portal offered by [facility] by registering at the following website: [email or web address]. By joining [employer]’s FollowMyHealth portal, you will also be able to view your health information using other applications (apps) compatible with our system.

## 2022-12-15 NOTE — PROGRESS NOTE ADULT - ASSESSMENT
67M with history of ESRD on HD, HTN, T2DM, Hypothyroidism 2/2 thyroidectomy, asthma presented for echo as part of kidney transplant evaluation s/p pericardiocentesis and pericardial drain.    Pericardial Effusion: s/p drain placement 12/13.     - approximately 2 L removed, limited echo post procedure showed resolution of tamponade and significantly reduced effusion  - pericardial fluid studies pending  - now has had diminished output overnight, I have removed the drain, dressed the wound, and provided instructions on wound management  - patient can be discharged with follow up with Interventional Cardiology: Dr Francesco Koroma in 3 weeks    Alexandra Singer MD  Interventional Cardiology Fellow   For all other Cardiology service contact information, go to [email or web address] and use "[email or web address]" to login.

## 2022-12-15 NOTE — DISCHARGE NOTE PROVIDER - HOSPITAL COURSE
66y/o M pmhx HTN, ESRD on HD (MWF) via L AVF, hypothyroidism 2/2 thyroidectomy, T2DM, asthma, presented for echo as part of pre kidney-transplant evaluation. Patient found to have large pericardial effusion and referred for pericardiocentesis.     Pericardial Effusion/ Cardiac Tamponade  - s/p pericardiocentesis 1.5L drained, drain (gravity bag) placed and removed   - limited echo post procedure showed resolution of tamponade and significantly reduced effusion   - pericardial fluid studies pending   - f/u outpatient with interventional cardiology Dr. Koroma in 3 weeks     ESRD on HD   - followed by renal   - HD per renal schedule     Patient is medically optimized for discharge. Case discussed with Dr. Alexandra Singer.

## 2022-12-15 NOTE — PROGRESS NOTE ADULT - SUBJECTIVE AND OBJECTIVE BOX
Patient seen and examined at bedside.    Overnight Events:  no overnight events    ROS: no chest pain, dyspnea    Current Meds:  acetaminophen     Tablet .. 650 milliGRAM(s) Oral every 6 hours PRN  amLODIPine   Tablet 10 milliGRAM(s) Oral <User Schedule>  atorvastatin 40 milliGRAM(s) Oral at bedtime  budesonide  80 MICROgram(s)/formoterol 4.5 MICROgram(s) Inhaler 2 Puff(s) Inhalation daily  calcium acetate 667 milliGRAM(s) Oral three times a day with meals  chlorhexidine 2% Cloths 1 Application(s) Topical daily  folic acid 1 milliGRAM(s) Oral daily  hydrALAZINE 50 milliGRAM(s) Oral <User Schedule>  levothyroxine 125 MICROGram(s) Oral daily  lidocaine/prilocaine Cream 1 Application(s) Topical daily  metoprolol succinate ER 50 milliGRAM(s) Oral daily  sodium chloride 0.9% lock flush 3 milliLiter(s) IV Push every 8 hours  tiotropium 2.5 MICROgram(s) Inhaler 2 Puff(s) Inhalation daily      Vitals:  T(F): 97.7 (12-15), Max: 98.6 (12-15)  HR: 62 (12-15) (62 - 95)  BP: 106/58 (12-15) (103/56 - 144/70)  RR: 18 (12-15)  SpO2: 99% (12-15)  I&O's Summary    14 Dec 2022 07:01  -  15 Dec 2022 07:00  --------------------------------------------------------  IN: 400 mL / OUT: 2725 mL / NET: -2325 mL      Physical Exam:  Appearance: No Acute Distress  HEENT: No JVD  Cardiovascular: Normal S1 S2, No murmurs/rubs/gallops  Respiratory: Clear to auscultation bilaterally  Gastrointestinal: Soft, Non-tender	  Skin: No cyanosis	  Neurologic: Non-focal    Pericardial drain: minimal output 5-10cc remaining               10.3   7.45  )-----------( 141      ( 15 Dec 2022 06:35 )             32.8     12-15    136  |  96<L>  |  40<H>  ----------------------------<  95  4.3   |  26  |  6.44<H>    Ca    8.7      15 Dec 2022 06:35  Phos  3.9     12-15  Mg     2.30     12-15    TPro  6.2  /  Alb  3.3  /  TBili  0.4  /  DBili  x   /  AST  24  /  ALT  19  /  AlkPhos  281<H>  12-14    
Patient seen and examined at bedside.    Overnight Events: No CP, SOB. Pericardial drain emptied at 9 AM this morning, 300 cc. Checked on patient at 11 am, approx 70 cc more drained, with clot.     Current Meds:  acetaminophen     Tablet .. 650 milliGRAM(s) Oral every 6 hours PRN  amLODIPine   Tablet 10 milliGRAM(s) Oral <User Schedule>  atorvastatin 40 milliGRAM(s) Oral at bedtime  budesonide  80 MICROgram(s)/formoterol 4.5 MICROgram(s) Inhaler 2 Puff(s) Inhalation daily  calcium acetate 667 milliGRAM(s) Oral three times a day with meals  chlorhexidine 2% Cloths 1 Application(s) Topical daily  folic acid 1 milliGRAM(s) Oral daily  hydrALAZINE 50 milliGRAM(s) Oral <User Schedule>  levothyroxine 125 MICROGram(s) Oral daily  lidocaine/prilocaine Cream 1 Application(s) Topical daily  metoprolol succinate ER 50 milliGRAM(s) Oral daily  sodium chloride 0.9% lock flush 3 milliLiter(s) IV Push every 8 hours  tiotropium 2.5 MICROgram(s) Inhaler 2 Puff(s) Inhalation daily    Vitals:  T(F): 97.8 (12-14), Max: 98 (12-13)  HR: 88 (12-14) (83 - 95)  BP: 106/62 (12-14) (100/55 - 106/62)  RR: 18 (12-14)  SpO2: 100% (12-14)  I&O's Summary    Physical Exam:  Appearance: No acute distress; well appearing  Eyes:  EOMI, pink conjunctiva  HENT: Normal oral mucosa  Cardiovascular: RRR, S1, S2, no murmurs, rubs, or gallops; no edema; no JVD  Respiratory: Clear to auscultation bilaterally  Gastrointestinal: soft, non-tender, non-distended with normal bowel sounds  Musculoskeletal: No clubbing; no joint deformity   Neurologic: Non-focal  Lymphatic: No lymphadenopathy  Psychiatry: AAOx3, mood & affect appropriate  Skin: No rashes               10.6   6.55  )-----------( 144      ( 13 Dec 2022 14:45 )             34.6     12-13    133<L>  |  92<L>  |  42<H>  ----------------------------<  142<H>  4.0   |  26  |  6.05<H>    Ca    9.5      13 Dec 2022 14:45    Total Cholesterol: 94  LDL: --  HDL: 35  T    New ECG(s): Personally reviewed    Echo:    Stress Testing:     Cath:    Imaging:

## 2022-12-15 NOTE — DISCHARGE NOTE PROVIDER - NSDCCPCAREPLAN_GEN_ALL_CORE_FT
PRINCIPAL DISCHARGE DIAGNOSIS  Diagnosis: Pericardial effusion, acute  Assessment and Plan of Treatment: You were admitted for management of pericardial effusion/ cardiac tamponade (fluid collection around your heart that was affecting its ability to function properly). The interventional cardiologists were following you during your stay. You had a pericardiocentesis (procedure to drain this fluid) along with a drainage bag temporarily placed. This was removed. A repeat echocardiogram showed resolution of the fluid build-up around your heart.   Please follow up with Dr. Francesco Koroma 3 weeks upon discharge, please call to make an appointment. Continue with the wound management instructions provided to you per the cardiologists. Follow up with your primary care provider 1-2 weeks upon discharge as well, for further monitoring and management.      SECONDARY DISCHARGE DIAGNOSES  Diagnosis: ESRD on hemodialysis  Assessment and Plan of Treatment: Please continue to follow your dialysis schedule and refer to your primary provider/nephrologist for further care and monitoring of kidney function and electrolytes. Continue a renal restricted diet (Avoiding foods high in potassium and phosphorus), your prescribed medications, and supplementations as directed.

## 2022-12-15 NOTE — DISCHARGE NOTE PROVIDER - NSDCMRMEDTOKEN_GEN_ALL_CORE_FT
Albuterol (Eqv-Proventil HFA) 90 mcg/inh inhalation aerosol: 2 puff(s) inhaled every 6 hours, As Needed  amLODIPine 10 mg oral tablet: 1 tab(s) orally once a day not on HD day  biotin 2.5 mg oral tablet: 1 tab(s) orally once a day  calcium acetate 667 mg oral tablet: 3 tab(s) orally 3 times a day  folic acid 1 mg oral tablet: 1 tab(s) orally once a day  hydrALAZINE 50 mg oral tablet: 1 tab(s) orally 2 times a day not on HD day  levothyroxine 125 mcg (0.125 mg) oral capsule: 1 cap(s) orally once a day (at bedtime)  Metoprolol Succinate ER 50 mg oral tablet, extended release: 1 tab(s) orally once a day (at bedtime)  rosuvastatin 10 mg oral tablet: 1 tab(s) orally once a day (at bedtime)  Trelegy Ellipta 100 mcg-62.5 mcg-25 mcg/inh inhalation powder: 1 puff(s) inhaled once a day

## 2022-12-15 NOTE — DISCHARGE NOTE PROVIDER - CARE PROVIDER_API CALL
Francesco Koroma (MD)  Cardiovascular Disease; Internal Medicine; Interventional Cardiology  [phone] [place] [address], [address]  [place] [address]  Phone: [phone]  Fax: [phone]  Follow Up Time:     Sukhi Patten  INTERNAL MEDICINE  [address] [place] [address]  Phone: [phone]  Fax: [phone]  Follow Up Time:

## 2022-12-18 LAB
CULTURE RESULTS: SIGNIFICANT CHANGE UP
SPECIMEN SOURCE: SIGNIFICANT CHANGE UP

## 2023-01-03 DIAGNOSIS — I31.39 OTHER PERICARDIAL EFFUSION (NONINFLAMMATORY): ICD-10-CM

## 2023-01-05 ENCOUNTER — APPOINTMENT (OUTPATIENT)
Dept: CARDIOLOGY | Facility: CLINIC | Age: 68
End: 2023-01-05
Payer: MEDICARE

## 2023-01-05 ENCOUNTER — NON-APPOINTMENT (OUTPATIENT)
Age: 68
End: 2023-01-05

## 2023-01-05 VITALS
HEIGHT: 72 IN | DIASTOLIC BLOOD PRESSURE: 73 MMHG | WEIGHT: 200 LBS | OXYGEN SATURATION: 100 % | HEART RATE: 63 BPM | SYSTOLIC BLOOD PRESSURE: 134 MMHG | BODY MASS INDEX: 27.09 KG/M2

## 2023-01-05 PROCEDURE — 99214 OFFICE O/P EST MOD 30 MIN: CPT

## 2023-01-05 PROCEDURE — 93000 ELECTROCARDIOGRAM COMPLETE: CPT

## 2023-01-11 NOTE — HISTORY OF PRESENT ILLNESS
[FreeTextEntry1] : Doing okay since pericardiocentesis. Seems to be able to take deeper breaths (breathing better). Denies chest pain, shortness of breath or palpitations.

## 2023-01-11 NOTE — PHYSICAL EXAM
[Well Developed] : well developed [Well Nourished] : well nourished [No Acute Distress] : no acute distress [Normal Conjunctiva] : normal conjunctiva [Normal Venous Pressure] : normal venous pressure [No Carotid Bruit] : no carotid bruit [Normal S1, S2] : normal S1, S2 [No Murmur] : no murmur [No Rub] : no rub [No Gallop] : no gallop [Clear Lung Fields] : clear lung fields [Good Air Entry] : good air entry [No Respiratory Distress] : no respiratory distress  [Soft] : abdomen soft [Non Tender] : non-tender [Normal Gait] : normal gait [No Cyanosis] : no cyanosis [No Rash] : no rash [No Skin Lesions] : no skin lesions [Moves all extremities] : moves all extremities [No Focal Deficits] : no focal deficits [Normal Speech] : normal speech [Alert and Oriented] : alert and oriented [de-identified] : bilateral trace LE pitting edema

## 2023-01-11 NOTE — DISCUSSION/SUMMARY
[Hypertension] : hypertension [Stable] : stable [Low Sodium Diet] : low sodium diet [FreeTextEntry1] : \par Currently stable from a cardiovascular standpoint. Normotensive. Appears to be in mild volume overload. No ischemic or CHF symptoms. Recent pericardiocentesis for management of large pericardial effusion with tamponade physiology on echo. Currently doing well post pericardial tap. Continue current medications. ECG completed today and reviewed (findings as noted above). Last echo and pericardiocentesis results reviewed (no malignant cells). Will reschedule pharmacologic nuclear stress test to rule out any significant CAD (only able to achieve 4 METS last time). At this time, patient is considered an acceptable risk from a cardiac standpoint for renal transplant. Follow up annually pre-transplant. [EKG obtained to assist in diagnosis and management of assessed problem(s)] : EKG obtained to assist in diagnosis and management of assessed problem(s)

## 2023-01-11 NOTE — CARDIOLOGY SUMMARY
[de-identified] : \par 01/05/23 - sinus rhythm, first degree AV block, RBBB, LAFB\par  [de-identified] : \par 12/11/20 (exercise tetrofosmin) - 4 METS, 96% MPHR, 04:20 min, Duke score -5, no evidence of infarction or inducible ischemia, LVEF 56%\par  [de-identified] : \par 12/13/22 - no obvious pericardial effusion immediately following pericardiocentesis\par 12/13/22 - MAC, normal LA, grossly normal LV systolic function, normal RV size and function, large pericardial effusion with RA and RV diastolic collapse consistent with cardiac tamponade, left pleural effusion, ascites seen, LVEF 74%\par 07/30/20 - mild MAC, normal LV and RV size and function, LVEF 73%\par  [de-identified] : \par 12/13/22 (pericardiocentesis) - drained 1520 mL of bloody pericardial fluid via subxiphoid approach

## 2023-01-17 ENCOUNTER — OUTPATIENT (OUTPATIENT)
Dept: OUTPATIENT SERVICES | Facility: HOSPITAL | Age: 68
LOS: 1 days | End: 2023-01-17

## 2023-01-17 ENCOUNTER — APPOINTMENT (OUTPATIENT)
Dept: CV DIAGNOSTICS | Facility: HOSPITAL | Age: 68
End: 2023-01-17
Payer: COMMERCIAL

## 2023-01-17 DIAGNOSIS — E11.9 TYPE 2 DIABETES MELLITUS WITHOUT COMPLICATIONS: ICD-10-CM

## 2023-01-17 DIAGNOSIS — Z98.84 BARIATRIC SURGERY STATUS: Chronic | ICD-10-CM

## 2023-01-17 DIAGNOSIS — Z98.890 OTHER SPECIFIED POSTPROCEDURAL STATES: Chronic | ICD-10-CM

## 2023-01-17 DIAGNOSIS — I10 ESSENTIAL (PRIMARY) HYPERTENSION: ICD-10-CM

## 2023-01-17 DIAGNOSIS — Z87.898 PERSONAL HISTORY OF OTHER SPECIFIED CONDITIONS: Chronic | ICD-10-CM

## 2023-01-17 DIAGNOSIS — Z01.818 ENCOUNTER FOR OTHER PREPROCEDURAL EXAMINATION: ICD-10-CM

## 2023-01-17 DIAGNOSIS — Z98.49 CATARACT EXTRACTION STATUS, UNSPECIFIED EYE: Chronic | ICD-10-CM

## 2023-01-17 PROCEDURE — 93018 CV STRESS TEST I&R ONLY: CPT | Mod: GC

## 2023-01-17 PROCEDURE — 78452 HT MUSCLE IMAGE SPECT MULT: CPT | Mod: 26,MH

## 2023-01-17 PROCEDURE — 93016 CV STRESS TEST SUPVJ ONLY: CPT | Mod: GC

## 2023-01-28 LAB
CULTURE RESULTS: SIGNIFICANT CHANGE UP
SPECIMEN SOURCE: SIGNIFICANT CHANGE UP

## 2023-04-27 ENCOUNTER — NON-APPOINTMENT (OUTPATIENT)
Age: 68
End: 2023-04-27

## 2023-04-27 ENCOUNTER — APPOINTMENT (OUTPATIENT)
Dept: CARDIOLOGY | Facility: CLINIC | Age: 68
End: 2023-04-27
Payer: MEDICARE

## 2023-04-27 VITALS
SYSTOLIC BLOOD PRESSURE: 197 MMHG | HEART RATE: 61 BPM | HEIGHT: 72 IN | DIASTOLIC BLOOD PRESSURE: 96 MMHG | OXYGEN SATURATION: 100 %

## 2023-04-27 VITALS — DIASTOLIC BLOOD PRESSURE: 79 MMHG | SYSTOLIC BLOOD PRESSURE: 165 MMHG

## 2023-04-27 PROCEDURE — 93000 ELECTROCARDIOGRAM COMPLETE: CPT

## 2023-04-27 PROCEDURE — 99213 OFFICE O/P EST LOW 20 MIN: CPT

## 2023-04-27 RX ORDER — HYDRALAZINE HYDROCHLORIDE 50 MG/1
50 TABLET ORAL
Qty: 180 | Refills: 0 | Status: DISCONTINUED | COMMUNITY
Start: 2022-06-27 | End: 2023-04-27

## 2023-04-27 NOTE — CARDIOLOGY SUMMARY
[de-identified] : \par 04/27/23 - normal sinus rhythm, first degree AV block, RBBB, LAFB\par  [de-identified] : \par 01/17/23 (regadenoson tetrofosmin) - no evidence of infarction or inducible ischemia, LVEF 62%\par 12/11/20 (exercise tetrofosmin) - 4 METS, 96% MPHR, 04:20 min, Duke score -5, no evidence of infarction or inducible ischemia, LVEF 56%\par  [de-identified] : \par 12/13/22 - no obvious pericardial effusion immediately following pericardiocentesis\par 12/13/22 - MAC, normal LA, grossly normal LV systolic function, normal RV size and function, large pericardial effusion with RA and RV diastolic collapse consistent with cardiac tamponade, left pleural effusion, ascites seen, LVEF 74%\par 07/30/20 - mild MAC, normal LV and RV size and function, LVEF 73%\par  [de-identified] : \par 12/13/22 (pericardiocentesis) - drained 1520 mL of bloody pericardial fluid via subxiphoid approach

## 2023-04-27 NOTE — DISCUSSION/SUMMARY
[Hypertension] : hypertension [Stable] : stable [Low Sodium Diet] : low sodium diet [FreeTextEntry1] : \par Currently stable from a cardiovascular standpoint. Hypertensive this morning. Appears euvolemic. No ischemic or CHF symptoms. Patient to discuss with PCP/nephrology on BP medications. Consider increasing amlodipine to 10 mg daily. Continue current medications. ECG completed today and reviewed (findings as noted above). Stress test from January reviewed. At this time, patient is considered an acceptable risk from a cardiac standpoint for renal transplant. Follow up annually pre-transplant. Advised patient to have an echo completed with primary cardiologist in next couple months as follow up for pericardial effusion (s/p tap). [EKG obtained to assist in diagnosis and management of assessed problem(s)] : EKG obtained to assist in diagnosis and management of assessed problem(s)

## 2023-06-07 ENCOUNTER — NON-APPOINTMENT (OUTPATIENT)
Age: 68
End: 2023-06-07

## 2023-06-08 ENCOUNTER — APPOINTMENT (OUTPATIENT)
Dept: NEPHROLOGY | Facility: CLINIC | Age: 68
End: 2023-06-08

## 2023-06-14 LAB
ABO + RH PNL BLD: NORMAL
ALBUMIN SERPL ELPH-MCNC: 4.9 G/DL
ALP BLD-CCNC: 192 U/L
ALT SERPL-CCNC: 49 U/L
ANION GAP SERPL CALC-SCNC: 18 MMOL/L
AST SERPL-CCNC: 36 U/L
BILIRUB SERPL-MCNC: 0.4 MG/DL
BUN SERPL-MCNC: 73 MG/DL
C PEPTIDE SERPL-MCNC: 9.5 NG/ML
CALCIUM SERPL-MCNC: 9.7 MG/DL
CHLORIDE SERPL-SCNC: 94 MMOL/L
CHOLEST SERPL-MCNC: 133 MG/DL
CK SERPL-CCNC: 118 U/L
CMV IGG SERPL QL: <0.2 U/ML
CMV IGG SERPL-IMP: NEGATIVE
CO2 SERPL-SCNC: 24 MMOL/L
COVID-19 SPIKE DOMAIN ANTIBODY INTERPRETATION: POSITIVE
CREAT SERPL-MCNC: 8.74 MG/DL
CRP SERPL-MCNC: <3 MG/L
EBV EA AB SER IA-ACNC: <5 U/ML
EBV EA AB TITR SER IF: POSITIVE
EBV EA IGG SER QL IA: 357 U/ML
EBV EA IGG SER-ACNC: NEGATIVE
EBV EA IGM SER IA-ACNC: NEGATIVE
EBV PATRN SPEC IB-IMP: NORMAL
EBV VCA IGG SER IA-ACNC: 227 U/ML
EBV VCA IGM SER QL IA: <10 U/ML
EGFR: 6 ML/MIN/1.73M2
EPSTEIN-BARR VIRUS CAPSID ANTIGEN IGG: POSITIVE
ERYTHROCYTE [SEDIMENTATION RATE] IN BLOOD BY WESTERGREN METHOD: 24 MM/HR
ESTIMATED AVERAGE GLUCOSE: 105 MG/DL
GLUCOSE SERPL-MCNC: 126 MG/DL
HAV IGM SER QL: NONREACTIVE
HBA1C MFR BLD HPLC: 5.3 %
HBV CORE IGG+IGM SER QL: NONREACTIVE
HBV SURFACE AB SER QL: NONREACTIVE
HBV SURFACE AB SERPL IA-ACNC: <3 MIU/ML
HBV SURFACE AG SER QL: NONREACTIVE
HCV AB SER QL: NONREACTIVE
HCV S/CO RATIO: 0.08 S/CO
HDLC SERPL-MCNC: 63 MG/DL
HEPATITIS A IGG ANTIBODY: NONREACTIVE
HIV1+2 AB SPEC QL IA.RAPID: NONREACTIVE
HSV 1+2 IGG SER IA-IMP: NEGATIVE
HSV1 IGG SER QL: 0.03 INDEX
HSV1 IGG SER QL: 0.03 INDEX
HSV2 IGG SER QL: 0.07 INDEX
LDLC SERPL CALC-MCNC: 49 MG/DL
M TB IFN-G BLD-IMP: NEGATIVE
MAGNESIUM SERPL-MCNC: 2.6 MG/DL
NONHDLC SERPL-MCNC: 71 MG/DL
PHOSPHATE SERPL-MCNC: 6 MG/DL
POTASSIUM SERPL-SCNC: 5.3 MMOL/L
PROT SERPL-MCNC: 7.6 G/DL
PSA SERPL-MCNC: 0.5 NG/ML
QUANTIFERON TB PLUS MITOGEN MINUS NIL: 5.06 IU/ML
QUANTIFERON TB PLUS NIL: 0.03 IU/ML
QUANTIFERON TB PLUS TB1 MINUS NIL: -0.01 IU/ML
QUANTIFERON TB PLUS TB2 MINUS NIL: -0.01 IU/ML
RUBV IGG FLD-ACNC: 29.5 INDEX
RUBV IGG SER-IMP: POSITIVE
SARS-COV-2 AB SERPL IA-ACNC: 233 U/ML
SODIUM SERPL-SCNC: 136 MMOL/L
T GONDII AB SER-IMP: POSITIVE
T GONDII IGG SER QL: 226 IU/ML
T PALLIDUM AB SER QL IA: NEGATIVE
T3 SERPL-MCNC: 216 NG/DL
T4 FREE SERPL-MCNC: 1.6 NG/DL
TRIGL SERPL-MCNC: 106 MG/DL
TSH SERPL-ACNC: 0.28 UIU/ML
URATE SERPL-MCNC: 4.5 MG/DL
VZV AB TITR SER: POSITIVE
VZV IGG SER IF-ACNC: >4000 INDEX

## 2023-06-15 ENCOUNTER — APPOINTMENT (OUTPATIENT)
Dept: RADIOLOGY | Facility: CLINIC | Age: 68
End: 2023-06-15
Payer: COMMERCIAL

## 2023-06-15 ENCOUNTER — OUTPATIENT (OUTPATIENT)
Dept: OUTPATIENT SERVICES | Facility: HOSPITAL | Age: 68
LOS: 1 days | End: 2023-06-15
Payer: COMMERCIAL

## 2023-06-15 DIAGNOSIS — Z98.49 CATARACT EXTRACTION STATUS, UNSPECIFIED EYE: Chronic | ICD-10-CM

## 2023-06-15 DIAGNOSIS — Z98.84 BARIATRIC SURGERY STATUS: Chronic | ICD-10-CM

## 2023-06-15 DIAGNOSIS — Z98.890 OTHER SPECIFIED POSTPROCEDURAL STATES: Chronic | ICD-10-CM

## 2023-06-15 DIAGNOSIS — Z01.818 ENCOUNTER FOR OTHER PREPROCEDURAL EXAMINATION: ICD-10-CM

## 2023-06-15 DIAGNOSIS — Z87.898 PERSONAL HISTORY OF OTHER SPECIFIED CONDITIONS: Chronic | ICD-10-CM

## 2023-06-15 PROCEDURE — 71046 X-RAY EXAM CHEST 2 VIEWS: CPT | Mod: 26

## 2023-06-15 PROCEDURE — 71046 X-RAY EXAM CHEST 2 VIEWS: CPT

## 2023-06-23 ENCOUNTER — NON-APPOINTMENT (OUTPATIENT)
Age: 68
End: 2023-06-23

## 2023-07-18 ENCOUNTER — NON-APPOINTMENT (OUTPATIENT)
Age: 68
End: 2023-07-18

## 2023-12-26 ENCOUNTER — NON-APPOINTMENT (OUTPATIENT)
Age: 68
End: 2023-12-26

## 2024-03-01 ENCOUNTER — NON-APPOINTMENT (OUTPATIENT)
Age: 69
End: 2024-03-01

## 2024-03-07 ENCOUNTER — APPOINTMENT (OUTPATIENT)
Dept: CARDIOLOGY | Facility: CLINIC | Age: 69
End: 2024-03-07
Payer: COMMERCIAL

## 2024-03-07 ENCOUNTER — NON-APPOINTMENT (OUTPATIENT)
Age: 69
End: 2024-03-07

## 2024-03-07 VITALS
OXYGEN SATURATION: 100 % | SYSTOLIC BLOOD PRESSURE: 128 MMHG | HEART RATE: 97 BPM | BODY MASS INDEX: 27.71 KG/M2 | HEIGHT: 72 IN | DIASTOLIC BLOOD PRESSURE: 85 MMHG | WEIGHT: 204.59 LBS

## 2024-03-07 PROCEDURE — 93000 ELECTROCARDIOGRAM COMPLETE: CPT

## 2024-03-07 PROCEDURE — 99213 OFFICE O/P EST LOW 20 MIN: CPT

## 2024-03-07 RX ORDER — AMLODIPINE BESYLATE 5 MG/1
5 TABLET ORAL DAILY
Qty: 90 | Refills: 3 | Status: DISCONTINUED | COMMUNITY
Start: 2021-12-20 | End: 2024-03-07

## 2024-03-07 RX ORDER — ROSUVASTATIN CALCIUM 10 MG/1
10 TABLET, FILM COATED ORAL
Qty: 90 | Refills: 0 | Status: DISCONTINUED | COMMUNITY
Start: 2022-03-22 | End: 2024-03-07

## 2024-03-07 RX ORDER — METOPROLOL SUCCINATE 50 MG/1
50 TABLET, EXTENDED RELEASE ORAL DAILY
Refills: 0 | Status: DISCONTINUED | COMMUNITY
Start: 2020-09-21 | End: 2024-03-07

## 2024-03-13 ENCOUNTER — LABORATORY RESULT (OUTPATIENT)
Age: 69
End: 2024-03-13

## 2024-03-13 ENCOUNTER — NON-APPOINTMENT (OUTPATIENT)
Age: 69
End: 2024-03-13

## 2024-03-14 ENCOUNTER — OUTPATIENT (OUTPATIENT)
Dept: OUTPATIENT SERVICES | Facility: HOSPITAL | Age: 69
LOS: 1 days | End: 2024-03-14
Payer: MEDICARE

## 2024-03-14 ENCOUNTER — APPOINTMENT (OUTPATIENT)
Dept: CT IMAGING | Facility: IMAGING CENTER | Age: 69
End: 2024-03-14
Payer: COMMERCIAL

## 2024-03-14 ENCOUNTER — APPOINTMENT (OUTPATIENT)
Dept: TRANSPLANT | Facility: CLINIC | Age: 69
End: 2024-03-14
Payer: MEDICARE

## 2024-03-14 ENCOUNTER — OUTPATIENT (OUTPATIENT)
Dept: OUTPATIENT SERVICES | Facility: HOSPITAL | Age: 69
LOS: 1 days | End: 2024-03-14
Payer: COMMERCIAL

## 2024-03-14 ENCOUNTER — APPOINTMENT (OUTPATIENT)
Dept: HEPATOLOGY | Facility: CLINIC | Age: 69
End: 2024-03-14
Payer: MEDICARE

## 2024-03-14 VITALS
WEIGHT: 200.62 LBS | BODY MASS INDEX: 27.17 KG/M2 | RESPIRATION RATE: 14 BRPM | DIASTOLIC BLOOD PRESSURE: 84 MMHG | HEIGHT: 72 IN | TEMPERATURE: 98.2 F | HEART RATE: 74 BPM | OXYGEN SATURATION: 100 % | SYSTOLIC BLOOD PRESSURE: 137 MMHG

## 2024-03-14 VITALS
SYSTOLIC BLOOD PRESSURE: 111 MMHG | DIASTOLIC BLOOD PRESSURE: 77 MMHG | RESPIRATION RATE: 16 BRPM | HEART RATE: 86 BPM | TEMPERATURE: 98 F | WEIGHT: 202.6 LBS | OXYGEN SATURATION: 100 % | HEIGHT: 69.5 IN

## 2024-03-14 DIAGNOSIS — Z87.898 PERSONAL HISTORY OF OTHER SPECIFIED CONDITIONS: Chronic | ICD-10-CM

## 2024-03-14 DIAGNOSIS — I10 ESSENTIAL (PRIMARY) HYPERTENSION: ICD-10-CM

## 2024-03-14 DIAGNOSIS — Z98.49 CATARACT EXTRACTION STATUS, UNSPECIFIED EYE: Chronic | ICD-10-CM

## 2024-03-14 DIAGNOSIS — N18.6 END STAGE RENAL DISEASE: ICD-10-CM

## 2024-03-14 DIAGNOSIS — Z29.9 ENCOUNTER FOR PROPHYLACTIC MEASURES, UNSPECIFIED: ICD-10-CM

## 2024-03-14 DIAGNOSIS — Z98.84 BARIATRIC SURGERY STATUS: Chronic | ICD-10-CM

## 2024-03-14 DIAGNOSIS — Z01.818 ENCOUNTER FOR OTHER PREPROCEDURAL EXAMINATION: ICD-10-CM

## 2024-03-14 DIAGNOSIS — Z98.890 OTHER SPECIFIED POSTPROCEDURAL STATES: Chronic | ICD-10-CM

## 2024-03-14 DIAGNOSIS — N18.9 CHRONIC KIDNEY DISEASE, UNSPECIFIED: ICD-10-CM

## 2024-03-14 DIAGNOSIS — Z00.8 ENCOUNTER FOR OTHER GENERAL EXAMINATION: ICD-10-CM

## 2024-03-14 DIAGNOSIS — Z89.429 ACQUIRED ABSENCE OF OTHER TOE(S), UNSPECIFIED SIDE: Chronic | ICD-10-CM

## 2024-03-14 LAB
BLD GP AB SCN SERPL QL: NEGATIVE — SIGNIFICANT CHANGE UP
RH IG SCN BLD-IMP: POSITIVE — SIGNIFICANT CHANGE UP

## 2024-03-14 PROCEDURE — 99215 OFFICE O/P EST HI 40 MIN: CPT

## 2024-03-14 PROCEDURE — 86850 RBC ANTIBODY SCREEN: CPT

## 2024-03-14 PROCEDURE — G0463: CPT

## 2024-03-14 PROCEDURE — 74177 CT ABD & PELVIS W/CONTRAST: CPT

## 2024-03-14 PROCEDURE — 74177 CT ABD & PELVIS W/CONTRAST: CPT | Mod: 26,MH

## 2024-03-14 PROCEDURE — 86900 BLOOD TYPING SEROLOGIC ABO: CPT

## 2024-03-14 PROCEDURE — 86901 BLOOD TYPING SEROLOGIC RH(D): CPT

## 2024-03-14 PROCEDURE — 99204 OFFICE O/P NEW MOD 45 MIN: CPT

## 2024-03-14 RX ORDER — METOPROLOL TARTRATE 50 MG
1 TABLET ORAL
Qty: 0 | Refills: 0 | DISCHARGE

## 2024-03-14 RX ORDER — CALCIUM ACETATE 667 MG
3 TABLET ORAL
Qty: 0 | Refills: 0 | DISCHARGE

## 2024-03-14 RX ORDER — AMLODIPINE BESYLATE 2.5 MG/1
1 TABLET ORAL
Qty: 0 | Refills: 0 | DISCHARGE

## 2024-03-14 RX ORDER — HYDRALAZINE HCL 50 MG
1 TABLET ORAL
Qty: 0 | Refills: 0 | DISCHARGE

## 2024-03-14 RX ORDER — ROSUVASTATIN CALCIUM 5 MG/1
1 TABLET ORAL
Qty: 0 | Refills: 0 | DISCHARGE

## 2024-03-14 RX ORDER — FLUTICASONE FUROATE, UMECLIDINIUM BROMIDE AND VILANTEROL TRIFENATATE 200; 62.5; 25 UG/1; UG/1; UG/1
1 POWDER RESPIRATORY (INHALATION)
Qty: 0 | Refills: 0 | DISCHARGE

## 2024-03-14 NOTE — H&P PST ADULT - ASSESSMENT
Open living kidney transplant recipient to the right side, possible left on 24.    Activity:   physically active , walks 1/4 mile daily, take 1-2 flights of stairs with no SOB, SEALS/Palpitations  Energy Expenditure score (DASI SCORE METS): 5.4  Symptoms : denies chest pain, palpitations, dyspnea, dizziness or  SEALS,   Dental: Patient denies Loose teeth/ +Denture.   ESRD on HD since 2022 on M/W/F ( via left forearm AV fistula ),   CAPRINI SCORE [CLOT]    AGE RELATED RISK FACTORS                                                       MOBILITY RELATED FACTORS  [ ] Age 41-60 years                                            (1 Point)                  [ ] Bed rest                                                        (1 Point)  [X ] Age: 61-74 years                                           (2 Points)                 [ ] Plaster cast                                                   (2 Points)  [ ] Age= 75 years                                              (3 Points)                 [ ] Bed bound for more than 72 hours                 (2 Points)    DISEASE RELATED RISK FACTORS                                               GENDER SPECIFIC FACTORS  [ ] Edema in the lower extremities                       (1 Point)                  [ ] Pregnancy                                                     (1 Point)  [ ] Varicose veins                                               (1 Point)                  [ ] Post-partum < 6 weeks                                   (1 Point)             [X ] BMI > 25 Kg/m2                                            (1 Point)                  [ ] Hormonal therapy  or oral contraception          (1 Point)                 [ ] Sepsis (in the previous month)                        (1 Point)                  [ ] History of pregnancy complications                 (1 point)  [ ] Pneumonia or serious lung disease                                               [ ] Unexplained or recurrent                     (1 Point)           (in the previous month)                               (1 Point)  [ X] Abnormal pulmonary function test                     (1 Point)                 SURGERY RELATED RISK FACTORS  [ ] Acute myocardial infarction                              (1 Point)                 [ ]  Section                                             (1 Point)  [ ] Congestive heart failure (in the previous month)  (1 Point)               [ ] Minor surgery                                                  (1 Point)   [ ] Inflammatory bowel disease                             (1 Point)                 [ ] Arthroscopic surgery                                        (2 Points)  [ ] Central venous access                                      (2 Points)                [X ] General surgery lasting more than 45 minutes   (2 Points)       [ ] Stroke (in the previous month)                          (5 Points)               [ ] Elective arthroplasty                                         (5 Points)                                                                                                                                               HEMATOLOGY RELATED FACTORS                                                 TRAUMA RELATED RISK FACTORS  [ ] Prior episodes of VTE                                     (3 Points)                [ ] Fracture of the hip, pelvis, or leg                       (5 Points)  [ ] Positive family history for VTE                         (3 Points)                 [ ] Acute spinal cord injury (in the previous month)  (5 Points)  [ ] Prothrombin 16740 A                                     (3 Points)                 [ ] Paralysis  (less than 1 month)                             (5 Points)  [ ] Factor V Leiden                                             (3 Points)                  [ ] Multiple Trauma within 1 month                        (5 Points)  [ ] Lupus anticoagulants                                     (3 Points)                                                           [ ] Anticardiolipin antibodies                               (3 Points)                                                       [ ] High homocysteine in the blood                      (3 Points)                                             [ ] Other congenital or acquired thrombophilia      (3 Points)                                                [ ] Heparin induced thrombocytopenia                  (3 Points)                                          Total Score [   6       ]  Caprini Score 0 - 2:  Low Risk, No VTE Prophylaxis required for most patients, encourage ambulation  Caprini Score 3 - 6:  At Risk, pharmacologic VTE prophylaxis is indicated for most patients (in the absence of a contraindication)  Caprini Score Greater than or = 7:  High Risk, pharmacologic VTE prophylaxis is indicated for most patients (in the absence of a contraindication)

## 2024-03-14 NOTE — H&P PST ADULT - ATTENDING COMMENTS
68M with ESRD on HD for LDRT from altruistic donor  Details of surgery discussed including risks of graft nonfunction, delayed function requiring dialysis, bleeding, infection, anastomotic leak, and cardiopulmonary complications.   Patient examined and has palpable femoral and pedal pulses b/l.    Patient had positive covid test last week, but has no symptoms. He states he had covid around 8 weeks ago. discussed with transplant ID and ok to proceed with transplant.    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 Portal pressures measured last week showed gradient of 8mmHg and he was cleared by hepatology.    UNOS [id number]  Donor 31yo altruistic donor  Donor ABO A  Recipient ABO A  Donor CMV -, EBV +  Recipient CMV -, EBV +  Crossmatch negative  Induction: Simulect and Solumedrol

## 2024-03-14 NOTE — HISTORY OF PRESENT ILLNESS
[Diabetes] : diabetes [Neuropathy] : neuropathy [Not Working] : Not working [Other: ___] : [unfilled] [Previous Kidney Transplant] : no previous kidney transplant [Cardiac History] : no cardiac history [Blood Transfusion] : no prior blood transfusion [Claudication/Angina] : no claudication/angina [Hx of DVT/Thrombosis/Miscarriage] : no history of dvt/thrombosis/miscarriage [Retinopathy] : no retinopathy [Insulin] : no insulin treatment [TextBox_16] : 6/2022 [TextBox_20] : 1985 [TextBox_30] : 1/4 mile [TextBox_28] : 1992 [TextBox_42] : Kidney biopsy 1989 showed proteinuria related to obesity [de-identified] : 68M with ESRD on HD since 6/22 for consent signing for possible LDRT. Patient had CT 11/22 showing ascites and pericardial effusion. s/p pericardiocentesis. There was concern for possible cirrhosis with ascites, but CT today did not show any ascites. Labs from 7/2023 showed elevated AST/ALT, which has since normalized.  Labs 3/5/24 Hgb A1c 5.4 Hct 31 Plt 139 BUN 60 Cr 9.55 AST 22 ALT 19  PMH: ESRD on HD, DM, HLD (no longer on meds) PSH: thyroidectomy, LUE AVF, lap band 2010 (lost 150lbs) - deflated, Left 3rd toe tip amputation; pericardiocentesis  Meds: Prandin prn, Midodrine on HD days; Metoprolol, MVI, Repaglinide, Sevelamer, Levothyroxine, copper, biotin, Folic acid, sodium bicarbonate, Trilegy Elipta, Flonase Allergies: ASA, metformin, vasotec, novocaine Social: Lives with wife no children FMH: Mother and sister had kidney transplants Aunt had breast CA  ROS: Cardiac - no MI, CHF, CAD Pulmonary- no h/o COPD, Asthma, or pneumonia Infections- no h/o TB, HIV, Hepatitis. +vaccinated for covid.  Heme- no h/o malignancy or bleeding disorders. No DVT/PE Endo- Diabetes, No longer on medication, + neuropathy. Had thyroidectomy for benign nodule Neuro- no h/o CVA or seizures.  Sensitization events- no previous blood transfusions or previous transplant No infections or hospitalizations in the last 6 months  - No UTI or Kidney stones. Makes normal amount of urine GI -  No melena or blood per rectum

## 2024-03-14 NOTE — ASSESSMENT
[FreeTextEntry1] : 68M with ESRD on HD for LDRT from altruistic donor tentatively scheduled for 3/25/24. Details of surgery and recovery discussed, including risks of graft nonfunction, delayed function, rejection, bleeding, infection, anastomotic leak, and possibility of having stent or drain. Patient also at risk of having this transplant kidney affected by same disease process that caused kidney failure.   Patient to see hepatology today. CT reviewed and there is no ascites, LFT's now normal

## 2024-03-14 NOTE — PHYSICAL EXAM
[General Appearance - Alert] : alert [General Appearance - In No Acute Distress] : in no acute distress [General Appearance - Well Developed] : well developed [General Appearance - Well Nourished] : well nourished [Sclera] : the sclera and conjunctiva were normal [Extraocular Movements] : extraocular movements were intact [Respiration, Rhythm And Depth] : normal respiratory rhythm and effort [Exaggerated Use Of Accessory Muscles For Inspiration] : no accessory muscle use [Heart Rate And Rhythm] : heart rate was normal and rhythm regular [Auscultation Breath Sounds / Voice Sounds] : lungs were clear to auscultation bilaterally [Heart Sounds] : normal S1 and S2 [Bowel Sounds] : normal bowel sounds [Abdomen Soft] : soft [Abdomen Tenderness] : non-tender [No Spinal Tenderness] : no spinal tenderness [No CVA Tenderness] : no ~M costovertebral angle tenderness [Musculoskeletal - Swelling] : no joint swelling seen [Abnormal Walk] : normal gait [Skin Color & Pigmentation] : normal skin color and pigmentation [Motor Tone] : muscle strength and tone were normal [] : no rash [Skin Turgor] : normal skin turgor [No Focal Deficits] : no focal deficits [Oriented To Time, Place, And Person] : oriented to person, place, and time [Affect] : the affect was normal [Impaired Insight] : insight and judgment were intact [Mood] : the mood was normal

## 2024-03-14 NOTE — REVIEW OF SYSTEMS
[Sclera anicteric] : sclera anicteric [Wears glasses] : wears glasses [Fever] : no fever [Chest Pain] : no chest pain [SOB] : no shortness of breath [Abdominal Pain] : no abdominal pain [Dysuria] : no dysuria [Hematuria] : no hematuria

## 2024-03-14 NOTE — PHYSICAL EXAM
[No Acute Distress] : no acute distress [Sclera Anicteric] : sclera anicteric [Clear to Auscultation] : clear to auscultation [Breathing Comfortably on RA] : breathing comfortably on room air [Normal Rate] : normal rate [Soft] : soft [Regular Rhythm] : regular rhythm [Non-tender] : non-tender [In Left Forearm] : fistula/graft in left forearm [] : right dorsalis pedis palpable [Normal] : normal [FreeTextEntry1] : upper and lower partial dentures [TextBox_34] : No edema or ulcers b/l LE [TextBox_86] : No inguinal lymphadenopathy

## 2024-03-14 NOTE — REASON FOR VISIT
[Follow-Up] : a follow-up visit for [End-Stage Renal Disease] : end-stage renal disease [Spouse] : spouse [FreeTextEntry2] : Dr Sukhi Patten

## 2024-03-14 NOTE — H&P PST ADULT - HISTORY OF PRESENT ILLNESS
68 year old male with  ESRD on HD since 06/2022 on M/W/F ( via left forearm AV fistula ). Presents for scheduled Open living kidney transplant recipient to the right side, possible left on 03/25/24.      PMH of obesity , proteinuria related to obesity, kidney failure diagnosed in 1981, DM2 since 1992, ESRD on HD since 06/2022 on M/W/F ( via left forearm AV fistula ), neuropathy, scoliosis, KRYSTIAN prior to weight loss from  lab band insertion in 2010(currently in deflated position since foot surgery in 2019) & Patient had CT in 11/22 with ascites and pericardial effusion &  s/p pericardiocentesis, follow up  Labs from 7/2023 showed elevated AST/ALT on GI follow up. Cause of Kidney Failure: Obesity

## 2024-03-14 NOTE — H&P PST ADULT - NSICDXPASTSURGICALHX_GEN_ALL_CORE_FT
PAST SURGICAL HISTORY:  H/O laparoscopic adjustable gastric banding ' 2010    History of amputation of toe     History of foreign body aspiration 7/2018  from Left Ear    S/P cataract extraction '08 and ' 2011   Bilateral    S/P partial thyroidectomy '95  benign

## 2024-03-14 NOTE — H&P PST ADULT - GENERAL COMMENTS
Internal Medicine History & Physical    Chief Complaint:   Chief Complaint   Patient presents with   • Weakness     Patient was seen on behalf of Dr. Ward    HPI: Pino Sargent is a 73 year old year old male who follows with Michael D D'Amico, MD in the community.  Past medical history significant for coronary artery disease, status post coronary artery bypass grafting, along with obstructive airway disease and mechanical aortic valve replacement with Dr. Asif on 09/28/2022 with left internal thoracic artery to the LAD and SVG graft to the first marginal.  He also has a history of peripheral vascular disease and has undergone lower extremity angiograms which have been unremarkable.  He has a history of a left inguinal hernia for which he underwent herniorrhaphy in 2014. The patient has a remote history of Hodgkin's lymphoma in 1982 and had been treated with 35 fractions of radiation to the neck and tonsil at that point.     The patient was recently admitted to the hospital 10/24/22 for elective robotic repair of incarcerated incisional hernia with bilateral posterior rectus sheath release and placement of Parietene mesh.     The patient did well postoperatively and was discharged home on 10/26/22.  He was discharged on Lovenox bridging as outpatient.     Patient was having some issues with constipation at home and had been taking Miralax which was not helping. He then began taking increased doses of Miralax and also took a dulcolax yesterday evening. Afterwards, he had a large hard stool followed by several loose stools. He denies any blood in his stools or dark colored stools. He has been feeling weak and dizzy over the last several days and noticed that these symptoms were increased after having a bowel movement. This morning he had another large, loose stool and again felt lightheaded and weak so he called the ambulance and was brought to the emergency department. He was initially hypotensive which improved  with IVF bolus. He denies any fever, chills, chest pain, or worsening shortness of breath at home. He does report very little PO intake since Friday afternoon due to feeling full and nauseated, likely due to the constipation.     CT was done in the ED showing a large rectus sheath hematoma extending along the anterior abdominal wall,  measuring up to 35 cm. WBC 15.8, Hgb 8.4 down from 10.7 at the time he was discharged. INR 1.2. Creatinine is also elevated at 2.60. He is currently hemodynamically stable and afebrile. Surgery was consulted from the ED and patient is now admitted to the hospital for further workup and treatment.         Past Medical History:   Past Medical History:   Diagnosis Date   • Aortic valve replaced 2012    19 mm [facility] Artem mechanical   • Arthritis     ALL OVER   • Atrial fibrillation (CMS/HCC)    • BPH (benign prostatic hyperplasia)    • CAD (coronary artery disease) 2012    2 vessel CABG   • Cardiomyopathy (CMS/[facility])    • Cataracts, bilateral    • Cervical myelopathy (CMS/[facility]) 07/10/2015   • Chronic diastolic heart failure (CMS/[facility])    • Claudication (CMS/[facility])     \"both legs get heavy feeling and tired/ cramping mainly  in right leg\"   • Colon polyp 03/11/2022    dr ames, 6 tubular adenomas. recall 3 years   • COPD (chronic obstructive pulmonary disease) (CMS/[facility])     no oxygen   • Difficulty swallowing solids 07/02/2017    coming for EGD   • MONTELONGO (dyspnea on exertion)    • Dyslipidemia    • ED (erectile dysfunction)    • Esophagitis 12/2014   • Esophagitis, reflux 03/11/2022    Dr. Ames   • Essential (primary) hypertension    • Former smoker, stopped smoking in distant past    • Gastritis 03/11/2022    Dr. Ames   • Gastroesophageal reflux disease    • Gout    • Hallux limitus of right foot    • Hemoptysis    • High cholesterol    • History of aortic valve stenosis    • Hodgkin's disease in remission (CMS/[facility]) 1982    HODGKINS (35 Radiation treatments)   • Hypersomnia    • Hypothyroidism      hypothyroidism   • Incisional hernia, incarcerated    • Intervertebral cervical disc disorder with myelopathy, cervical region    • Long term (current) use of anticoagulants    • Mitral valve insufficiency    • Non-rheumatic mitral regurgitation    • Non-rheumatic tricuspid valve insufficiency    • Pacemaker 07/00/2008    st.marry single chamber ([place]); left chest   • Plantar fasciitis     right foot   • Pulmonary emphysema (CMS/HCC)    • Pulmonary hypertension (CMS/HCC)    • Sinus node dysfunction (CMS/HCC)    • Sleep apnea     BiPAP at night   • SOB (shortness of breath) on exertion     has inhaler prn   • Wears glasses 2018     Past Surgical History:   Procedure Laterality Date   • Anes staging celiotomy-hodgkin's disease      RADIATION TREATMENT   • Cardiac catherization  09/2012    RCA total occlusion, LAD 80%--> CABG   • Cardiac catherization  02/23/2017    grafts patent   • Cardiac surgery  2012    2 bypasses, Aortic Valve Replacement   • Cataract extraction, bilateral Bilateral 09/2022   • Colonoscopy w/ polypectomy  03/11/2022    dr ames   • Echo heart resting  09/20/2012    LVEF 64%   • Echo heart resting  10/25/2013    LVEF 60%   • Echo heart resting  11/04/2014    LVEF 59%   • Echo nargis  09/28/2012    No PFO. Aorta is normal caliber. There is trivial tricuspid regrugitaion. There is trivial pulmonic insufficiency. There is no mass in the left atrial appendage. Right ventricle is moderately dilated. The right and left ventricular systolic function is within normal limits. Right and left atrium are increased in size.   • Echo nargis  04/26/2018   • Echocardiogram  04/13/2009    LVEF 68%, moderate bi-atrial enlargement, mild aortic stenosis   • Echocardiogram  03/27/2010    LVEF 72%, moderate bi-atrial enlargement, mild aortic stenosis, moderatetricuspid insufficiency   • Echocardiogram  05/22/2012    LVEF 35-40%, mild to moderate LV systolic dysfunction with regional wall motion abnormality as  above. Moderate mitral regurgitation. Moderate to severe calcific aortic stenosis   • Ep pacer  2008    St Artem -single chamber   • Esophageal motility study     • Esophagogastroduodenoscopy  2017    dr ames   • Esophagogastroduodenoscopy transoral flex w/bx single or mult  2022    dr ames   • Extracapsular cataract removal w insert io lens prosth wo ecp Bilateral    • Fl myelogram 2 + regions spine  2015    Cervical & Lumbar spine   • Inguinal hernia repair Bilateral     right and left inguinal, separate surgeries 1 year apart   • Inguinal hernia repair Left    • Nm myocardial perfusion rest or stress multi  10/25/2013    Positive for ST depresstion Asymptomatic   • Pacer generator change  2017   • Right heart cath  2018   • Service to gastroenterology      colonoscopy   • Splenectomy, total  1982    as part of the hodgkins's treatment   • Tonsillectomy and adenoidectomy  age 32       Review of patient's family status indicates:    Mother                                       87    Father                                       62    Sister                         Alive                     Son                            Alive                     Daughter                       Alive                     Son                            Alive                       Social History     Socioeconomic History   • Marital status: /Civil Union     Spouse name: Jessica   • Number of children: 3   • Years of education: Not on file   • Highest education level: Not on file   Occupational History   • Occupation: retired      Employer: [employer]     Comment: 2017   Tobacco Use   • Smoking status: Former Smoker     Packs/day: 2.00     Years: 35.00     Pack years: 70.00     Types: Cigarettes     Quit date: 2002     Years since quittin.4   • Smokeless tobacco: Never Used   Vaping Use   • Vaping Use: never used   Substance and Sexual Activity   •  Alcohol use: Yes     Alcohol/week: 0.0 standard drinks     Comment: Occasionally    • Drug use: No   • Sexual activity: Yes     Partners: Female     Comment:    Other Topics Concern   •  Service Not Asked   • Blood Transfusions Not Asked   • Caffeine Concern Yes   • Occupational Exposure Not Asked   • Hobby Hazards Not Asked   • Sleep Concern Not Asked   • Stress Concern Not Asked   • Weight Concern Not Asked   • Special Diet Not Asked   • Back Care Not Asked   • Exercise Not Asked   • Bike Helmet Not Asked   • Seat Belt Not Asked   • Self-Exams Not Asked   Social History Narrative    Lives with spouse, 1 cat     Social Determinants of Health     Financial Resource Strain: Not on file   Food Insecurity: Not on file   Transportation Needs: Not on file   Physical Activity: Not on file   Stress: Not on file   Social Connections: Not on file   Intimate Partner Violence: Not At Risk   • Social Determinants: Intimate Partner Violence Past Fear: No   • Social Determinants: Intimate Partner Violence Current Fear: No     Current Facility-Administered Medications   Medication Dose Route Frequency Provider Last Rate Last Admin   • oxyCODONE (IMM REL) (ROXICODONE) tablet 5 mg  5 mg Oral Q4H ANAMRAIAN STONEY Day   5 mg at 10/30/22 1612     Current Outpatient Medications   Medication Sig Dispense Refill   • oxyCODONE, IMM REL, (ROXICODONE) 5 MG immediate release tablet Take 1 tablet by mouth every 4 hours as needed for Pain. 20 tablet 0   • enoxaparin (Lovenox) 100 MG/ML injectable solution Inject 0.9 mLs into the skin every 12 hours. continue until INR therapeutic     • rosuvastatin (CRESTOR) 5 MG tablet TAKE ONE TABLET BY MOUTH DAILY 90 tablet 3   • allopurinol (ZYLOPRIM) 100 MG tablet Take 1 tablet by mouth in the morning and 1 tablet in the evening. 180 tablet 3   • baclofen (LIORESAL) 10 MG tablet Take 1 tablet by mouth nightly. 90 tablet 3   • levothyroxine 100 MCG tablet Take 1 tablet by mouth  daily. 90 tablet 3   • sildenafil (VIAGRA) 100 MG tablet TAKE ONE TABLET BY MOUTH DAILY AS NEEDED FOR ERECTILE DYSFUNCTION 30 tablet 0   • torsemide (DEMADEX) 20 MG tablet Take 4 tablets by mouth daily. 360 tablet 2   • gabapentin (NEURONTIN) 300 MG capsule TAKE ONE CAPSULE BY MOUTH THREE TIMES A  capsule 1   • dilTIAZem (CARDIZEM CD) 180 MG 24 hr capsule Take 2 capsules by mouth daily. 180 capsule 3   • potassium CHLORIDE (Klor-Con M) 20 MEQ betsy ER tablet Take 2 tablets by mouth daily. 180 tablet 3   • Polyethyl Glycol-Propyl Glycol (SYSTANE OP) Apply 1 drop to eye daily as needed (as needed for dry eyes).     • rabeprazole (ACIPHEX) 20 MG tablet Take 1 tablet by mouth 2 times daily (before meals). 180 tablet 5   • triamcinolone (NASACORT) 55 MCG/ACT nasal inhaler SPRAY 1 SPRAY IN EACH NOSTRIL 2 TIMES DAILY 16.9 each 6   • warfarin (COUMADIN) 4 MG tablet Take 1 tablet by mouth 3 days a week. (Patient taking differently: Take 4 mg by mouth 3 days a week. On Monday, Wednesday, and Friday) 90 tablet 3   • warfarin (COUMADIN) 3 MG tablet Take 1 tablet by mouth daily. Takes 3-4 mg alternating daily , 4 days in rhe week (Patient taking differently: as directed. Take 3mg tablet by mouth on Sunday, Tuesday, Thursday and Saturday (take 4mg MWF)) 90 tablet 3   • Nutritional Supplements (VITAMIN D BOOSTER PO) Take 1 tablet by mouth daily.      • Ascorbic Acid (VITAMIN C) 1000 MG tablet Take 1,000 mg by mouth daily.      • aspirin 81 MG chewable tablet Chew 1 tablet by mouth daily. 30 tablet 2   • acetaminophen (TYLENOL) 650 MG CR tablet Take 650 mg by mouth every 8 hours as needed.           ROS:   Eye Problem(s):negative  ENT Problem(s):negative  Cardiovascular problem(s):negative  Respiratory problem(s):negative  Gastro-intestinal problem(s):abdominal pain, constipation, diarrhea and poor appetite  Genito-urinary problem(s):negative  Musculoskeletal problem(s):negative  Integumentary  problem(s):negative  Neurological problem(s):negative  Psychiatric problem(s):negative  Endocrine problem(s):hypothyroidism  Hematologic and/or Lymphatic problem(s):anemia      Physical Exam:  No intake or output data in the 24 hours ending 10/30/22 1622    Visit Vitals  /62 (BP Location: RUE - Right upper extremity, Patient Position: Sitting)   Pulse 95   Temp 97.4 °F (36.3 °C) (Oral)   Resp 18   Wt 93.9 kg (207 lb 0.2 oz)   SpO2 91%   BMI 27.31 kg/m²     General : Awake. No acute distress.   HEENT : Head is normocephalic, atraumatic. CINDI. Oral mucosa is moist. No scleral icterus  Neck : Supple, No JVD, No LAD.    Lungs : Clear to auscultation bilaterally. No wheezes, crackles, or rhonchi.   Heart : Regular rate and rhythm. No murmurs, gallops, or rubs. No edema in bilateral lower extremities  Abdomen : firm, distended, hypoactive bowel sounds, + tender     Musculoskeletal : Normal ROM in bilateral shoulder, elbow, wrist, hip, and knee joints.  Skin : No lesions, rashes, or ulcers. No erythema or cyanosis.  Neurological : Cranial nerves 2-12 are grossly non focal. Muscle strength is 5/5 in all extremities.  Lymphatics: No cervical, axillary or inguinal adenopathy  Psychiatric : Alert and oriented X 3. Normal affect      Laboratory Data:  Recent Labs   Lab 10/30/22  1117 10/30/22  1044 10/26/22  1245 10/26/22  0458 10/25/22  0554   WBC  --  15.8*  --  9.9 13.1*   HCT  --  27.0*  --  33.3* 37.4*   HGB  --  8.4*  --  10.7* 12.0*   PLT  --  347  --  260 288   INR  --  1.2  --   --   --    PTT  --  28  --   --   --    SODIUM  --  138 136 138 137   POTASSIUM  --  3.9 3.8 4.0 4.4   CHLORIDE  --  101 102 103 107   CO2  --  23 32 32 27   CALCIUM  --  8.7 8.9 8.6 9.0   GLUCOSE  --  192* 130* 128* 153*   BUN  --  37* 30* 29* 20   CREATININE 2.60* 2.34* 1.41* 1.43* 1.07   AST  --  22  --   --   --    GPT  --  30  --   --   --    ALKPT  --  141*  --   --   --    BILIRUBIN  --  2.0*  --   --   --    ALBUMIN  --  3.1*  --    --   --    LIPA  --  125  --   --   --           Imaging:  Reviewed    Assessment:    1. Acute rectus sheath hematoma  2. Acute blood loss anemia secondary to above  3. Constipation   4. Status post robotic repair of incarcerated incisional hernia with bilateral posterior rectus sheath release and placement of Parietene mesh 10/24/22  5. Long-term anticoagulation on Warfarin, history of mechanical aortic valve 09/20/22  6. Acute on chronic kidney disease stage III  7. Chronic obstructive pulmonary disease  8. History of coronary artery disease, status post coronary artery bypass grafting with left internal thoracic artery to the left anterior descending and saphenous vein graft to the first marginal in 2012  9. History of Hodgkin's lymphoma      Plan:    Admit to inpatient status with at least 2 MN stay expected   Surgery consulted - no immediate plans for surgery  Hold anticoagulation  Monitor CBC and INR  IVF for now  Monitor renal function  Aggressive bowel regimen  Judicious pain control  Continue statin, cardizem, torsemide  PT/OT  Tele monitoring  PPI for GI, SCDs for DVT  Code status: FULL        I have discussed this case with Dr. Castillo.  Please see attached note.      Thank you for involving us in the care of Pino Sargent.  We will continue to follow along with you.  Please do not hesitate to contact us with any questions or concerns.      Please copy to Michael D D'Amico, MD.    STONEY Garcia  10/30/2022  4:22 PM  I saw and evaluated the patient personally. I performed the history, exam and medical decision making & reviewed all radiology/cardiac & lab data for this encounter and agree with the above note & updated     lost 150 pounds from lap band surgery in 2010, now lap band in deflated position since 2019 for foot surgery

## 2024-03-14 NOTE — H&P PST ADULT - NSICDXPROCEDURE_GEN_ALL_CORE_FT
PROCEDURES:  Transplant, kidney, living donor, open 14-Mar-2024 15:27:17 Open living kidney transplant recipient to the right isde, possible left on 03/25/24. Jeffrey Lepe

## 2024-03-14 NOTE — H&P PST ADULT - PROBLEM SELECTOR PLAN 1
Open living kidney transplant recipient to the right side, possible left on 03/25/24.  PREOP t/s SENT , LAST AIC 5.4 done on 03/04/23 pt has reports shown  preop cardiology eval, pending echo/St on 3/20/24.

## 2024-03-15 ENCOUNTER — APPOINTMENT (OUTPATIENT)
Dept: HEPATOLOGY | Facility: CLINIC | Age: 69
End: 2024-03-15
Payer: MEDICARE

## 2024-03-15 DIAGNOSIS — Z01.818 ENCOUNTER FOR OTHER PREPROCEDURAL EXAMINATION: ICD-10-CM

## 2024-03-15 LAB
ABO + RH PNL BLD: NORMAL
ALBUMIN SERPL ELPH-MCNC: 4.3 G/DL
ALP BLD-CCNC: 99 U/L
ALT SERPL-CCNC: 18 U/L
ANION GAP SERPL CALC-SCNC: 15 MMOL/L
APTT BLD: 28.1 SEC
AST SERPL-CCNC: 24 U/L
BILIRUB SERPL-MCNC: 0.3 MG/DL
BUN SERPL-MCNC: 38 MG/DL
CALCIUM SERPL-MCNC: 9.5 MG/DL
CHLORIDE SERPL-SCNC: 97 MMOL/L
CO2 SERPL-SCNC: 25 MMOL/L
CREAT SERPL-MCNC: 6.42 MG/DL
EGFR: 9 ML/MIN/1.73M2
GLUCOSE SERPL-MCNC: 103 MG/DL
HBV CORE IGM SER QL: NONREACTIVE
HBV E AB SER QL: NONREACTIVE
HBV E AG SER QL: NONREACTIVE
HBV SURFACE AB SERPL IA-ACNC: <3 MIU/ML
HCT VFR BLD CALC: 34.9 %
HCV RNA FLD QL NAA+PROBE: NORMAL
HCV RNA SPEC QL PROBE+SIG AMP: NOT DETECTED
HGB BLD-MCNC: 11.2 G/DL
INR PPP: 0.94 RATIO
MCHC RBC-ENTMCNC: 32.1 GM/DL
MCHC RBC-ENTMCNC: 34.5 PG
MCV RBC AUTO: 107.4 FL
PLATELET # BLD AUTO: 147 K/UL
POTASSIUM SERPL-SCNC: 5.5 MMOL/L
PROT SERPL-MCNC: 7.1 G/DL
PT BLD: 10.6 SEC
RBC # BLD: 3.25 M/UL
RBC # FLD: 14.5 %
SODIUM SERPL-SCNC: 136 MMOL/L
WBC # FLD AUTO: 8.89 K/UL

## 2024-03-15 PROCEDURE — 76981 USE PARENCHYMA: CPT | Mod: 26

## 2024-03-15 NOTE — HISTORY OF PRESENT ILLNESS
[FreeTextEntry1] : Mr. Cameron is a 69y/o gentleman with a medical history of ESRD on HD (since 2022) who is planned for living donor renal transplant in two weeks and here for liver evaluation.  He apparently had been evaluated previously for liver disease a few years ago during his initial workup but recent Ct abdomen from late last year showed ascites and cirrhotic appearing liver for which I have asked to see him.  He denies any prior known liver disease as far as he knows. Denies any family history of liver disease. Very rarely drinks alcohol. Quit smoking >30 years ago. Had lap band surgery in 2010 and lost about 150lbs afterwards and has mostly maintained his weight.   Denies any liver related complaints today. No jaundice and never in the past.  Repeat Ct abdomen today shows no ascites and relatively normal liver morphology with normal spleen size as read by me.  Recent labs (provided in paper form by patient) shows normal liver enzymes, normal bilirubin level, Low Plt of 140. Normal albumin.

## 2024-03-15 NOTE — REASON FOR VISIT
[Consultation] : a consultation visit [Spouse] : spouse [FreeTextEntry1] : Pre renal transplant liver evaluation

## 2024-03-15 NOTE — ASSESSMENT
[FreeTextEntry1] : Mr. Cameron is a 67y/o gentleman with a medical history of ESRD on HD (since 2022) who is planned for living donor renal transplant in two weeks and here for liver evaluation.  Apparently evaluated previously for liver disease a few years ago during his initial workup but recent Ct abdomen from late last year showed ascites and cirrhotic appearing liver for which I have been asked to see him. Repeat Ct abdomen today shows no ascites and relatively normal liver morphology with normal spleen size as read by me. Will f/u official report. Recent labs (provided in paper form by patient) shows normal liver enzymes, normal bilirubin level, Low Plt of 140. Normal albumin.  Liver serologies ordered and Fibroscan to evaluate for fibrosis and steatosis. Prior ascites presumed due to renal disease - improved with proper HD sessions.  TTE reviewed from 12/23 - normal RV and LV but with large pericardial effusion concerning for ?tamponade. Plan for repeat soon. Will call with labs and fibroscan results and will discuss with Renal transplant team.

## 2024-03-18 ENCOUNTER — RESULT REVIEW (OUTPATIENT)
Age: 69
End: 2024-03-18

## 2024-03-18 ENCOUNTER — APPOINTMENT (OUTPATIENT)
Dept: CV DIAGNOSTICS | Facility: HOSPITAL | Age: 69
End: 2024-03-18

## 2024-03-18 ENCOUNTER — OUTPATIENT (OUTPATIENT)
Dept: OUTPATIENT SERVICES | Facility: HOSPITAL | Age: 69
LOS: 1 days | End: 2024-03-18
Payer: COMMERCIAL

## 2024-03-18 DIAGNOSIS — Z87.898 PERSONAL HISTORY OF OTHER SPECIFIED CONDITIONS: Chronic | ICD-10-CM

## 2024-03-18 DIAGNOSIS — Z01.818 ENCOUNTER FOR OTHER PREPROCEDURAL EXAMINATION: ICD-10-CM

## 2024-03-18 DIAGNOSIS — Z89.429 ACQUIRED ABSENCE OF OTHER TOE(S), UNSPECIFIED SIDE: Chronic | ICD-10-CM

## 2024-03-18 DIAGNOSIS — Z98.890 OTHER SPECIFIED POSTPROCEDURAL STATES: Chronic | ICD-10-CM

## 2024-03-18 DIAGNOSIS — Z98.84 BARIATRIC SURGERY STATUS: Chronic | ICD-10-CM

## 2024-03-18 DIAGNOSIS — Z98.49 CATARACT EXTRACTION STATUS, UNSPECIFIED EYE: Chronic | ICD-10-CM

## 2024-03-18 LAB
FERRITIN SERPL-MCNC: 901 NG/ML
HAV IGM SER QL: NONREACTIVE
HCV AB SER QL: NONREACTIVE
HCV S/CO RATIO: 0.07 S/CO
HEPATITIS A IGG ANTIBODY: NONREACTIVE
IRON SATN MFR SERPL: 59 %
IRON SERPL-MCNC: 136 UG/DL
TIBC SERPL-MCNC: 232 UG/DL
UIBC SERPL-MCNC: 96 UG/DL

## 2024-03-18 PROCEDURE — 78451 HT MUSCLE IMAGE SPECT SING: CPT | Mod: 26,MC

## 2024-03-18 PROCEDURE — 93016 CV STRESS TEST SUPVJ ONLY: CPT | Mod: GC,MC

## 2024-03-18 PROCEDURE — 93018 CV STRESS TEST I&R ONLY: CPT | Mod: GC,MC

## 2024-03-19 ENCOUNTER — APPOINTMENT (OUTPATIENT)
Dept: INTERVENTIONAL RADIOLOGY/VASCULAR | Facility: CLINIC | Age: 69
End: 2024-03-19
Payer: COMMERCIAL

## 2024-03-19 ENCOUNTER — NON-APPOINTMENT (OUTPATIENT)
Age: 69
End: 2024-03-19

## 2024-03-19 DIAGNOSIS — N18.6 END STAGE RENAL DISEASE: ICD-10-CM

## 2024-03-19 DIAGNOSIS — Z99.2 END STAGE RENAL DISEASE: ICD-10-CM

## 2024-03-19 DIAGNOSIS — K74.00 HEPATIC FIBROSIS, UNSPECIFIED: ICD-10-CM

## 2024-03-19 PROCEDURE — 99203 OFFICE O/P NEW LOW 30 MIN: CPT

## 2024-03-19 RX ORDER — OXYCODONE 10 MG/1
10 TABLET ORAL
Qty: 6 | Refills: 0 | Status: DISCONTINUED | COMMUNITY
Start: 2022-05-31 | End: 2024-03-19

## 2024-03-19 NOTE — ASSESSMENT
[Other: _____] : [unfilled] [FreeTextEntry1] : This is a 67 y/o male with hx of HTN, asthma, solitary pericardial effusion s/p pericardiocentesis in 12/2022, ESRD on HD (m/w/f via LUE AVF) since 2022 with liver fibrosis, who presents to IR for transjugular liver biopsy with portal pressure measurements consultation for further hepatic evaluation prior to planned LDRT.  # Liver Fibrosis - pt with hx of ESRD on HD planned for LDRT on 3/25 found to have with liver fibrosis on 3/14/24 CT during pre transplant w/u - 3/14/24 CT demonstrated cirrhotic morphology   - referred by Dr Waller for outpatient transjugular liver biopsy with portal pressure measurements - procedural benefits, alternatives, risks (bleeding & infection etc) & expected postprocedural course were discussed at great length - procedure will be performed by [facility] IR attending - performed with sedation from the anesthesia team - biopsy results typically take 3-5 business days or longer if special testing is performed & results will be sent to your referring Dr who will review them with you - specimen to be sent urgent per Dr Evangelista's request - Labs from 3/13/24 - cbc, cmp, pt / inr, ptt - have been reviewed  - pt states HD is at 6 am m/w/f & is home by 10 - 10;30 am - will coordinate procedure with HD schedule - reviewed that he is to be driven home by a y member or friend postprocedure   # T2DM   - A1C 5.3 on 6/8/23  - reports dietary management - instructed he can take usual dose the day before procedure but is to hold it pre op dos - pt states he has not taken Prandin 0.5 mg with meals for over 2 years given FBS has been < 170  - FBS pre op dos  # HTN - states well controlled with current antihypertensive(s) regimen - instructed to take antihypertensives pre op dos - pre transplant screening echo from 1/2024 reviewed - wnl  # Asthma  - denies recent exacerbation - states well controlled with current regimen - managed by Dr Marciano Amador - instructed to bring inhaler(s) dos - airway evaluation & management as per Anesthesia  Mr. WILSON's comprehension was confirmed & all questions were asked and answered to his her satisfaction.  The pt would like to move forward with the proposed biopsy.  IR contact information was provided to the pt should there be additional questions or concerns.  The pt was informed that our booking office will contact him to schedule the biopsy procedure.  Above case & plan discussed with IR attendings

## 2024-03-19 NOTE — PHYSICAL EXAM
[Alert] : alert [Well Nourished] : well nourished [No Acute Distress] : no acute distress [Well Developed] : well developed [Healthy Appearance] : healthy appearance [Normal Voice/Communication] : normal voice communication [No Respiratory Distress] : no respiratory distress [Oriented x3] : oriented to person, place, and time [Normal Affect] : the affect was normal [Normal Insight/Judgement] : insight and judgment were intact [Recent Memory Normal] : recent memory was not impaired [Normal Mood] : the mood was normal [Remote Memory Normal] : remote memory was not impaired [Fully active, able to carry on all pre-disease performance without restriction] : Fully active, able to carry on all pre-disease performance without restriction

## 2024-03-19 NOTE — ASSESSMENT
[Other: _____] : [unfilled] [FreeTextEntry1] : This is a 67 y/o male with hx of HTN, asthma, solitary pericardial effusion s/p pericardiocentesis in 12/2022, ESRD on HD (m/w/f via LUE AVF) since 2022 with liver fibrosis, who presents to IR for transjugular liver biopsy with portal pressure measurements consultation for further hepatic evaluation prior to planned LDRT.  # Liver Fibrosis - pt with hx of ESRD on HD planned for LDRT on 3/25 found to have with liver fibrosis on 3/14/24 CT during pre transplant w/u - 3/14/24 CT demonstrated cirrhotic morphology   - referred by Dr Waller for outpatient transjugular liver biopsy with portal pressure measurements - procedural benefits, alternatives, risks (bleeding & infection etc) & expected postprocedural course were discussed at great length - procedure will be performed by [facility] IR attending - performed with sedation from the anesthesia team - biopsy results typically take 3-5 business days or longer if special testing is performed & results will be sent to your referring Dr who will review them with you - specimen to be sent urgent per Dr Evangelista's request - Labs from 3/13/24 - cbc, cmp, pt / inr, ptt - have been reviewed  - pt states HD is at 6 am m/w/f & is home by 10 - 10;30 am - will coordinate procedure with HD schedule - reviewed that he is to be driven home by a y member or friend postprocedure   # T2DM   - A1C 5.3 on 6/8/23  - reports dietary management - instructed he can take usual dose the day before procedure but is to hold it pre op dos - pt states he has not taken Prandin 0.5 mg with meals for over 2 years given FBS has been < 170  - FBS pre op dos  # HTN - states well controlled with current antihypertensive(s) regimen - instructed to take antihypertensives pre op dos - pre transplant screening echo from 1/2024 reviewed - wnl  # Asthma  - denies recent exacerbation - states well controlled with current regimen - managed by Dr Marciano Amador - instructed to bring inhaler(s) dos - airway evaluation & management as per Anesthesia  Mr. WILSON's comprehension was confirmed & all questions were asked and answered to his her satisfaction.  The pt would like to move forward with the proposed biopsy.  IR contact information was provided to the pt should there be additional questions or concerns.  The pt was informed that our booking office will contact him to schedule the biopsy procedure.  Above case & plan discussed with IR attendings

## 2024-03-19 NOTE — HISTORY OF PRESENT ILLNESS
[0] : ~His/Her~ pain was 0 out of 10 [FreeTextEntry1] : This is a 69 y/o male with hx of HTN, asthma,pericardial effusion s/p pericardiocentesis in  , ESRD on HD (  )  since 2022 with liver fibrosis, who presents to IR for transjugular liver biopsy with portal pressure measurements consultation for evaluation prior to planned LDRT, as referred by Dr Evangelista.   Mr. WILSON denies abdominal pain, scleral or dermal discoloration, abdominal distention, ascites, LE edema, altered mentation, hematemesis, hemoptysis, hematochezia, melena,  n/v/d, unintentional weight loss or gain or any other liver related sequelae.   Hep Dr Evangelista Neph Xplant Dr Burrell Cardiac Dr Francesco Koroma

## 2024-03-19 NOTE — PHYSICAL EXAM
[Alert] : alert [No Acute Distress] : no acute distress [Well Nourished] : well nourished [Well Developed] : well developed [Healthy Appearance] : healthy appearance [Normal Voice/Communication] : normal voice communication [No Respiratory Distress] : no respiratory distress [Oriented x3] : oriented to person, place, and time [Normal Affect] : the affect was normal [Normal Insight/Judgement] : insight and judgment were intact [Recent Memory Normal] : recent memory was not impaired [Normal Mood] : the mood was normal [Remote Memory Normal] : remote memory was not impaired [Fully active, able to carry on all pre-disease performance without restriction] : Fully active, able to carry on all pre-disease performance without restriction

## 2024-03-19 NOTE — HISTORY OF PRESENT ILLNESS
[Home] : at home, [unfilled] , at the time of the visit. [Medical Office: ([facility])___] : at the medical office located in  [FreeTextEntry1] : Telehealth consult eventually converted to telephone after multiple video connectivity disruption issues on pt's end.  This is a 67 y/o male with hx of T2DM, HTN, asthma, thyroid nodule s/p thyroidectomy, solitary pericardial effusion s/p pericardiocentesis in 12/2022, ESRD on HD (m/w/f via LUE AVF) since 2022 with liver fibrosis, who presents to IR for transjugular liver biopsy with portal pressure measurements consultation for further hepatic evaluation prior to planned LDRT, as referred by Dr Evangelista.   Mr. WILSON denies abdominal pain, scleral or dermal discoloration, abdominal distention, ascites, LE edema, altered mentation, hematemesis, hemoptysis, hematochezia, melena,  n/v/d, unintentional weight loss or gain or any other liver related sequelae.  Reports Denies nsaids or a/c usage.   Hep Dr Evangelista Neph Xplant Dr Burrell Cardiac Dr Francesco Spann Pulm Dr Marciano Amador   [0] : ~His/Her~ pain was 0 out of 10

## 2024-03-19 NOTE — HISTORY OF PRESENT ILLNESS
[Home] : at home, [unfilled] , at the time of the visit. [Medical Office: ([facility])___] : at the medical office located in  [FreeTextEntry1] : Telehealth consult eventually converted to telephone after multiple video connectivity disruption issues on pt's end.  This is a 67 y/o male with hx of T2DM, HTN, asthma, thyroid nodule s/p thyroidectomy, solitary pericardial effusion s/p pericardiocentesis in 12/2022, ESRD on HD (m/w/f via LUE AVF) since 2022 with liver fibrosis, who presents to IR for transjugular liver biopsy with portal pressure measurements consultation for further hepatic evaluation prior to planned LDRT, as referred by Dr Evangelista.   Mr. WILSON denies abdominal pain, scleral or dermal discoloration, abdominal distention, ascites, LE edema, altered mentation, hematemesis, hemoptysis, hematochezia, melena,  n/v/d, unintentional weight loss or gain or any other liver related sequelae.  Reports Denies nsaids or a/c usage.   Hep Dr Evangelista Neph Xplant Dr Burrell Cardiac Dr Francesco Spann Pulm Dr Marciano Amador   [0] : ~His/Her~ pain was 0 out of 10

## 2024-03-19 NOTE — ASSESSMENT
[Other: _____] : [unfilled] [FreeTextEntry1] :   # Liver Fibrosis - pt with hx of  - imaging demonstrated    - referred by Dr Waller for outpatient  - imaging was reviewed a great length with the pt - procedural benefits, alternatives, risks (bleeding & infection) & expected postprocedural course were discussed at great length - lung bxs are associated with a 25% risk of pneumothorax, 5% of the time requires chest tube placement & inpatient admission - may be performed with straight local anesthesia  - performed with sedation from the anesthesia team -  A cytology tech will be present to evaluate bx samples to determine if adequate tissue is present for diagnosis - biopsy results typically take 3-5 business days or longer if special testing is performed & results will be sent to your referring Dr who will review them with you - Labs from  - cbc, cmp, bmp, inr - have been reviewed  - Labs - cbc, bmp, cmp,  inr - are required, have been ordered & are to be resulted prior to dos  - the pt expressed that he she is open to exploring treatment options should bx be positive for malignancy - the pt expressed that he she is not open to exploring treatment options should bx be positive for malignancy  Mr. WILSON's comprehension was confirmed & all questions were asked and answered to his her satisfaction.  The pt would like to move forward with the proposed biopsy. The pt would like to hold off on having a biopsy & will be in touch with our office should he she decide to move forward.   IR contact information was provided to the pt should there be additional questions or concerns.  The pt was informed that our booking office will contact him to schedule the biopsy procedure.

## 2024-03-19 NOTE — REVIEW OF SYSTEMS
[As noted in HPI] : as noted in HPI [As Noted in HPI] : as noted in HPI [Negative] : Heme/Lymph [FreeTextEntry3] : + glasses

## 2024-03-19 NOTE — REASON FOR VISIT
[Consultation] : a consultation visit [FreeTextEntry1] : transjugular liver biopsy with portal pressure measurements

## 2024-03-21 ENCOUNTER — TRANSCRIPTION ENCOUNTER (OUTPATIENT)
Age: 69
End: 2024-03-21

## 2024-03-21 ENCOUNTER — RESULT REVIEW (OUTPATIENT)
Age: 69
End: 2024-03-21

## 2024-03-21 ENCOUNTER — NON-APPOINTMENT (OUTPATIENT)
Age: 69
End: 2024-03-21

## 2024-03-21 ENCOUNTER — OUTPATIENT (OUTPATIENT)
Dept: OUTPATIENT SERVICES | Facility: HOSPITAL | Age: 69
LOS: 1 days | End: 2024-03-21
Payer: COMMERCIAL

## 2024-03-21 ENCOUNTER — OUTPATIENT (OUTPATIENT)
Dept: OUTPATIENT SERVICES | Facility: HOSPITAL | Age: 69
LOS: 1 days | End: 2024-03-21
Payer: MEDICARE

## 2024-03-21 ENCOUNTER — APPOINTMENT (OUTPATIENT)
Dept: CV DIAGNOSITCS | Facility: HOSPITAL | Age: 69
End: 2024-03-21

## 2024-03-21 VITALS
DIASTOLIC BLOOD PRESSURE: 74 MMHG | SYSTOLIC BLOOD PRESSURE: 128 MMHG | HEART RATE: 69 BPM | OXYGEN SATURATION: 100 % | RESPIRATION RATE: 18 BRPM

## 2024-03-21 VITALS
WEIGHT: 202.38 LBS | OXYGEN SATURATION: 99 % | SYSTOLIC BLOOD PRESSURE: 132 MMHG | TEMPERATURE: 98 F | DIASTOLIC BLOOD PRESSURE: 78 MMHG | RESPIRATION RATE: 18 BRPM | HEART RATE: 92 BPM | HEIGHT: 69 IN

## 2024-03-21 DIAGNOSIS — Z98.890 OTHER SPECIFIED POSTPROCEDURAL STATES: Chronic | ICD-10-CM

## 2024-03-21 DIAGNOSIS — Z87.898 PERSONAL HISTORY OF OTHER SPECIFIED CONDITIONS: Chronic | ICD-10-CM

## 2024-03-21 DIAGNOSIS — Z98.49 CATARACT EXTRACTION STATUS, UNSPECIFIED EYE: Chronic | ICD-10-CM

## 2024-03-21 DIAGNOSIS — K74.00 HEPATIC FIBROSIS, UNSPECIFIED: ICD-10-CM

## 2024-03-21 DIAGNOSIS — Z01.818 ENCOUNTER FOR OTHER PREPROCEDURAL EXAMINATION: ICD-10-CM

## 2024-03-21 DIAGNOSIS — Z89.429 ACQUIRED ABSENCE OF OTHER TOE(S), UNSPECIFIED SIDE: Chronic | ICD-10-CM

## 2024-03-21 DIAGNOSIS — Z98.84 BARIATRIC SURGERY STATUS: Chronic | ICD-10-CM

## 2024-03-21 LAB — GLUCOSE BLDC GLUCOMTR-MCNC: 114 MG/DL — HIGH (ref 70–99)

## 2024-03-21 PROCEDURE — 76937 US GUIDE VASCULAR ACCESS: CPT | Mod: 26

## 2024-03-21 PROCEDURE — C1894: CPT

## 2024-03-21 PROCEDURE — 88307 TISSUE EXAM BY PATHOLOGIST: CPT | Mod: 26

## 2024-03-21 PROCEDURE — 37200 TRANSCATHETER BIOPSY: CPT

## 2024-03-21 PROCEDURE — C1887: CPT

## 2024-03-21 PROCEDURE — 76937 US GUIDE VASCULAR ACCESS: CPT

## 2024-03-21 PROCEDURE — C1757: CPT

## 2024-03-21 PROCEDURE — 88313 SPECIAL STAINS GROUP 2: CPT

## 2024-03-21 PROCEDURE — 36011 PLACE CATHETER IN VEIN: CPT

## 2024-03-21 PROCEDURE — 75970 VASCULAR BIOPSY: CPT | Mod: 26

## 2024-03-21 PROCEDURE — 93306 TTE W/DOPPLER COMPLETE: CPT | Mod: 26

## 2024-03-21 PROCEDURE — 75970 VASCULAR BIOPSY: CPT

## 2024-03-21 PROCEDURE — 88307 TISSUE EXAM BY PATHOLOGIST: CPT

## 2024-03-21 PROCEDURE — 88313 SPECIAL STAINS GROUP 2: CPT | Mod: 26

## 2024-03-21 PROCEDURE — C1769: CPT

## 2024-03-21 PROCEDURE — 82962 GLUCOSE BLOOD TEST: CPT

## 2024-03-21 RX ORDER — REPAGLINIDE 1 MG/1
1 TABLET ORAL
Refills: 0 | DISCHARGE

## 2024-03-21 NOTE — H&P ADULT - HISTORY OF PRESENT ILLNESS
Interventional Radiology    HPI: 68y Male with hepatic fibrosis presents for TJLB and pressure measurements prior to kidney transplant.     Review of Systems:   Constitutional: No fever, weight loss, or fatigue  Neurological: No headaches, memory loss, loss of strength, numbness, or tremors  Respiratory: No cough, wheezing, chills or hemoptysis; No shortness of breath  Cardiovascular: No chest pain, palpitations, dizziness, or leg swelling  Gastrointestinal: No abdominal or epigastric pain. No nausea, vomiting, or hematemesis; No diarrhea or constipation. No melena or hematochezia.  Skin: No itching, burning, rashes, or lesions   Musculoskeletal: No joint pain or swelling; No muscle, back, or extremity pain    Allergies: MetFORMIN Hydrochloride (Unknown)  Enalapril Maleate (Unknown)  Novocain (Other)  Vasotec (Unknown)  Enalaprilat (Unknown)  capsicum (Unknown)  aspirin (Unknown)    Medications (Abx/Cardiac/Anticoagulation/Blood Products)      Data:  175.3  91.8  T(C): 36.8  HR: 92  BP: 132/78  RR: 18  SpO2: 99%            Physical Exam  General: No acute distress, nontoxic, A&Ox3  Chest: Non labored breathing  Abdomen: Non-distended, non-tender, no preitoneal signs  Extremities: No swelling, warm,. No pedal edema or calf tenderness notes.  Skin: No rashes or lesions    RADIOLOGY & ADDITIONAL TESTS:    Imaging Reviewed    Plan: 68y Male presents for TJLB and pressure measurements  -Risks/Benefits/alternatives explained with the patient and/or healthcare proxy and witnessed informed consent obtained.

## 2024-03-21 NOTE — ASU DISCHARGE PLAN (ADULT/PEDIATRIC) - ASU DC SPECIAL INSTRUCTIONSFT
Transjugular Biopsy Discharge    Discharge Instructions  - You have had a transjugular liver biopsy  - You may shower in 24 hours. No soaking or swimming until the site is completely healed.  - Keep the area covered and dry for the next 24 hours.  - Do not perform any heavy lifting for the next few days or until the site is healed.  - You may resume your normal diet.  - You may resume your normal medications however you should wait 48 hours before restarting aspirin, plavix, or blood thinners.  - It is normal to experience some pain over the site for the next few days. You may take apply ice to the area (20 minutes on, 20 minutes off) and take Tylenol for that pain. Do not take more frequently than every 6 hours and do not exceed more than 3000mg of Tylenol in a 24 hour period.    - You were given conscious sedation which may make you drowsy, therefore you need someone to stay with you until the morning following the procedure.  - Do not drive, engage in heavy lifting or strenuous activity, or drink any alcoholic beverages for the next 24 hours.   - You may resume normal activity in 24 hours.    Notify your primary physician and/or Interventional Radiology IMMEDIATELY if you experience any of the following       - Fever of 101F or 38C       - Chills or Rigors/ Shakes       - Swelling and/or Redness in the area around the biopsy site       - Worsening Pain       - Blood soaked bandages or worsening bleeding       - Lightheadedness and/or dizziness upon standing       - Chest Pain/ Tightness       - Shortness of Breath       - Difficulty walking    If you have a problem that you believe requires IMMEDIATE attention, please go to your NEAREST Emergency Room. If you believe your problem can safely wait until you speak to a physician, please call Interventional Radiology for any concerns.    During Normal Weekday Business Hours- You can contact the Interventional Radiology department during normal business hours via telephone.  During Evenings and Weekends- If you need to contact Interventional Radiology during off hours, do so by calling the hospital and requesting to be connected to the Interventional Radiologist on call.

## 2024-03-21 NOTE — ASU DISCHARGE PLAN (ADULT/PEDIATRIC) - NS MD DC FALL RISK RISK
For information on Fall & Injury Prevention, visit: [email or web address] OR  [email or web address] OR  https://www.cdc.gov/steadi/patient.html

## 2024-03-21 NOTE — PROCEDURE NOTE - PROCEDURE FINDINGS AND DETAILS
Technically successful transjugular liver biopsy with 3 cores obtained.     Pressure measurements:  RA: -3  Free: 3  Wedged: 11    Final gradient of 8 mmHg

## 2024-03-21 NOTE — ASU DISCHARGE PLAN (ADULT/PEDIATRIC) - NURSING INSTRUCTIONS
Please feel free to contact us at [phone] if any problems arise. After 6PM, Monday through Friday, on weekends and on holidays, please call [phone] and ask for the radiology resident on call to be paged.

## 2024-03-22 ENCOUNTER — NON-APPOINTMENT (OUTPATIENT)
Age: 69
End: 2024-03-22

## 2024-03-22 LAB — TM INTERPRETATION: NORMAL

## 2024-03-22 NOTE — ADDENDUM
[FreeTextEntry1] : CARDIAC TESTIN24 regadenoson tetrofosmin - no evidence of infarction or inducible ischemia, LVEF 66% 24 ECHO - normal LA, hyperdynamic LV, LVEF 75%, no pericardial effusion  CARDIAC CLEARANCE: Test results reviewed. At this time, patient is considered an acceptable risk from a cardiac standpoint for renal transplant.

## 2024-03-22 NOTE — HISTORY OF PRESENT ILLNESS
[FreeTextEntry1] : Currently doing well. Denies chest pain, shortness of breath or palpitations. Anticipating renal transplant at the end of the month.

## 2024-03-22 NOTE — PHYSICAL EXAM
[Well Developed] : well developed [No Acute Distress] : no acute distress [Well Nourished] : well nourished [Normal Conjunctiva] : normal conjunctiva [No Carotid Bruit] : no carotid bruit [Normal Venous Pressure] : normal venous pressure [Normal S1, S2] : normal S1, S2 [No Rub] : no rub [No Murmur] : no murmur [Clear Lung Fields] : clear lung fields [No Gallop] : no gallop [Good Air Entry] : good air entry [No Respiratory Distress] : no respiratory distress  [Soft] : abdomen soft [Non Tender] : non-tender [No Edema] : no edema [Normal Gait] : normal gait [No Cyanosis] : no cyanosis [No Rash] : no rash [No Skin Lesions] : no skin lesions [Moves all extremities] : moves all extremities [No Focal Deficits] : no focal deficits [Normal Speech] : normal speech [Alert and Oriented] : alert and oriented [de-identified] : left forearm fistula

## 2024-03-22 NOTE — DISCUSSION/SUMMARY
[Hypertension] : hypertension [Stable] : stable [Low Sodium Diet] : low sodium diet [EKG obtained to assist in diagnosis and management of assessed problem(s)] : EKG obtained to assist in diagnosis and management of assessed problem(s) [FreeTextEntry1] : Currently stable from a cardiovascular standpoint. Normotensive. Appears euvolemic. No ischemic or CHF symptoms. History of pericardial effusion (s/p pericardiocentesis). Continue current medications including metoprolol tartrate 50 mg twice daily. ECG completed today and reviewed (findings as noted above). Will schedule a pharmacologic nuclear stress test for cardiac risk stratification. In addition, will schedule an echo to reassess his cardiac structures and function. At this time, patient is considered an acceptable risk from a cardiac standpoint for renal transplant.

## 2024-03-22 NOTE — CARDIOLOGY SUMMARY
[de-identified] : 03/07/24 - sinus tachycardia, 101 bpm, RBBB, LAFB [de-identified] : 01/17/23 (regadenoson tetrofosmin) - no evidence of infarction or inducible ischemia, LVEF 62% 12/11/20 (exercise tetrofosmin) - 4 METS, 96% MPHR, 04:20 min, Duke score -5, no evidence of infarction or inducible ischemia, LVEF 56% [de-identified] : 12/13/22 - no obvious pericardial effusion immediately following pericardiocentesis 12/13/22 - MAC, normal LA, grossly normal LV systolic function, normal RV size and function, large pericardial effusion with RA and RV diastolic collapse consistent with cardiac tamponade, left pleural effusion, ascites seen, LVEF 74% 07/30/20 - mild MAC, normal LV and RV size and function, LVEF 73% [de-identified] : \par  12/13/22 (pericardiocentesis) - drained 1520 mL of bloody pericardial fluid via subxiphoid approach

## 2024-03-24 ENCOUNTER — TRANSCRIPTION ENCOUNTER (OUTPATIENT)
Age: 69
End: 2024-03-24

## 2024-03-25 ENCOUNTER — APPOINTMENT (OUTPATIENT)
Dept: TRANSPLANT | Facility: HOSPITAL | Age: 69
End: 2024-03-25

## 2024-03-25 ENCOUNTER — INPATIENT (INPATIENT)
Facility: HOSPITAL | Age: 69
LOS: 3 days | Discharge: HOME HEALTH | DRG: 652 | End: 2024-03-29
Attending: SURGERY | Admitting: SURGERY
Payer: MEDICARE

## 2024-03-25 VITALS — WEIGHT: 200.62 LBS | HEIGHT: 69.29 IN

## 2024-03-25 DIAGNOSIS — Z98.84 BARIATRIC SURGERY STATUS: Chronic | ICD-10-CM

## 2024-03-25 DIAGNOSIS — Z87.898 PERSONAL HISTORY OF OTHER SPECIFIED CONDITIONS: Chronic | ICD-10-CM

## 2024-03-25 DIAGNOSIS — Z89.429 ACQUIRED ABSENCE OF OTHER TOE(S), UNSPECIFIED SIDE: Chronic | ICD-10-CM

## 2024-03-25 DIAGNOSIS — Z98.49 CATARACT EXTRACTION STATUS, UNSPECIFIED EYE: Chronic | ICD-10-CM

## 2024-03-25 DIAGNOSIS — N18.9 CHRONIC KIDNEY DISEASE, UNSPECIFIED: ICD-10-CM

## 2024-03-25 DIAGNOSIS — Z98.890 OTHER SPECIFIED POSTPROCEDURAL STATES: Chronic | ICD-10-CM

## 2024-03-25 LAB
ALBUMIN SERPL ELPH-MCNC: 3.4 G/DL — SIGNIFICANT CHANGE UP (ref 3.3–5)
ALBUMIN SERPL ELPH-MCNC: 3.7 G/DL — SIGNIFICANT CHANGE UP (ref 3.3–5)
ALBUMIN SERPL ELPH-MCNC: 4.4 G/DL — SIGNIFICANT CHANGE UP (ref 3.3–5)
ALP SERPL-CCNC: 109 U/L — SIGNIFICANT CHANGE UP (ref 40–120)
ALP SERPL-CCNC: 87 U/L — SIGNIFICANT CHANGE UP (ref 40–120)
ALP SERPL-CCNC: 94 U/L — SIGNIFICANT CHANGE UP (ref 40–120)
ALT FLD-CCNC: 20 U/L — SIGNIFICANT CHANGE UP (ref 10–45)
ALT FLD-CCNC: 22 U/L — SIGNIFICANT CHANGE UP (ref 10–45)
ALT FLD-CCNC: 27 U/L — SIGNIFICANT CHANGE UP (ref 10–45)
ANION GAP SERPL CALC-SCNC: 19 MMOL/L — HIGH (ref 5–17)
ANION GAP SERPL CALC-SCNC: 23 MMOL/L — HIGH (ref 5–17)
ANION GAP SERPL CALC-SCNC: 23 MMOL/L — HIGH (ref 5–17)
ANISOCYTOSIS BLD QL: SLIGHT — SIGNIFICANT CHANGE UP
APTT BLD: 25 SEC — SIGNIFICANT CHANGE UP (ref 24.5–35.6)
AST SERPL-CCNC: 23 U/L — SIGNIFICANT CHANGE UP (ref 10–40)
AST SERPL-CCNC: 24 U/L — SIGNIFICANT CHANGE UP (ref 10–40)
AST SERPL-CCNC: 28 U/L — SIGNIFICANT CHANGE UP (ref 10–40)
BASE EXCESS BLDV CALC-SCNC: 1.2 MMOL/L — SIGNIFICANT CHANGE UP (ref -2–3)
BASOPHILS # BLD AUTO: 0 K/UL — SIGNIFICANT CHANGE UP (ref 0–0.2)
BASOPHILS # BLD AUTO: 0.01 K/UL — SIGNIFICANT CHANGE UP (ref 0–0.2)
BASOPHILS # BLD AUTO: 0.05 K/UL — SIGNIFICANT CHANGE UP (ref 0–0.2)
BASOPHILS NFR BLD AUTO: 0 % — SIGNIFICANT CHANGE UP (ref 0–2)
BASOPHILS NFR BLD AUTO: 0.1 % — SIGNIFICANT CHANGE UP (ref 0–2)
BASOPHILS NFR BLD AUTO: 0.6 % — SIGNIFICANT CHANGE UP (ref 0–2)
BILIRUB SERPL-MCNC: 0.4 MG/DL — SIGNIFICANT CHANGE UP (ref 0.2–1.2)
BUN SERPL-MCNC: 38 MG/DL — HIGH (ref 7–23)
BUN SERPL-MCNC: 40 MG/DL — HIGH (ref 7–23)
BUN SERPL-MCNC: 41 MG/DL — HIGH (ref 7–23)
CA-I SERPL-SCNC: 1.2 MMOL/L — SIGNIFICANT CHANGE UP (ref 1.15–1.33)
CALCIUM SERPL-MCNC: 8 MG/DL — LOW (ref 8.4–10.5)
CALCIUM SERPL-MCNC: 8.1 MG/DL — LOW (ref 8.4–10.5)
CALCIUM SERPL-MCNC: 9.8 MG/DL — SIGNIFICANT CHANGE UP (ref 8.4–10.5)
CHLORIDE BLDV-SCNC: 97 MMOL/L — SIGNIFICANT CHANGE UP (ref 96–108)
CHLORIDE SERPL-SCNC: 92 MMOL/L — LOW (ref 96–108)
CHLORIDE SERPL-SCNC: 92 MMOL/L — LOW (ref 96–108)
CHLORIDE SERPL-SCNC: 94 MMOL/L — LOW (ref 96–108)
CO2 BLDV-SCNC: 29 MMOL/L — HIGH (ref 22–26)
CO2 SERPL-SCNC: 18 MMOL/L — LOW (ref 22–31)
CO2 SERPL-SCNC: 20 MMOL/L — LOW (ref 22–31)
CO2 SERPL-SCNC: 22 MMOL/L — SIGNIFICANT CHANGE UP (ref 22–31)
CREAT SERPL-MCNC: 5.92 MG/DL — HIGH (ref 0.5–1.3)
CREAT SERPL-MCNC: 7.35 MG/DL — HIGH (ref 0.5–1.3)
CREAT SERPL-MCNC: 8.26 MG/DL — HIGH (ref 0.5–1.3)
EGFR: 10 ML/MIN/1.73M2 — LOW
EGFR: 7 ML/MIN/1.73M2 — LOW
EGFR: 8 ML/MIN/1.73M2 — LOW
EOSINOPHIL # BLD AUTO: 0 K/UL — SIGNIFICANT CHANGE UP (ref 0–0.5)
EOSINOPHIL # BLD AUTO: 0 K/UL — SIGNIFICANT CHANGE UP (ref 0–0.5)
EOSINOPHIL # BLD AUTO: 0.15 K/UL — SIGNIFICANT CHANGE UP (ref 0–0.5)
EOSINOPHIL NFR BLD AUTO: 0 % — SIGNIFICANT CHANGE UP (ref 0–6)
EOSINOPHIL NFR BLD AUTO: 0 % — SIGNIFICANT CHANGE UP (ref 0–6)
EOSINOPHIL NFR BLD AUTO: 1.8 % — SIGNIFICANT CHANGE UP (ref 0–6)
GAS PNL BLDV: 133 MMOL/L — LOW (ref 136–145)
GAS PNL BLDV: SIGNIFICANT CHANGE UP
GLUCOSE BLDC GLUCOMTR-MCNC: 101 MG/DL — HIGH (ref 70–99)
GLUCOSE BLDC GLUCOMTR-MCNC: 168 MG/DL — HIGH (ref 70–99)
GLUCOSE BLDC GLUCOMTR-MCNC: 173 MG/DL — HIGH (ref 70–99)
GLUCOSE BLDV-MCNC: 97 MG/DL — SIGNIFICANT CHANGE UP (ref 70–99)
GLUCOSE SERPL-MCNC: 103 MG/DL — HIGH (ref 70–99)
GLUCOSE SERPL-MCNC: 163 MG/DL — HIGH (ref 70–99)
GLUCOSE SERPL-MCNC: 181 MG/DL — HIGH (ref 70–99)
HBV CORE AB SER-ACNC: SIGNIFICANT CHANGE UP
HBV SURFACE AB SER-ACNC: <3 MIU/ML — LOW
HBV SURFACE AG SER-ACNC: SIGNIFICANT CHANGE UP
HCO3 BLDV-SCNC: 27 MMOL/L — SIGNIFICANT CHANGE UP (ref 22–29)
HCT VFR BLD CALC: 26.6 % — LOW (ref 39–50)
HCT VFR BLD CALC: 28.2 % — LOW (ref 39–50)
HCT VFR BLD CALC: 32.1 % — LOW (ref 39–50)
HCT VFR BLDA CALC: 33 % — LOW (ref 39–51)
HCV AB S/CO SERPL IA: 0.06 S/CO — SIGNIFICANT CHANGE UP (ref 0–0.99)
HCV AB SERPL-IMP: SIGNIFICANT CHANGE UP
HCV RNA SPEC NAA+PROBE-LOG IU: SIGNIFICANT CHANGE UP
HCV RNA SPEC NAA+PROBE-LOG IU: SIGNIFICANT CHANGE UP LOGIU/ML
HGB BLD CALC-MCNC: 10.9 G/DL — LOW (ref 12.6–17.4)
HGB BLD-MCNC: 10.5 G/DL — LOW (ref 13–17)
HGB BLD-MCNC: 8.8 G/DL — LOW (ref 13–17)
HGB BLD-MCNC: 9.4 G/DL — LOW (ref 13–17)
HIV 1+2 AB+HIV1 P24 AG SERPL QL IA: SIGNIFICANT CHANGE UP
IMM GRANULOCYTES NFR BLD AUTO: 0.5 % — SIGNIFICANT CHANGE UP (ref 0–0.9)
IMM GRANULOCYTES NFR BLD AUTO: 0.7 % — SIGNIFICANT CHANGE UP (ref 0–0.9)
INR BLD: 0.98 RATIO — SIGNIFICANT CHANGE UP (ref 0.85–1.18)
LACTATE BLDV-MCNC: 1.5 MMOL/L — SIGNIFICANT CHANGE UP (ref 0.5–2)
LYMPHOCYTES # BLD AUTO: 0.15 K/UL — LOW (ref 1–3.3)
LYMPHOCYTES # BLD AUTO: 0.15 K/UL — LOW (ref 1–3.3)
LYMPHOCYTES # BLD AUTO: 1.07 K/UL — SIGNIFICANT CHANGE UP (ref 1–3.3)
LYMPHOCYTES # BLD AUTO: 1.5 % — LOW (ref 13–44)
LYMPHOCYTES # BLD AUTO: 1.7 % — LOW (ref 13–44)
LYMPHOCYTES # BLD AUTO: 12.9 % — LOW (ref 13–44)
MACROCYTES BLD QL: SLIGHT — SIGNIFICANT CHANGE UP
MAGNESIUM SERPL-MCNC: 2.6 MG/DL — SIGNIFICANT CHANGE UP (ref 1.6–2.6)
MAGNESIUM SERPL-MCNC: 2.6 MG/DL — SIGNIFICANT CHANGE UP (ref 1.6–2.6)
MAGNESIUM SERPL-MCNC: 2.7 MG/DL — HIGH (ref 1.6–2.6)
MANUAL SMEAR VERIFICATION: SIGNIFICANT CHANGE UP
MCHC RBC-ENTMCNC: 32.7 GM/DL — SIGNIFICANT CHANGE UP (ref 32–36)
MCHC RBC-ENTMCNC: 33.1 GM/DL — SIGNIFICANT CHANGE UP (ref 32–36)
MCHC RBC-ENTMCNC: 33.3 GM/DL — SIGNIFICANT CHANGE UP (ref 32–36)
MCHC RBC-ENTMCNC: 33.8 PG — SIGNIFICANT CHANGE UP (ref 27–34)
MCHC RBC-ENTMCNC: 34.4 PG — HIGH (ref 27–34)
MCHC RBC-ENTMCNC: 34.4 PG — HIGH (ref 27–34)
MCV RBC AUTO: 103.2 FL — HIGH (ref 80–100)
MCV RBC AUTO: 103.3 FL — HIGH (ref 80–100)
MCV RBC AUTO: 103.9 FL — HIGH (ref 80–100)
MONOCYTES # BLD AUTO: 0 K/UL — SIGNIFICANT CHANGE UP (ref 0–0.9)
MONOCYTES # BLD AUTO: 0.23 K/UL — SIGNIFICANT CHANGE UP (ref 0–0.9)
MONOCYTES # BLD AUTO: 0.51 K/UL — SIGNIFICANT CHANGE UP (ref 0–0.9)
MONOCYTES NFR BLD AUTO: 0 % — LOW (ref 2–14)
MONOCYTES NFR BLD AUTO: 2.3 % — SIGNIFICANT CHANGE UP (ref 2–14)
MONOCYTES NFR BLD AUTO: 6.1 % — SIGNIFICANT CHANGE UP (ref 2–14)
NEUTROPHILS # BLD AUTO: 6.49 K/UL — SIGNIFICANT CHANGE UP (ref 1.8–7.4)
NEUTROPHILS # BLD AUTO: 8.84 K/UL — HIGH (ref 1.8–7.4)
NEUTROPHILS # BLD AUTO: 9.41 K/UL — HIGH (ref 1.8–7.4)
NEUTROPHILS NFR BLD AUTO: 78.1 % — HIGH (ref 43–77)
NEUTROPHILS NFR BLD AUTO: 95.4 % — HIGH (ref 43–77)
NEUTROPHILS NFR BLD AUTO: 98.3 % — HIGH (ref 43–77)
NRBC # BLD: 0 /100 WBCS — SIGNIFICANT CHANGE UP (ref 0–0)
NRBC # BLD: 0 /100 WBCS — SIGNIFICANT CHANGE UP (ref 0–0)
OVALOCYTES BLD QL SMEAR: SLIGHT — SIGNIFICANT CHANGE UP
PCO2 BLDV: 48 MMHG — SIGNIFICANT CHANGE UP (ref 42–55)
PH BLDV: 7.36 — SIGNIFICANT CHANGE UP (ref 7.32–7.43)
PHOSPHATE SERPL-MCNC: 4.9 MG/DL — HIGH (ref 2.5–4.5)
PHOSPHATE SERPL-MCNC: 5.1 MG/DL — HIGH (ref 2.5–4.5)
PHOSPHATE SERPL-MCNC: 6.3 MG/DL — HIGH (ref 2.5–4.5)
PLAT MORPH BLD: NORMAL — SIGNIFICANT CHANGE UP
PLATELET # BLD AUTO: 109 K/UL — LOW (ref 150–400)
PLATELET # BLD AUTO: 130 K/UL — LOW (ref 150–400)
PLATELET # BLD AUTO: 179 K/UL — SIGNIFICANT CHANGE UP (ref 150–400)
PO2 BLDV: 27 MMHG — SIGNIFICANT CHANGE UP (ref 25–45)
POIKILOCYTOSIS BLD QL AUTO: SLIGHT — SIGNIFICANT CHANGE UP
POTASSIUM BLDV-SCNC: 4.7 MMOL/L — SIGNIFICANT CHANGE UP (ref 3.5–5.1)
POTASSIUM SERPL-MCNC: 4.6 MMOL/L — SIGNIFICANT CHANGE UP (ref 3.5–5.3)
POTASSIUM SERPL-MCNC: 4.6 MMOL/L — SIGNIFICANT CHANGE UP (ref 3.5–5.3)
POTASSIUM SERPL-MCNC: 4.8 MMOL/L — SIGNIFICANT CHANGE UP (ref 3.5–5.3)
POTASSIUM SERPL-SCNC: 4.6 MMOL/L — SIGNIFICANT CHANGE UP (ref 3.5–5.3)
POTASSIUM SERPL-SCNC: 4.6 MMOL/L — SIGNIFICANT CHANGE UP (ref 3.5–5.3)
POTASSIUM SERPL-SCNC: 4.8 MMOL/L — SIGNIFICANT CHANGE UP (ref 3.5–5.3)
PROT SERPL-MCNC: 6 G/DL — SIGNIFICANT CHANGE UP (ref 6–8.3)
PROT SERPL-MCNC: 6.2 G/DL — SIGNIFICANT CHANGE UP (ref 6–8.3)
PROT SERPL-MCNC: 7.6 G/DL — SIGNIFICANT CHANGE UP (ref 6–8.3)
PROTHROM AB SERPL-ACNC: 10.8 SEC — SIGNIFICANT CHANGE UP (ref 9.5–13)
RBC # BLD: 2.56 M/UL — LOW (ref 4.2–5.8)
RBC # BLD: 2.73 M/UL — LOW (ref 4.2–5.8)
RBC # BLD: 3.11 M/UL — LOW (ref 4.2–5.8)
RBC # FLD: 14.1 % — SIGNIFICANT CHANGE UP (ref 10.3–14.5)
RBC # FLD: 14.4 % — SIGNIFICANT CHANGE UP (ref 10.3–14.5)
RBC # FLD: 14.4 % — SIGNIFICANT CHANGE UP (ref 10.3–14.5)
RBC BLD AUTO: ABNORMAL
SAO2 % BLDV: 42.8 % — LOW (ref 67–88)
SARS-COV-2 N GENE NPH QL NAA+PROBE: DETECTED
SARS-COV-2 RNA SPEC QL NAA+PROBE: SIGNIFICANT CHANGE UP
SODIUM SERPL-SCNC: 133 MMOL/L — LOW (ref 135–145)
SODIUM SERPL-SCNC: 133 MMOL/L — LOW (ref 135–145)
SODIUM SERPL-SCNC: 137 MMOL/L — SIGNIFICANT CHANGE UP (ref 135–145)
SURGICAL PATHOLOGY STUDY: SIGNIFICANT CHANGE UP
WBC # BLD: 8.31 K/UL — SIGNIFICANT CHANGE UP (ref 3.8–10.5)
WBC # BLD: 8.99 K/UL — SIGNIFICANT CHANGE UP (ref 3.8–10.5)
WBC # BLD: 9.87 K/UL — SIGNIFICANT CHANGE UP (ref 3.8–10.5)
WBC # FLD AUTO: 8.31 K/UL — SIGNIFICANT CHANGE UP (ref 3.8–10.5)
WBC # FLD AUTO: 8.99 K/UL — SIGNIFICANT CHANGE UP (ref 3.8–10.5)
WBC # FLD AUTO: 9.87 K/UL — SIGNIFICANT CHANGE UP (ref 3.8–10.5)

## 2024-03-25 PROCEDURE — 99232 SBSQ HOSP IP/OBS MODERATE 35: CPT

## 2024-03-25 PROCEDURE — 50360 RNL ALTRNSPLJ W/O RCP NFRCT: CPT | Mod: GC

## 2024-03-25 PROCEDURE — 50605 INSERT URETERAL SUPPORT: CPT | Mod: RT,GC

## 2024-03-25 PROCEDURE — 93010 ELECTROCARDIOGRAM REPORT: CPT

## 2024-03-25 PROCEDURE — 50325 PREP DONOR RENAL GRAFT: CPT | Mod: GC

## 2024-03-25 PROCEDURE — 76776 US EXAM K TRANSPL W/DOPPLER: CPT | Mod: 26,RT

## 2024-03-25 PROCEDURE — 76998 US GUIDE INTRAOP: CPT | Mod: 26,GC

## 2024-03-25 PROCEDURE — 71045 X-RAY EXAM CHEST 1 VIEW: CPT | Mod: 26

## 2024-03-25 DEVICE — VISTASEAL FIBRIN HUMAN 10ML: Type: IMPLANTABLE DEVICE | Status: FUNCTIONAL

## 2024-03-25 DEVICE — STENT URET DBL J CLSD 6FRX12CM: Type: IMPLANTABLE DEVICE | Status: FUNCTIONAL

## 2024-03-25 RX ORDER — CEFAZOLIN SODIUM 1 G
2000 VIAL (EA) INJECTION ONCE
Refills: 0 | Status: COMPLETED | OUTPATIENT
Start: 2024-03-25 | End: 2024-03-25

## 2024-03-25 RX ORDER — FAMOTIDINE 10 MG/ML
20 INJECTION INTRAVENOUS DAILY
Refills: 0 | Status: DISCONTINUED | OUTPATIENT
Start: 2024-03-26 | End: 2024-03-29

## 2024-03-25 RX ORDER — NYSTATIN 500MM UNIT
500000 POWDER (EA) MISCELLANEOUS
Refills: 0 | Status: DISCONTINUED | OUTPATIENT
Start: 2024-03-26 | End: 2024-03-29

## 2024-03-25 RX ORDER — INSULIN LISPRO 100/ML
VIAL (ML) SUBCUTANEOUS AT BEDTIME
Refills: 0 | Status: DISCONTINUED | OUTPATIENT
Start: 2024-03-25 | End: 2024-03-29

## 2024-03-25 RX ORDER — TACROLIMUS 5 MG/1
7 CAPSULE ORAL
Refills: 0 | Status: DISCONTINUED | OUTPATIENT
Start: 2024-03-26 | End: 2024-03-27

## 2024-03-25 RX ORDER — DEXTROSE 50 % IN WATER 50 %
25 SYRINGE (ML) INTRAVENOUS ONCE
Refills: 0 | Status: DISCONTINUED | OUTPATIENT
Start: 2024-03-25 | End: 2024-03-29

## 2024-03-25 RX ORDER — REMDESIVIR 5 MG/ML
INJECTION INTRAVENOUS
Refills: 0 | Status: DISCONTINUED | OUTPATIENT
Start: 2024-03-25 | End: 2024-03-26

## 2024-03-25 RX ORDER — HYDROMORPHONE HYDROCHLORIDE 2 MG/ML
0.5 INJECTION INTRAMUSCULAR; INTRAVENOUS; SUBCUTANEOUS
Refills: 0 | Status: DISCONTINUED | OUTPATIENT
Start: 2024-03-25 | End: 2024-03-25

## 2024-03-25 RX ORDER — INFLUENZA VIRUS VACCINE 15; 15; 15; 15 UG/.5ML; UG/.5ML; UG/.5ML; UG/.5ML
0.7 SUSPENSION INTRAMUSCULAR ONCE
Refills: 0 | Status: DISCONTINUED | OUTPATIENT
Start: 2024-03-25 | End: 2024-03-29

## 2024-03-25 RX ORDER — ONDANSETRON 8 MG/1
4 TABLET, FILM COATED ORAL ONCE
Refills: 0 | Status: DISCONTINUED | OUTPATIENT
Start: 2024-03-25 | End: 2024-03-25

## 2024-03-25 RX ORDER — SODIUM CHLORIDE 9 MG/ML
1000 INJECTION, SOLUTION INTRAVENOUS
Refills: 0 | Status: DISCONTINUED | OUTPATIENT
Start: 2024-03-25 | End: 2024-03-27

## 2024-03-25 RX ORDER — MYCOPHENOLATE MOFETIL 250 MG/1
1 CAPSULE ORAL EVERY 12 HOURS
Refills: 0 | Status: DISCONTINUED | OUTPATIENT
Start: 2024-03-26 | End: 2024-03-29

## 2024-03-25 RX ORDER — HEXAVITAMINS
100 TABLET ORAL DAILY
Refills: 0 | Status: DISCONTINUED | OUTPATIENT
Start: 2024-03-25 | End: 2024-03-25

## 2024-03-25 RX ORDER — BASILIXIMAB 20 MG/5ML
20 INJECTION, POWDER, FOR SOLUTION INTRAVENOUS ONCE
Refills: 0 | Status: COMPLETED | OUTPATIENT
Start: 2024-03-29 | End: 2024-03-29

## 2024-03-25 RX ORDER — GLUCAGON INJECTION, SOLUTION 0.5 MG/.1ML
1 INJECTION, SOLUTION SUBCUTANEOUS ONCE
Refills: 0 | Status: DISCONTINUED | OUTPATIENT
Start: 2024-03-25 | End: 2024-03-29

## 2024-03-25 RX ORDER — CHLORHEXIDINE GLUCONATE 213 G/1000ML
1 SOLUTION TOPICAL ONCE
Refills: 0 | Status: DISCONTINUED | OUTPATIENT
Start: 2024-03-25 | End: 2024-03-25

## 2024-03-25 RX ORDER — VALGANCICLOVIR 450 MG/1
450 TABLET, FILM COATED ORAL DAILY
Refills: 0 | Status: DISCONTINUED | OUTPATIENT
Start: 2024-03-26 | End: 2024-03-27

## 2024-03-25 RX ORDER — PYRIDOXINE HCL (VITAMIN B6) 100 MG
50 TABLET ORAL DAILY
Refills: 0 | Status: DISCONTINUED | OUTPATIENT
Start: 2024-03-25 | End: 2024-03-29

## 2024-03-25 RX ORDER — LEVOTHYROXINE SODIUM 125 MCG
125 TABLET ORAL DAILY
Refills: 0 | Status: DISCONTINUED | OUTPATIENT
Start: 2024-03-25 | End: 2024-03-29

## 2024-03-25 RX ORDER — SENNA PLUS 8.6 MG/1
2 TABLET ORAL AT BEDTIME
Refills: 0 | Status: DISCONTINUED | OUTPATIENT
Start: 2024-03-25 | End: 2024-03-29

## 2024-03-25 RX ORDER — BASILIXIMAB 20 MG/5ML
20 INJECTION, POWDER, FOR SOLUTION INTRAVENOUS ONCE
Refills: 0 | Status: DISCONTINUED | OUTPATIENT
Start: 2024-03-25 | End: 2024-03-25

## 2024-03-25 RX ORDER — LIDOCAINE HCL 20 MG/ML
0.2 VIAL (ML) INJECTION ONCE
Refills: 0 | Status: DISCONTINUED | OUTPATIENT
Start: 2024-03-25 | End: 2024-03-25

## 2024-03-25 RX ORDER — SODIUM CHLORIDE 9 MG/ML
1000 INJECTION, SOLUTION INTRAVENOUS
Refills: 0 | Status: DISCONTINUED | OUTPATIENT
Start: 2024-03-25 | End: 2024-03-29

## 2024-03-25 RX ORDER — TRAMADOL HYDROCHLORIDE 50 MG/1
50 TABLET ORAL EVERY 8 HOURS
Refills: 0 | Status: DISCONTINUED | OUTPATIENT
Start: 2024-03-25 | End: 2024-03-29

## 2024-03-25 RX ORDER — INSULIN LISPRO 100/ML
VIAL (ML) SUBCUTANEOUS
Refills: 0 | Status: DISCONTINUED | OUTPATIENT
Start: 2024-03-25 | End: 2024-03-29

## 2024-03-25 RX ORDER — ACETAMINOPHEN 500 MG
650 TABLET ORAL EVERY 6 HOURS
Refills: 0 | Status: DISCONTINUED | OUTPATIENT
Start: 2024-03-25 | End: 2024-03-29

## 2024-03-25 RX ORDER — POLYETHYLENE GLYCOL 3350 17 G/17G
17 POWDER, FOR SOLUTION ORAL DAILY
Refills: 0 | Status: DISCONTINUED | OUTPATIENT
Start: 2024-03-25 | End: 2024-03-29

## 2024-03-25 RX ORDER — ONDANSETRON 8 MG/1
4 TABLET, FILM COATED ORAL EVERY 6 HOURS
Refills: 0 | Status: DISCONTINUED | OUTPATIENT
Start: 2024-03-25 | End: 2024-03-29

## 2024-03-25 RX ORDER — ALBUTEROL 90 UG/1
2 AEROSOL, METERED ORAL EVERY 6 HOURS
Refills: 0 | Status: DISCONTINUED | OUTPATIENT
Start: 2024-03-25 | End: 2024-03-29

## 2024-03-25 RX ORDER — DEXTROSE 50 % IN WATER 50 %
15 SYRINGE (ML) INTRAVENOUS ONCE
Refills: 0 | Status: DISCONTINUED | OUTPATIENT
Start: 2024-03-25 | End: 2024-03-29

## 2024-03-25 RX ORDER — DEXTROSE 50 % IN WATER 50 %
12.5 SYRINGE (ML) INTRAVENOUS ONCE
Refills: 0 | Status: DISCONTINUED | OUTPATIENT
Start: 2024-03-25 | End: 2024-03-29

## 2024-03-25 RX ORDER — TRAMADOL HYDROCHLORIDE 50 MG/1
25 TABLET ORAL EVERY 6 HOURS
Refills: 0 | Status: DISCONTINUED | OUTPATIENT
Start: 2024-03-25 | End: 2024-03-29

## 2024-03-25 RX ORDER — REMDESIVIR 5 MG/ML
200 INJECTION INTRAVENOUS EVERY 24 HOURS
Refills: 0 | Status: COMPLETED | OUTPATIENT
Start: 2024-03-25 | End: 2024-03-25

## 2024-03-25 RX ORDER — CHLORHEXIDINE GLUCONATE 213 G/1000ML
1 SOLUTION TOPICAL DAILY
Refills: 0 | Status: DISCONTINUED | OUTPATIENT
Start: 2024-03-25 | End: 2024-03-29

## 2024-03-25 RX ORDER — SODIUM CHLORIDE 9 MG/ML
3 INJECTION INTRAMUSCULAR; INTRAVENOUS; SUBCUTANEOUS EVERY 8 HOURS
Refills: 0 | Status: DISCONTINUED | OUTPATIENT
Start: 2024-03-25 | End: 2024-03-25

## 2024-03-25 RX ORDER — REMDESIVIR 5 MG/ML
100 INJECTION INTRAVENOUS EVERY 24 HOURS
Refills: 0 | Status: DISCONTINUED | OUTPATIENT
Start: 2024-03-26 | End: 2024-03-26

## 2024-03-25 RX ORDER — HEXAVITAMINS
300 TABLET ORAL DAILY
Refills: 0 | Status: DISCONTINUED | OUTPATIENT
Start: 2024-03-25 | End: 2024-03-29

## 2024-03-25 RX ORDER — SODIUM CHLORIDE 9 MG/ML
1000 INJECTION, SOLUTION INTRAVENOUS
Refills: 0 | Status: DISCONTINUED | OUTPATIENT
Start: 2024-03-25 | End: 2024-03-26

## 2024-03-25 RX ADMIN — HYDROMORPHONE HYDROCHLORIDE 0.5 MILLIGRAM(S): 2 INJECTION INTRAMUSCULAR; INTRAVENOUS; SUBCUTANEOUS at 16:50

## 2024-03-25 RX ADMIN — HYDROMORPHONE HYDROCHLORIDE 0.5 MILLIGRAM(S): 2 INJECTION INTRAMUSCULAR; INTRAVENOUS; SUBCUTANEOUS at 18:00

## 2024-03-25 RX ADMIN — SODIUM CHLORIDE 50 MILLILITER(S): 9 INJECTION, SOLUTION INTRAVENOUS at 16:34

## 2024-03-25 RX ADMIN — HYDROMORPHONE HYDROCHLORIDE 0.5 MILLIGRAM(S): 2 INJECTION INTRAMUSCULAR; INTRAVENOUS; SUBCUTANEOUS at 21:15

## 2024-03-25 RX ADMIN — REMDESIVIR 200 MILLIGRAM(S): 5 INJECTION INTRAVENOUS at 19:30

## 2024-03-25 RX ADMIN — SODIUM CHLORIDE 70 MILLILITER(S): 9 INJECTION, SOLUTION INTRAVENOUS at 16:34

## 2024-03-25 RX ADMIN — HYDROMORPHONE HYDROCHLORIDE 0.5 MILLIGRAM(S): 2 INJECTION INTRAMUSCULAR; INTRAVENOUS; SUBCUTANEOUS at 16:35

## 2024-03-25 RX ADMIN — HYDROMORPHONE HYDROCHLORIDE 0.5 MILLIGRAM(S): 2 INJECTION INTRAMUSCULAR; INTRAVENOUS; SUBCUTANEOUS at 17:47

## 2024-03-25 RX ADMIN — SENNA PLUS 2 TABLET(S): 8.6 TABLET ORAL at 22:26

## 2024-03-25 RX ADMIN — HYDROMORPHONE HYDROCHLORIDE 0.5 MILLIGRAM(S): 2 INJECTION INTRAMUSCULAR; INTRAVENOUS; SUBCUTANEOUS at 21:00

## 2024-03-25 NOTE — BRIEF OPERATIVE NOTE - OPERATION/FINDINGS
Left kidney to right side. Two arteries; small upper artery ligated. Doppler used to assess flow, approximately 1.5 x 1.5 cm area in the upper pole with reduced flow. One vein and one ureter. Drain left in the surgical bed.

## 2024-03-25 NOTE — PATIENT PROFILE ADULT - FALL HARM RISK - RISK INTERVENTIONS

## 2024-03-25 NOTE — PROGRESS NOTE ADULT - SUBJECTIVE AND OBJECTIVE BOX
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 Now s/p LDRT with Simulect induction 3/25/2024  Donor’s Surgeon: Dr. Salima GREENE ID: [id number]    B.      Unilaterality:  Left    D.      Relationship to recipient: [employer] donor    C.      ABO: A    E.      Age: 34   y.o.  Sex: Male  Ethnicity:      F.      PMH: None    G.     PSH: Pyloric stenosis surgery at 6 weeks old    H.     Allergies: PCN    I.      CMV IgG: Neg    J.      EBV IgG:  Pos  Quant-TB:  Positive    Recipient: ABO: A, CMV IgG: Neg, EBV IgG: Pos, Quant-TB:  Neg, HLA/ CDC/FLOW: 2,2,2. No DSA. Auto CDC XM: B cell neg, T cell neg; Donor XM: T cell neg.    OR: Left Kidney -> Right Side, 2 arteries; only larger, lower artery utilized (small area of reduced flow to upper pole), 1 vein, 1 ureter. EBL 100ml, UOP 300cc        Interval Events:    Immunosuppression:      Potential Discharge date:    Education:  Medications    Plan of care:  See Below    MEDICATIONS  (STANDING):  albuterol    90 MICROgram(s) HFA Inhaler 2 Puff(s) Inhalation every 6 hours  chlorhexidine 2% Cloths 1 Application(s) Topical daily  dextrose 5%. 1000 milliLiter(s) (50 mL/Hr) IV Continuous <Continuous>  dextrose 5%. 1000 milliLiter(s) (100 mL/Hr) IV Continuous <Continuous>  dextrose 50% Injectable 25 Gram(s) IV Push once  dextrose 50% Injectable 12.5 Gram(s) IV Push once  dextrose 50% Injectable 25 Gram(s) IV Push once  glucagon  Injectable 1 milliGRAM(s) IntraMuscular once  influenza  Vaccine (HIGH DOSE) 0.7 milliLiter(s) IntraMuscular once  insulin lispro (ADMELOG) corrective regimen sliding scale   SubCutaneous three times a day before meals  insulin lispro (ADMELOG) corrective regimen sliding scale   SubCutaneous at bedtime  isoniazid 300 milliGRAM(s) Oral daily  levothyroxine 125 MICROGram(s) Oral daily  multiple electrolytes Injection Type 1 1000 milliLiter(s) (50 mL/Hr) IV Continuous <Continuous>  multiple electrolytes Injection Type 1 1000 milliLiter(s) (70 mL/Hr) IV Continuous <Continuous>  polyethylene glycol 3350 17 Gram(s) Oral daily  pyridoxine 50 milliGRAM(s) Oral daily  remdesivir  IVPB   IV Intermittent   remdesivir  IVPB 200 milliGRAM(s) IV Intermittent every 24 hours  senna 2 Tablet(s) Oral at bedtime    MEDICATIONS  (PRN):  acetaminophen     Tablet .. 650 milliGRAM(s) Oral every 6 hours PRN Mild Pain (1 - 3)  dextrose Oral Gel 15 Gram(s) Oral once PRN Blood Glucose LESS THAN 70 milliGRAM(s)/deciliter  HYDROmorphone  Injectable 0.5 milliGRAM(s) IV Push every 10 minutes PRN Moderate Pain (4 - 6)  ondansetron Injectable 4 milliGRAM(s) IV Push every 6 hours PRN Nausea and/or Vomiting  ondansetron Injectable 4 milliGRAM(s) IV Push once PRN Nausea and/or Vomiting  traMADol 25 milliGRAM(s) Oral every 6 hours PRN Moderate Pain (4 - 6)  traMADol 50 milliGRAM(s) Oral every 8 hours PRN Severe Pain (7 - 10)      PAST MEDICAL & SURGICAL HISTORY:  Hard of hearing  Bilateral hearing Aids      Type 2 diabetes mellitus      HTN (hypertension)      Hypothyroidism      Osteomyelitis      ESRD on dialysis      S/P partial thyroidectomy  '95  benign      H/O laparoscopic adjustable gastric banding  ' 2010      S/P cataract extraction  '08 and ' 2011   Bilateral      History of foreign body aspiration  7/2018  from Left Ear      History of amputation of toe          Vital Signs Last 24 Hrs  T(C): 36 (25 Mar 2024 16:00), Max: 36.4 (25 Mar 2024 08:24)  T(F): 96.8 (25 Mar 2024 16:00), Max: 97.5 (25 Mar 2024 08:24)  HR: 113 (25 Mar 2024 18:30) (94 - 113)  BP: 134/64 (25 Mar 2024 18:30) (125/65 - 142/68)  BP(mean): 91 (25 Mar 2024 18:30) (88 - 98)  RR: 16 (25 Mar 2024 18:30) (16 - 17)  SpO2: 100% (25 Mar 2024 18:30) (100% - 100%)    Parameters below as of 25 Mar 2024 18:30  Patient On (Oxygen Delivery Method): nasal cannula  O2 Flow (L/min): 2      I&O's Summary    25 Mar 2024 07:01  -  25 Mar 2024 18:40  --------------------------------------------------------  IN: 895 mL / OUT: 975 mL / NET: -80 mL                              9.4    8.99  )-----------( 130      ( 25 Mar 2024 16:26 )             28.2     03-25    133<L>  |  92<L>  |  40<H>  ----------------------------<  163<H>  4.8   |  18<L>  |  7.35<H>    Ca    8.1<L>      25 Mar 2024 16:26  Phos  4.9     03-25  Mg     2.6     03-25    TPro  6.2  /  Alb  3.7  /  TBili  0.4  /  DBili  x   /  AST  23  /  ALT  22  /  AlkPhos  94  03-25            Review of systems  Gen: No weight changes, fatigue, fevers/chills, weakness  Skin: No rashes  Head/Eyes/Ears/Mouth: No headache; Normal hearing; Normal vision w/o blurriness; No sinus pain/discomfort, sore throat  Respiratory: No dyspnea, cough, wheezing, hemoptysis  CV: No chest pain, PND, orthopnea  GI: Mild abdominal pain at surgical incision site; denies diarrhea, constipation, nausea, vomiting, melena, hematochezia  : No increased frequency, dysuria, hematuria, nocturia  MSK: No joint pain/swelling; no back pain; no edema  Neuro: No dizziness/lightheadedness, weakness, seizures, numbness, tingling  Heme: No easy bruising or bleeding  Endo: No heat/cold intolerance  Psych: No significant nervousness, anxiety, stress, depression  All other systems were reviewed and are negative, except as noted.      PHYSICAL EXAM:  Constitutional: Well developed / well nourished  Eyes: Anicteric, PERRLA  ENMT: nc/at  Neck: central line *****************  Respiratory: CTA B/L  Cardiovascular: RRR  Gastrointestinal: Soft, non distended, mild tenderness at the incision site; incision c/d/i; MARKY .....   Genitourinary: Urinary catheter in place*****Voiding spontaneously  Extremities: SCD's in place and working bilaterally, AVF.....  Vascular: Palpable dp pulses bilaterally  Neurological: A&O x3  Skin: no rashes, ulcerations or lesions;  Musculoskeletal: Moving all extremities  Psychiatric: Responsive     Transplant Surgery - POC  --------------------------------------------------------------  LDRT   3/25/2024         POD#0    Robert Cameron is a 69 y/o M with past medical history significant for PMH of obesity, proteinuria related to obesity, kidney failure diagnosed in 1981, DM2 since 1992, neuropathy, scoliosis, KRYSTIAN prior to weight loss from  lab band insertion in 2010 (currently in deflated position since foot surgery in 2019), pericardial effusion (s/p pericardiocentesis), 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 Now s/p LDRT with Simulect induction 3/25/2024  Donor: ABO: A, CMV IgG: Neg,  EBV IgG:  Pos, Quant-TB:  Positive  Recipient: ABO: A, CMV IgG: Neg, EBV IgG: Pos, Quant-TB:  Neg, HLA/ CDC/FLOW: 2,2,2. No DSA. Auto CDC XM: B cell neg, T cell neg; Donor XM: T cell neg.    OR: Left Kidney -> Right Side, 2 arteries; only larger, lower artery utilized (small area of reduced flow to upper pole), 1 vein, 1 ureter. EBL 100ml, UOP 300cc    Interval Events:  - afebrile, VSS  - PACU, laying comfortably  - H/H stable, cr trending down, making good urine  - send COVID PCR, started remdesivir tx  - Donor TB+, started INH/B6    Immunosuppression:  - Simulect induction  - ENV 7 in the am, MMF 1g BID, pred taper  - PPX: bactrim, valcyte, nystatin      Potential Discharge date: pending clinical course    Education:  Medications    Plan of care:  See Below    MEDICATIONS  (STANDING):  albuterol    90 MICROgram(s) HFA Inhaler 2 Puff(s) Inhalation every 6 hours  chlorhexidine 2% Cloths 1 Application(s) Topical daily  dextrose 5%. 1000 milliLiter(s) (50 mL/Hr) IV Continuous <Continuous>  dextrose 5%. 1000 milliLiter(s) (100 mL/Hr) IV Continuous <Continuous>  dextrose 50% Injectable 25 Gram(s) IV Push once  dextrose 50% Injectable 12.5 Gram(s) IV Push once  dextrose 50% Injectable 25 Gram(s) IV Push once  glucagon  Injectable 1 milliGRAM(s) IntraMuscular once  influenza  Vaccine (HIGH DOSE) 0.7 milliLiter(s) IntraMuscular once  insulin lispro (ADMELOG) corrective regimen sliding scale   SubCutaneous three times a day before meals  insulin lispro (ADMELOG) corrective regimen sliding scale   SubCutaneous at bedtime  isoniazid 300 milliGRAM(s) Oral daily  levothyroxine 125 MICROGram(s) Oral daily  multiple electrolytes Injection Type 1 1000 milliLiter(s) (50 mL/Hr) IV Continuous <Continuous>  multiple electrolytes Injection Type 1 1000 milliLiter(s) (70 mL/Hr) IV Continuous <Continuous>  polyethylene glycol 3350 17 Gram(s) Oral daily  pyridoxine 50 milliGRAM(s) Oral daily  remdesivir  IVPB   IV Intermittent   remdesivir  IVPB 200 milliGRAM(s) IV Intermittent every 24 hours  senna 2 Tablet(s) Oral at bedtime    MEDICATIONS  (PRN):  acetaminophen     Tablet .. 650 milliGRAM(s) Oral every 6 hours PRN Mild Pain (1 - 3)  dextrose Oral Gel 15 Gram(s) Oral once PRN Blood Glucose LESS THAN 70 milliGRAM(s)/deciliter  HYDROmorphone  Injectable 0.5 milliGRAM(s) IV Push every 10 minutes PRN Moderate Pain (4 - 6)  ondansetron Injectable 4 milliGRAM(s) IV Push every 6 hours PRN Nausea and/or Vomiting  ondansetron Injectable 4 milliGRAM(s) IV Push once PRN Nausea and/or Vomiting  traMADol 25 milliGRAM(s) Oral every 6 hours PRN Moderate Pain (4 - 6)  traMADol 50 milliGRAM(s) Oral every 8 hours PRN Severe Pain (7 - 10)      PAST MEDICAL & SURGICAL HISTORY:  Hard of hearing  Bilateral hearing Aids      Type 2 diabetes mellitus      HTN (hypertension)      Hypothyroidism      Osteomyelitis      ESRD on dialysis      S/P partial thyroidectomy  '95  benign      H/O laparoscopic adjustable gastric banding  ' 2010      S/P cataract extraction  '08 and ' 2011   Bilateral      History of foreign body aspiration  7/2018  from Left Ear      History of amputation of toe          Vital Signs Last 24 Hrs  T(C): 36 (25 Mar 2024 16:00), Max: 36.4 (25 Mar 2024 08:24)  T(F): 96.8 (25 Mar 2024 16:00), Max: 97.5 (25 Mar 2024 08:24)  HR: 113 (25 Mar 2024 18:30) (94 - 113)  BP: 134/64 (25 Mar 2024 18:30) (125/65 - 142/68)  BP(mean): 91 (25 Mar 2024 18:30) (88 - 98)  RR: 16 (25 Mar 2024 18:30) (16 - 17)  SpO2: 100% (25 Mar 2024 18:30) (100% - 100%)    Parameters below as of 25 Mar 2024 18:30  Patient On (Oxygen Delivery Method): nasal cannula  O2 Flow (L/min): 2      I&O's Summary    25 Mar 2024 07:01  -  25 Mar 2024 18:40  --------------------------------------------------------  IN: 895 mL / OUT: 975 mL / NET: -80 mL                              9.4    8.99  )-----------( 130      ( 25 Mar 2024 16:26 )             28.2     03-25    133<L>  |  92<L>  |  40<H>  ----------------------------<  163<H>  4.8   |  18<L>  |  7.35<H>    Ca    8.1<L>      25 Mar 2024 16:26  Phos  4.9     03-25  Mg     2.6     03-25    TPro  6.2  /  Alb  3.7  /  TBili  0.4  /  DBili  x   /  AST  23  /  ALT  22  /  AlkPhos  94  03-25      Review of systems  Gen: No weight changes, fatigue, fevers/chills, weakness  Skin: No rashes  Head/Eyes/Ears/Mouth: No headache; Normal hearing; Normal vision w/o blurriness; No sinus pain/discomfort, sore throat  Respiratory: No dyspnea, cough, wheezing, hemoptysis  CV: No chest pain, PND, orthopnea  GI: Mild abdominal pain at surgical incision site; denies diarrhea, constipation, nausea, vomiting, melena, hematochezia  : No increased frequency, dysuria, hematuria, nocturia  MSK: No joint pain/swelling; no back pain; no edema  Neuro: No dizziness/lightheadedness, weakness, seizures, numbness, tingling  Heme: No easy bruising or bleeding  Endo: No heat/cold intolerance  Psych: No significant nervousness, anxiety, stress, depression  All other systems were reviewed and are negative, except as noted.      PHYSICAL EXAM:  Constitutional: Well developed / well nourished  Eyes: Anicteric, PERRLA  ENMT: nc/at  Neck: supple  Respiratory: CTA B/L  Cardiovascular: RRR  Gastrointestinal: Soft, non distended, mild tenderness at the incision site; incision c/d/i; JPx1 ss  Genitourinary: Urinary catheter in place  Extremities: SCD's in place and working bilaterally, left forearm AVF  Vascular: Palpable dp pulses bilaterally  Neurological: A&O x3  Skin: no rashes, ulcerations or lesions;  Musculoskeletal: Moving all extremities  Psychiatric: Responsive

## 2024-03-25 NOTE — BRIEF OPERATIVE NOTE - COMMENTS
UNOS [id number]  Donor 33yo [employer]  Donor ABO A  Recipient ABO A  Donor CMV -, EBV +  Recipient CMV -, EBV +  Anatomy: single artery anastomosis (small upper pole artery ligated), single vein, single ureter. Standard anastomoses  Left kidney to right external iliac vessels.   +6Fr 12cm ureteral stent  Total ischemia time: 2 hours   Induction: Simulect and Solumedrol

## 2024-03-25 NOTE — PROGRESS NOTE ADULT - ASSESSMENT
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 Now s/p LDRT with Simulect induction 3/25/2024  Donor: ABO: A, CMV IgG: Neg,  EBV IgG:  Pos, Quant-TB:  Positive  Recipient: ABO: A, CMV IgG: Neg, EBV IgG: Pos, Quant-TB:  Neg, HLA/ CDC/FLOW: 2,2,2. No DSA. Auto CDC XM: B cell neg, T cell neg; Donor XM: T cell neg.    OR: Left Kidney -> Right Side, 2 arteries; only larger, lower artery utilized (small area of reduced flow to upper pole), 1 vein, 1 ureter. EBL 100ml, UOP 300cc      Plan:  [] LDRT with Simulect 3/25/2024  - Follow up official Renal US read  - Strict I/O's gerardo x1, luna  - IVF maintenance and replacement with normosol   - FU 11pm labs  - NPO, advance diet as tolerated  - pain control  - bowel regimen  - SCD/IS/ PT consult    [] Donor TB+  - start INH/B6 treatment  - TID following    []Covid+  - Repeat Covid PCR and check cycle threshold  - per TID start 5d of remdesivir    []Immunosuppression  - Simulect induction  - ENV 7 in the am, MMF 1g BID, pred taper  - PPX: bactrim, valcyte, nystatin

## 2024-03-26 LAB
A1C WITH ESTIMATED AVERAGE GLUCOSE RESULT: 5.2 % — SIGNIFICANT CHANGE UP (ref 4–5.6)
ADD ON TEST-SPECIMEN IN LAB: SIGNIFICANT CHANGE UP
ALBUMIN SERPL ELPH-MCNC: 2.4 G/DL — LOW (ref 3.3–5)
ALBUMIN SERPL ELPH-MCNC: 3.8 G/DL — SIGNIFICANT CHANGE UP (ref 3.3–5)
ALP SERPL-CCNC: 58 U/L — SIGNIFICANT CHANGE UP (ref 40–120)
ALP SERPL-CCNC: 91 U/L — SIGNIFICANT CHANGE UP (ref 40–120)
ALT FLD-CCNC: 16 U/L — SIGNIFICANT CHANGE UP (ref 10–45)
ALT FLD-CCNC: 23 U/L — SIGNIFICANT CHANGE UP (ref 10–45)
ANION GAP SERPL CALC-SCNC: 19 MMOL/L — HIGH (ref 5–17)
ANION GAP SERPL CALC-SCNC: 22 MMOL/L — HIGH (ref 5–17)
AST SERPL-CCNC: 18 U/L — SIGNIFICANT CHANGE UP (ref 10–40)
AST SERPL-CCNC: 31 U/L — SIGNIFICANT CHANGE UP (ref 10–40)
BASOPHILS # BLD AUTO: 0.01 K/UL — SIGNIFICANT CHANGE UP (ref 0–0.2)
BASOPHILS # BLD AUTO: 0.01 K/UL — SIGNIFICANT CHANGE UP (ref 0–0.2)
BASOPHILS NFR BLD AUTO: 0.1 % — SIGNIFICANT CHANGE UP (ref 0–2)
BASOPHILS NFR BLD AUTO: 0.2 % — SIGNIFICANT CHANGE UP (ref 0–2)
BILIRUB SERPL-MCNC: 0.2 MG/DL — SIGNIFICANT CHANGE UP (ref 0.2–1.2)
BILIRUB SERPL-MCNC: 0.4 MG/DL — SIGNIFICANT CHANGE UP (ref 0.2–1.2)
BUN SERPL-MCNC: 26 MG/DL — HIGH (ref 7–23)
BUN SERPL-MCNC: 31 MG/DL — HIGH (ref 7–23)
CALCIUM SERPL-MCNC: 6 MG/DL — CRITICAL LOW (ref 8.4–10.5)
CALCIUM SERPL-MCNC: 9.3 MG/DL — SIGNIFICANT CHANGE UP (ref 8.4–10.5)
CHLORIDE SERPL-SCNC: 95 MMOL/L — LOW (ref 96–108)
CHLORIDE SERPL-SCNC: 95 MMOL/L — LOW (ref 96–108)
CO2 SERPL-SCNC: 19 MMOL/L — LOW (ref 22–31)
CO2 SERPL-SCNC: 23 MMOL/L — SIGNIFICANT CHANGE UP (ref 22–31)
CREAT SERPL-MCNC: 3.41 MG/DL — HIGH (ref 0.5–1.3)
CREAT SERPL-MCNC: 3.82 MG/DL — HIGH (ref 0.5–1.3)
EGFR: 17 ML/MIN/1.73M2 — LOW
EGFR: 19 ML/MIN/1.73M2 — LOW
EOSINOPHIL # BLD AUTO: 0 K/UL — SIGNIFICANT CHANGE UP (ref 0–0.5)
EOSINOPHIL # BLD AUTO: 0 K/UL — SIGNIFICANT CHANGE UP (ref 0–0.5)
EOSINOPHIL NFR BLD AUTO: 0 % — SIGNIFICANT CHANGE UP (ref 0–6)
EOSINOPHIL NFR BLD AUTO: 0 % — SIGNIFICANT CHANGE UP (ref 0–6)
ESTIMATED AVERAGE GLUCOSE: 103 MG/DL — SIGNIFICANT CHANGE UP (ref 68–114)
GLUCOSE BLDC GLUCOMTR-MCNC: 135 MG/DL — HIGH (ref 70–99)
GLUCOSE BLDC GLUCOMTR-MCNC: 155 MG/DL — HIGH (ref 70–99)
GLUCOSE BLDC GLUCOMTR-MCNC: 189 MG/DL — HIGH (ref 70–99)
GLUCOSE BLDC GLUCOMTR-MCNC: 201 MG/DL — HIGH (ref 70–99)
GLUCOSE BLDC GLUCOMTR-MCNC: 209 MG/DL — HIGH (ref 70–99)
GLUCOSE BLDC GLUCOMTR-MCNC: 223 MG/DL — HIGH (ref 70–99)
GLUCOSE SERPL-MCNC: 116 MG/DL — HIGH (ref 70–99)
GLUCOSE SERPL-MCNC: 145 MG/DL — HIGH (ref 70–99)
HCT VFR BLD CALC: 20.8 % — CRITICAL LOW (ref 39–50)
HCT VFR BLD CALC: 23.6 % — LOW (ref 39–50)
HGB BLD-MCNC: 6.8 G/DL — CRITICAL LOW (ref 13–17)
HGB BLD-MCNC: 7.8 G/DL — LOW (ref 13–17)
IMM GRANULOCYTES NFR BLD AUTO: 0.8 % — SIGNIFICANT CHANGE UP (ref 0–0.9)
IMM GRANULOCYTES NFR BLD AUTO: 0.8 % — SIGNIFICANT CHANGE UP (ref 0–0.9)
LYMPHOCYTES # BLD AUTO: 0.28 K/UL — LOW (ref 1–3.3)
LYMPHOCYTES # BLD AUTO: 0.29 K/UL — LOW (ref 1–3.3)
LYMPHOCYTES # BLD AUTO: 3 % — LOW (ref 13–44)
LYMPHOCYTES # BLD AUTO: 4.7 % — LOW (ref 13–44)
MAGNESIUM SERPL-MCNC: 2.9 MG/DL — HIGH (ref 1.6–2.6)
MAGNESIUM SERPL-MCNC: 2.9 MG/DL — HIGH (ref 1.6–2.6)
MCHC RBC-ENTMCNC: 32.7 GM/DL — SIGNIFICANT CHANGE UP (ref 32–36)
MCHC RBC-ENTMCNC: 33.1 GM/DL — SIGNIFICANT CHANGE UP (ref 32–36)
MCHC RBC-ENTMCNC: 34.7 PG — HIGH (ref 27–34)
MCHC RBC-ENTMCNC: 35.1 PG — HIGH (ref 27–34)
MCV RBC AUTO: 106.1 FL — HIGH (ref 80–100)
MCV RBC AUTO: 106.3 FL — HIGH (ref 80–100)
MONOCYTES # BLD AUTO: 0.22 K/UL — SIGNIFICANT CHANGE UP (ref 0–0.9)
MONOCYTES # BLD AUTO: 0.34 K/UL — SIGNIFICANT CHANGE UP (ref 0–0.9)
MONOCYTES NFR BLD AUTO: 2.3 % — SIGNIFICANT CHANGE UP (ref 2–14)
MONOCYTES NFR BLD AUTO: 5.7 % — SIGNIFICANT CHANGE UP (ref 2–14)
NEUTROPHILS # BLD AUTO: 5.24 K/UL — SIGNIFICANT CHANGE UP (ref 1.8–7.4)
NEUTROPHILS # BLD AUTO: 9.01 K/UL — HIGH (ref 1.8–7.4)
NEUTROPHILS NFR BLD AUTO: 88.6 % — HIGH (ref 43–77)
NEUTROPHILS NFR BLD AUTO: 93.8 % — HIGH (ref 43–77)
NRBC # BLD: 0 /100 WBCS — SIGNIFICANT CHANGE UP (ref 0–0)
NRBC # BLD: 0 /100 WBCS — SIGNIFICANT CHANGE UP (ref 0–0)
PHOSPHATE SERPL-MCNC: 4.5 MG/DL — SIGNIFICANT CHANGE UP (ref 2.5–4.5)
PHOSPHATE SERPL-MCNC: 5.3 MG/DL — HIGH (ref 2.5–4.5)
PLATELET # BLD AUTO: 138 K/UL — LOW (ref 150–400)
PLATELET # BLD AUTO: 95 K/UL — LOW (ref 150–400)
POTASSIUM SERPL-MCNC: 4.5 MMOL/L — SIGNIFICANT CHANGE UP (ref 3.5–5.3)
POTASSIUM SERPL-MCNC: 4.7 MMOL/L — SIGNIFICANT CHANGE UP (ref 3.5–5.3)
POTASSIUM SERPL-SCNC: 4.5 MMOL/L — SIGNIFICANT CHANGE UP (ref 3.5–5.3)
POTASSIUM SERPL-SCNC: 4.7 MMOL/L — SIGNIFICANT CHANGE UP (ref 3.5–5.3)
PROT SERPL-MCNC: 4.2 G/DL — LOW (ref 6–8.3)
PROT SERPL-MCNC: 7 G/DL — SIGNIFICANT CHANGE UP (ref 6–8.3)
RBC # BLD: 1.96 M/UL — LOW (ref 4.2–5.8)
RBC # BLD: 2.22 M/UL — LOW (ref 4.2–5.8)
RBC # FLD: 14.5 % — SIGNIFICANT CHANGE UP (ref 10.3–14.5)
RBC # FLD: 14.5 % — SIGNIFICANT CHANGE UP (ref 10.3–14.5)
SARS-COV-2 RNA SPEC QL NAA+PROBE: SIGNIFICANT CHANGE UP
SODIUM SERPL-SCNC: 136 MMOL/L — SIGNIFICANT CHANGE UP (ref 135–145)
SODIUM SERPL-SCNC: 137 MMOL/L — SIGNIFICANT CHANGE UP (ref 135–145)
WBC # BLD: 5.92 K/UL — SIGNIFICANT CHANGE UP (ref 3.8–10.5)
WBC # BLD: 9.61 K/UL — SIGNIFICANT CHANGE UP (ref 3.8–10.5)
WBC # FLD AUTO: 5.92 K/UL — SIGNIFICANT CHANGE UP (ref 3.8–10.5)
WBC # FLD AUTO: 9.61 K/UL — SIGNIFICANT CHANGE UP (ref 3.8–10.5)

## 2024-03-26 PROCEDURE — 99223 1ST HOSP IP/OBS HIGH 75: CPT

## 2024-03-26 PROCEDURE — 99223 1ST HOSP IP/OBS HIGH 75: CPT | Mod: GC

## 2024-03-26 RX ADMIN — TACROLIMUS 7 MILLIGRAM(S): 5 CAPSULE ORAL at 07:59

## 2024-03-26 RX ADMIN — MYCOPHENOLATE MOFETIL 1 GRAM(S): 250 CAPSULE ORAL at 07:59

## 2024-03-26 RX ADMIN — Medication 300 MILLIGRAM(S): at 11:30

## 2024-03-26 RX ADMIN — Medication 500000 UNIT(S): at 17:45

## 2024-03-26 RX ADMIN — Medication 500000 UNIT(S): at 02:06

## 2024-03-26 RX ADMIN — TRAMADOL HYDROCHLORIDE 25 MILLIGRAM(S): 50 TABLET ORAL at 15:51

## 2024-03-26 RX ADMIN — Medication 125 MILLIGRAM(S): at 05:33

## 2024-03-26 RX ADMIN — POLYETHYLENE GLYCOL 3350 17 GRAM(S): 17 POWDER, FOR SOLUTION ORAL at 11:30

## 2024-03-26 RX ADMIN — CHLORHEXIDINE GLUCONATE 1 APPLICATION(S): 213 SOLUTION TOPICAL at 11:31

## 2024-03-26 RX ADMIN — MYCOPHENOLATE MOFETIL 1 GRAM(S): 250 CAPSULE ORAL at 20:37

## 2024-03-26 RX ADMIN — TRAMADOL HYDROCHLORIDE 50 MILLIGRAM(S): 50 TABLET ORAL at 12:50

## 2024-03-26 RX ADMIN — TRAMADOL HYDROCHLORIDE 25 MILLIGRAM(S): 50 TABLET ORAL at 15:21

## 2024-03-26 RX ADMIN — Medication 125 MILLIGRAM(S): at 17:45

## 2024-03-26 RX ADMIN — Medication 500000 UNIT(S): at 05:33

## 2024-03-26 RX ADMIN — TRAMADOL HYDROCHLORIDE 25 MILLIGRAM(S): 50 TABLET ORAL at 08:35

## 2024-03-26 RX ADMIN — TRAMADOL HYDROCHLORIDE 25 MILLIGRAM(S): 50 TABLET ORAL at 08:05

## 2024-03-26 RX ADMIN — FAMOTIDINE 20 MILLIGRAM(S): 10 INJECTION INTRAVENOUS at 11:30

## 2024-03-26 RX ADMIN — TRAMADOL HYDROCHLORIDE 50 MILLIGRAM(S): 50 TABLET ORAL at 02:09

## 2024-03-26 RX ADMIN — Medication 2: at 17:45

## 2024-03-26 RX ADMIN — TRAMADOL HYDROCHLORIDE 50 MILLIGRAM(S): 50 TABLET ORAL at 03:09

## 2024-03-26 RX ADMIN — Medication 1 TABLET(S): at 11:30

## 2024-03-26 RX ADMIN — VALGANCICLOVIR 450 MILLIGRAM(S): 450 TABLET, FILM COATED ORAL at 11:31

## 2024-03-26 RX ADMIN — SENNA PLUS 2 TABLET(S): 8.6 TABLET ORAL at 20:38

## 2024-03-26 RX ADMIN — Medication 50 MILLIGRAM(S): at 11:30

## 2024-03-26 RX ADMIN — Medication 125 MICROGRAM(S): at 20:41

## 2024-03-26 RX ADMIN — Medication 500000 UNIT(S): at 11:29

## 2024-03-26 RX ADMIN — TRAMADOL HYDROCHLORIDE 50 MILLIGRAM(S): 50 TABLET ORAL at 12:20

## 2024-03-26 RX ADMIN — TRAMADOL HYDROCHLORIDE 50 MILLIGRAM(S): 50 TABLET ORAL at 20:42

## 2024-03-26 RX ADMIN — Medication 4: at 13:28

## 2024-03-26 NOTE — CONSULT NOTE ADULT - ATTENDING COMMENTS
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   CMV D-/R+, EBV D+/R+,  HLA mismatch 2, 2, 2. No DSA.     Pt had asymptomatic COVID +ve infection on pre op testing, started remdesivir.   Donor has latent TB, recipient started on INH/B6   Doing well, good UOP.   Pain well controlled  Hemodynamically stable     S/p LURD transplant 3/25/24   Good graft function, Cr falling, Post op US reviewed    Anemia - multifactorial - CKD, surgery, medications. Repeat H/H. Scan if still low     Immunosuppression   Simulect induction, steroid taper per protocol    Envarsus 7mg po daily start today - aim for trough 8-10   MMF 1 gram po bid     Infection prophylaxis   Nystatin, bactrim ss daily, valcyte 450mg daily   INH/B6 for donor with latent TB   Remdesivir per ID for COIVD 19 +ve PCR on pre op testing     Diabetes II- continue admelog sliding scale   HTN and h/o arrythmias - was on metoprolol at home, resume if SBP > 150 systolic   Hypothyroidism - continue synthroid

## 2024-03-26 NOTE — CONSULT NOTE ADULT - SUBJECTIVE AND OBJECTIVE BOX
Patient is a 68y old  Male who presents with a chief complaint of LDRT (25 Mar 2024 18:38)      HPI:  68 year old male with  ESRD on HD since 06/2022 on M/W/F ( via left forearm AV fistula ). Presents for scheduled Open living kidney transplant recipient to the right side, possible left on 03/25/24.      PMH of obesity , proteinuria related to obesity, kidney failure diagnosed in 1981, DM2 since 1992, ESRD on HD since 06/2022 on M/W/F ( via left forearm AV fistula ), neuropathy, scoliosis, KRYSTIAN prior to weight loss from  lab band insertion in 2010(currently in deflated position since foot surgery in 2019) & Patient had CT in 11/22 with ascites and pericardial effusion &  s/p pericardiocentesis, follow up  Labs from 7/2023 showed elevated AST/ALT on GI follow up. Cause of Kidney Failure: Obesity     (14 Mar 2024 15:08)     prior hospital charts reviewed [  ]  primary team notes reviewed [  ]  other consultant notes reviewed [  ]    PAST MEDICAL & SURGICAL HISTORY:  Hard of hearing  Bilateral hearing Aids      Type 2 diabetes mellitus      HTN (hypertension)      Hypothyroidism      Osteomyelitis      ESRD on dialysis      S/P partial thyroidectomy  '95  benign      H/O laparoscopic adjustable gastric banding  ' 2010      S/P cataract extraction  '08 and ' 2011   Bilateral      History of foreign body aspiration  7/2018  from Left Ear      History of amputation of toe          Allergies  capsicum (Unknown)  Novocain (Other)  Vasotec (Unknown)  MetFORMIN Hydrochloride (Unknown)  aspirin (Unknown)  Enalapril Maleate (Unknown)  Enalaprilat (Unknown)    ANTIMICROBIALS (past 90 days)  MEDICATIONS  (STANDING):    isoniazid   300 milliGRAM(s) Oral (03-26-24 @ 11:30)    nystatin    Suspension   [id number] Unit(s) Swish and Swallow (03-26-24 @ 11:29)   [id number] Unit(s) Swish and Swallow (03-26-24 @ 05:33)   [id number] Unit(s) Swish and Swallow (03-26-24 @ 02:06)    remdesivir  IVPB   200 milliGRAM(s) IV Intermittent (03-25-24 @ 19:30)    trimethoprim   80 mG/sulfamethoxazole 400 mG   1 Tablet(s) Oral (03-26-24 @ 11:30)    valGANciclovir   450 milliGRAM(s) Oral (03-26-24 @ 11:31)      ANTIMICROBIALS:    isoniazid 300 daily  nystatin    Suspension [id number] four times a day  trimethoprim   80 mG/sulfamethoxazole 400 mG 1 daily  valGANciclovir 450 daily    OTHER MEDS: MEDICATIONS  (STANDING):  acetaminophen     Tablet .. 650 every 6 hours PRN  albuterol    90 MICROgram(s) HFA Inhaler 2 every 6 hours  dextrose 50% Injectable 25 once  dextrose 50% Injectable 12.5 once  dextrose 50% Injectable 25 once  dextrose Oral Gel 15 once PRN  famotidine    Tablet 20 daily  glucagon  Injectable 1 once  influenza  Vaccine (HIGH DOSE) 0.7 once  insulin lispro (ADMELOG) corrective regimen sliding scale  three times a day before meals  insulin lispro (ADMELOG) corrective regimen sliding scale  at bedtime  levothyroxine 125 daily  methylPREDNISolone sodium succinate Injectable    methylPREDNISolone sodium succinate Injectable 125 two times a day  mycophenolate mofetil 1 every 12 hours  ondansetron Injectable 4 every 6 hours PRN  polyethylene glycol 3350 17 daily  senna 2 at bedtime  tacrolimus ER Tablet (ENVARSUS XR) 7 <User Schedule>  traMADol 25 every 6 hours PRN  traMADol 50 every 8 hours PRN    SOCIAL HISTORY:   hx smoking  non-smoker    FAMILY HISTORY:  Family history of atherosclerosis (Mother)      REVIEW OF SYSTEMS  [  ] ROS unobtainable because:    [  ] All other systems negative except as noted below:	    Constitutional:  [ ] fever [ ] chills  [ ] weight loss  [ ] weakness  Skin:  [ ] rash [ ] phlebitis	  Eyes: [ ] icterus [ ] pain  [ ] discharge	  ENMT: [ ] sore throat  [ ] thrush [ ] ulcers [ ] exudates  Respiratory: [ ] dyspnea [ ] hemoptysis [ ] cough [ ] sputum	  Cardiovascular:  [ ] chest pain [ ] palpitations [ ] edema	  Gastrointestinal:  [ ] nausea [ ] vomiting [ ] diarrhea [ ] constipation [ ] pain	  Genitourinary:  [ ] dysuria [ ] frequency [ ] hematuria [ ] discharge [ ] flank pain  [ ] incontinence  Musculoskeletal:  [ ] myalgias [ ] arthralgias [ ] arthritis  [ ] back pain  Neurological:  [ ] headache [ ] seizures  [ ] confusion/altered mental status  Psychiatric:  [ ] anxiety [ ] depression	  Hematology/Lymphatics:  [ ] lymphadenopathy  Endocrine:  [ ] adrenal [ ] thyroid  Allergic/Immunologic:	 [ ] transplant [ ] seasonal    Vital Signs Last 24 Hrs  T(F): 98.2 (03-26-24 @ 13:00), Max: 98.2 (03-21-24 @ 11:37)  Vital Signs Last 24 Hrs  HR: 71 (03-26-24 @ 13:00) (67 - 116)  BP: 145/77 (03-26-24 @ 13:00) (102/57 - 145/77)  RR: 18 (03-26-24 @ 13:00)  SpO2: 100% (03-26-24 @ 13:00) (94% - 100%)  Wt(kg): --    PHYSICAL EXAMINATION:  General: Alert and Awake, NAD  HEENT: PERRL, EOMI, No subconjunctival hemorrhages, Oropharynx Clear, MMM  Neck: Supple, No GEO  Cardiac: RRR, No M/R/G  Resp: CTAB, No Wh/Rh/Ra  Abdomen: NBS, NT/ND, No HSM, No rigidity or guarding  MSK: No LE edema. No stigmata of IE. No evidence of phlebitis. No evidence of synovitis.  : No luna  Skin: No rashes or lesions. Skin is warm and dry to the touch.   Neuro: Alert and Awake. CN 2-12 Grossly intact. Moves all four extremities spontaneously.  Psych: Calm, Pleasant, Cooperative                          7.8    9.61  )-----------( 138      ( 26 Mar 2024 09:00 )             23.6     03-26    137  |  95<L>  |  31<H>  ----------------------------<  145<H>  4.5   |  23  |  3.82<H>    Ca    9.3      26 Mar 2024 09:00  Phos  5.3     03-26  Mg     2.9     03-26    TPro  7.0  /  Alb  3.8  /  TBili  0.4  /  DBili  x   /  AST  31  /  ALT  23  /  AlkPhos  91  03-26    Urinalysis Basic - ( 26 Mar 2024 09:00 )    Color: x / Appearance: x / SG: x / pH: x  Gluc: 145 mg/dL / Ketone: x  / Bili: x / Urobili: x   Blood: x / Protein: x / Nitrite: x   Leuk Esterase: x / RBC: x / WBC x   Sq Epi: x / Non Sq Epi: x / Bacteria: x    MICROBIOLOGY:                RADIOLOGY:    <The imaging below has been reviewed and visualized by me independently. Findings as detailed in report below>     Patient is a 68y old  Male who presents with a chief complaint of LDRT (25 Mar 2024 18:38)    HPI:    68 year old male with  ESRD on HD since 06/2022 on M/W/F ( via left forearm AV fistula), DM2, neuropathy, scoliosis, MASH/MASLD who presents for planned directed living donor donation. s/p living donor renal transplant 3/25/24.     prior hospital charts reviewed [  ]  primary team notes reviewed [  ]  other consultant notes reviewed [  ]    PAST MEDICAL & SURGICAL HISTORY:  Hard of hearing  Bilateral hearing Aids      Type 2 diabetes mellitus      HTN (hypertension)      Hypothyroidism      Osteomyelitis      ESRD on dialysis      S/P partial thyroidectomy  '95  benign      H/O laparoscopic adjustable gastric banding  ' 2010      S/P cataract extraction  '08 and ' 2011   Bilateral      History of foreign body aspiration  7/2018  from Left Ear      History of amputation of toe          Allergies  capsicum (Unknown)  Novocain (Other)  Vasotec (Unknown)  MetFORMIN Hydrochloride (Unknown)  aspirin (Unknown)  Enalapril Maleate (Unknown)  Enalaprilat (Unknown)    ANTIMICROBIALS (past 90 days)  MEDICATIONS  (STANDING):    isoniazid   300 milliGRAM(s) Oral (03-26-24 @ 11:30)    nystatin    Suspension   [id number] Unit(s) Swish and Swallow (03-26-24 @ 11:29)   [id number] Unit(s) Swish and Swallow (03-26-24 @ 05:33)   [id number] Unit(s) Swish and Swallow (03-26-24 @ 02:06)    remdesivir  IVPB   200 milliGRAM(s) IV Intermittent (03-25-24 @ 19:30)    trimethoprim   80 mG/sulfamethoxazole 400 mG   1 Tablet(s) Oral (03-26-24 @ 11:30)    valGANciclovir   450 milliGRAM(s) Oral (03-26-24 @ 11:31)      ANTIMICROBIALS:    isoniazid 300 daily  nystatin    Suspension [id number] four times a day  trimethoprim   80 mG/sulfamethoxazole 400 mG 1 daily  valGANciclovir 450 daily    OTHER MEDS: MEDICATIONS  (STANDING):  acetaminophen     Tablet .. 650 every 6 hours PRN  albuterol    90 MICROgram(s) HFA Inhaler 2 every 6 hours  dextrose 50% Injectable 25 once  dextrose 50% Injectable 12.5 once  dextrose 50% Injectable 25 once  dextrose Oral Gel 15 once PRN  famotidine    Tablet 20 daily  glucagon  Injectable 1 once  influenza  Vaccine (HIGH DOSE) 0.7 once  insulin lispro (ADMELOG) corrective regimen sliding scale  three times a day before meals  insulin lispro (ADMELOG) corrective regimen sliding scale  at bedtime  levothyroxine 125 daily  methylPREDNISolone sodium succinate Injectable    methylPREDNISolone sodium succinate Injectable 125 two times a day  mycophenolate mofetil 1 every 12 hours  ondansetron Injectable 4 every 6 hours PRN  polyethylene glycol 3350 17 daily  senna 2 at bedtime  tacrolimus ER Tablet (ENVARSUS XR) 7 <User Schedule>  traMADol 25 every 6 hours PRN  traMADol 50 every 8 hours PRN    SOCIAL HISTORY: no active smoking or etoh use.     FAMILY HISTORY:  Family history of atherosclerosis (Mother)      REVIEW OF SYSTEMS  [  ] ROS unobtainable because:    [ x ] All other systems negative except as noted below:	    Constitutional:  [ ] fever [ ] chills  [ ] weight loss  [ ] weakness  Skin:  [ ] rash [ ] phlebitis	  Eyes: [ ] icterus [ ] pain  [ ] discharge	  ENMT: [ ] sore throat  [ ] thrush [ ] ulcers [ ] exudates  Respiratory: [ ] dyspnea [ ] hemoptysis [ ] cough [ ] sputum	  Cardiovascular:  [ ] chest pain [ ] palpitations [ ] edema	  Gastrointestinal:  [ ] nausea [ ] vomiting [ ] diarrhea [ ] constipation [ ] pain	  Genitourinary:  [ ] dysuria [ ] frequency [ ] hematuria [ ] discharge [ ] flank pain  [ ] incontinence  Musculoskeletal:  [ ] myalgias [ ] arthralgias [ ] arthritis  [ ] back pain  Neurological:  [ ] headache [ ] seizures  [ ] confusion/altered mental status  Psychiatric:  [ ] anxiety [ ] depression	  Hematology/Lymphatics:  [ ] lymphadenopathy  Endocrine:  [ ] adrenal [ ] thyroid  Allergic/Immunologic:	 [ ] transplant [ ] seasonal    Vital Signs Last 24 Hrs  T(F): 98.2 (03-26-24 @ 13:00), Max: 98.2 (03-21-24 @ 11:37)  Vital Signs Last 24 Hrs  HR: 71 (03-26-24 @ 13:00) (67 - 116)  BP: 145/77 (03-26-24 @ 13:00) (102/57 - 145/77)  RR: 18 (03-26-24 @ 13:00)  SpO2: 100% (03-26-24 @ 13:00) (94% - 100%)  Wt(kg): --    PHYSICAL EXAMINATION:  General: Alert and Awake, NAD  HEENT: PERRL, EOMI, No subconjunctival hemorrhages, Oropharynx Clear, MMM  Neck: Supple, No GEO  Cardiac: RRR, No M/R/G  Resp: CTAB, No Wh/Rh/Ra  Abdomen: +Dressing over RLQ Abdomen with drain with SS output. NBS, NT/ND, No HSM, No rigidity or guarding  MSK: No LE edema. No stigmata of IE. No evidence of phlebitis. No evidence of synovitis.  : +luna  Skin: No rashes or lesions. Skin is warm and dry to the touch.   Neuro: Alert and Awake. CN 2-12 Grossly intact. Moves all four extremities spontaneously.  Psych: Calm, Pleasant, Cooperative                          7.8    9.61  )-----------( 138      ( 26 Mar 2024 09:00 )             23.6     03-26    137  |  95<L>  |  31<H>  ----------------------------<  145<H>  4.5   |  23  |  3.82<H>    Ca    9.3      26 Mar 2024 09:00  Phos  5.3     03-26  Mg     2.9     03-26    TPro  7.0  /  Alb  3.8  /  TBili  0.4  /  DBili  x   /  AST  31  /  ALT  23  /  AlkPhos  91  03-26    Urinalysis Basic - ( 26 Mar 2024 09:00 )    Color: x / Appearance: x / SG: x / pH: x  Gluc: 145 mg/dL / Ketone: x  / Bili: x / Urobili: x   Blood: x / Protein: x / Nitrite: x   Leuk Esterase: x / RBC: x / WBC x   Sq Epi: x / Non Sq Epi: x / Bacteria: x    RADIOLOGY:    <The imaging below has been reviewed and visualized by me independently. Findings as detailed in report below>    < from: US Trans Kidney w/ Doppler, Right (03.25.24 @ 18:25) >  IMPRESSION:  Mildly elevated velocities at the transplant artery anastomosis;   continued follow-up is recommended.    < end of copied text >

## 2024-03-26 NOTE — PROVIDER CONTACT NOTE (CRITICAL VALUE NOTIFICATION) - ASSESSMENT
A/Ox4, VS as charted, no active signs of bleeding, per night RN blood drawn above IV site/IV fluids paused however may be inaccurate
A/Ox4, VS as charted, per night RN blood drawn above IV site/IV fluids paused however may be inaccurate

## 2024-03-26 NOTE — CONSULT NOTE ADULT - SUBJECTIVE AND OBJECTIVE BOX
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 Now s/p LDRT with Simulect induction 3/25/2024  Donor: ABO: A, CMV IgG: Neg,  EBV IgG:  Pos, Quant-TB:  Positive  Recipient: ABO: A, CMV IgG: Neg, EBV IgG: Pos, Quant-TB:  Neg, HLA/ CDC/FLOW: 2,2,2. No DSA. Auto CDC XM: B cell neg, T cell neg; Donor XM: T cell neg.    Pt seen and examined this AM. Pt reports doing well, endorses fatigue mild abd pain at incision site. Otherwise no complaints. Denies any headaches, nausea, vomiting, fevers/chills, chest pain, SOB, and leg swelling.    PAST HISTORY  --------------------------------------------------------------------------------  PAST MEDICAL & SURGICAL HISTORY:  Hard of hearing  Bilateral hearing Aids  Type 2 diabetes mellitus  HTN (hypertension)  Hypothyroidism  Osteomyelitis  ESRD on dialysis  S/P partial thyroidectomy  '95  benign  H/O laparoscopic adjustable gastric banding  ' 2010  S/P cataract extraction  '08 and ' 2011   Bilateral  History of foreign body aspiration  7/2018  from Left Ear  History of amputation of toe    FAMILY HISTORY:  Family history of atherosclerosis (Mother)    Social History: n/a    ALLERGIES & MEDICATIONS  --------------------------------------------------------------------------------  Allergies    capsicum (Unknown)  Novocain (Other)  Vasotec (Unknown)  MetFORMIN Hydrochloride (Unknown)  aspirin (Unknown)  Enalapril Maleate (Unknown)  Enalaprilat (Unknown)    Intolerances    Standing Inpatient Medications  albuterol    90 MICROgram(s) HFA Inhaler 2 Puff(s) Inhalation every 6 hours  chlorhexidine 2% Cloths 1 Application(s) Topical daily  dextrose 5%. 1000 milliLiter(s) IV Continuous <Continuous>  dextrose 5%. 1000 milliLiter(s) IV Continuous <Continuous>  dextrose 50% Injectable 25 Gram(s) IV Push once  dextrose 50% Injectable 12.5 Gram(s) IV Push once  dextrose 50% Injectable 25 Gram(s) IV Push once  famotidine    Tablet 20 milliGRAM(s) Oral daily  glucagon  Injectable 1 milliGRAM(s) IntraMuscular once  influenza  Vaccine (HIGH DOSE) 0.7 milliLiter(s) IntraMuscular once  insulin lispro (ADMELOG) corrective regimen sliding scale   SubCutaneous three times a day before meals  insulin lispro (ADMELOG) corrective regimen sliding scale   SubCutaneous at bedtime  isoniazid 300 milliGRAM(s) Oral daily  levothyroxine 125 MICROGram(s) Oral daily  methylPREDNISolone sodium succinate Injectable   IV Push   methylPREDNISolone sodium succinate Injectable 125 milliGRAM(s) IV Push two times a day  multiple electrolytes Injection Type 1 1000 milliLiter(s) IV Continuous <Continuous>  mycophenolate mofetil 1 Gram(s) Oral every 12 hours  nystatin    Suspension [id number] Unit(s) Swish and Swallow four times a day  polyethylene glycol 3350 17 Gram(s) Oral daily  pyridoxine 50 milliGRAM(s) Oral daily  remdesivir  IVPB   IV Intermittent   remdesivir  IVPB 100 milliGRAM(s) IV Intermittent every 24 hours  senna 2 Tablet(s) Oral at bedtime  tacrolimus ER Tablet (ENVARSUS XR) 7 milliGRAM(s) Oral <User Schedule>  trimethoprim   80 mG/sulfamethoxazole 400 mG 1 Tablet(s) Oral daily  valGANciclovir 450 milliGRAM(s) Oral daily    PRN Inpatient Medications  acetaminophen     Tablet .. 650 milliGRAM(s) Oral every 6 hours PRN  dextrose Oral Gel 15 Gram(s) Oral once PRN  ondansetron Injectable 4 milliGRAM(s) IV Push every 6 hours PRN  traMADol 25 milliGRAM(s) Oral every 6 hours PRN  traMADol 50 milliGRAM(s) Oral every 8 hours PRN    REVIEW OF SYSTEMS  --------------------------------------------------------------------------------  as per HPI    VITALS/PHYSICAL EXAM  --------------------------------------------------------------------------------  T(C): 36.7 (03-26-24 @ 09:00), Max: 36.7 (03-26-24 @ 03:00)  HR: 67 (03-26-24 @ 09:00) (67 - 116)  BP: 145/76 (03-26-24 @ 09:00) (102/57 - 145/76)  RR: 18 (03-26-24 @ 09:00) (16 - 20)  SpO2: 100% (03-26-24 @ 09:00) (94% - 100%)  Wt(kg): --  Height (cm): 176.5 (03-25-24 @ 08:24)  Weight (kg): 91.898 (03-25-24 @ 08:24)  BMI (kg/m2): 29.5 (03-25-24 @ 08:24)  BSA (m2): 2.09 (03-25-24 @ 08:24)    03-25-24 @ 07:01  -  03-26-24 @ 07:00  --------------------------------------------------------  IN: 5405 mL / OUT: 5425 mL / NET: -20 mL    03-26-24 @ 07:01  -  03-26-24 @ 11:40  --------------------------------------------------------  IN: 480 mL / OUT: 320 mL / NET: 160 mL    Physical Exam:  General: NAD  HEENT: Anicteric, PERRLA, MMM  Respiratory: CTA B/L  Cardiovascular: RRR  Gastrointestinal: Soft, non distended  Transplant: mild tenderness at the incision site; incision c/d/i; MARKY with seroussanguinous discharge   : Arenas catheter  Neurological: Awake  MSK: no edema   Vascular access: +LUE AVF with thrill   Neurological: A&O x3  Skin: no rashes, warm    LABS/STUDIES  --------------------------------------------------------------------------------              7.8    9.61  >-----------<  138      [03-26-24 @ 09:00]              23.6     137  |  95  |  31  ----------------------------<  145      [03-26-24 @ 09:00]  4.5   |  23  |  3.82        Ca     9.3     [03-26-24 @ 09:00]      Mg     2.9     [03-26-24 @ 06:48]      Phos  4.5     [03-26-24 @ 06:48]    TPro  7.0  /  Alb  3.8  /  TBili  0.4  /  DBili  x   /  AST  31  /  ALT  23  /  AlkPhos  91  [03-26-24 @ 09:00]    PT/INR: PT 10.8 , INR 0.98       [03-25-24 @ 17:24]  PTT: 25.0       [03-25-24 @ 17:24]    Creatinine Trend:  SCr 3.82 [03-26 @ 09:00]  SCr 3.41 [03-26 @ 06:48]  SCr 5.92 [03-25 @ 22:10]  SCr 7.35 [03-25 @ 16:26]  SCr 8.26 [03-25 @ 07:37]    Urinalysis - [03-26-24 @ 09:00]      Color  / Appearance  / SG  / pH       Gluc 145 / Ketone   / Bili  / Urobili        Blood  / Protein  / Leuk Est  / Nitrite       RBC  / WBC  / Hyaline  / Gran  / Sq Epi  / Non Sq Epi  / Bacteria     HbA1c 7.3      [06-04-19 @ 19:02]    HBsAb <3.0      [03-25-24 @ 07:37]  HBsAg Nonreact      [03-25-24 @ 07:37]  HBcAb Nonreact      [03-25-24 @ 07:37]  HCV 0.06, Nonreact      [03-25-24 @ 07:37]  HIV Nonreact      [03-25-24 @ 07:37]    Tacrolimus  Cyclosporine  Sirolimus  Mycophenolate  BK PCR  CMV PCR  Parvo PCR  EBV PCR

## 2024-03-26 NOTE — PROGRESS NOTE ADULT - ASSESSMENT
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 Now s/p LDRT with Simulect induction 3/25/2024  Donor: ABO: A, CMV IgG: Neg,  EBV IgG:  Pos, Quant-TB:  Positive  Recipient: ABO: A, CMV IgG: Neg, EBV IgG: Pos, Quant-TB:  Neg, HLA/ CDC/FLOW: 2,2,2. No DSA. Auto CDC XM: B cell neg, T cell neg; Donor XM: T cell neg.    OR: Left Kidney -> Right Side, 2 arteries; only larger, lower artery utilized (small area of reduced flow to upper pole), 1 vein, 1 ureter. EBL 100ml, UOP 300cc      Plan:  [] LDRT with Simulect 3/25/2024  - Follow up official Renal US read  - Strict I/O's gerardo x1, luna  - IVF maintenance and replacement with normosol   - FU 11pm labs  - NPO, advance diet as tolerated  - pain control  - bowel regimen  - SCD/IS/ PT consult    [] Donor TB+  - start INH/B6 treatment  - TID following    []Covid+  - Repeat Covid PCR and check cycle threshold  - per TID start 5d of remdesivir    []Immunosuppression  - Simulect induction  - ENV 7 in the am, MMF 1g BID, pred taper  - PPX: bactrim, valcyte, nystatin 69 y/o M with past medical history significant for PMH of obesity, proteinuria related to obesity, kidney failure diagnosed in 1981, DM2 since 1992, neuropathy, scoliosis, KRYSTIAN prior to weight loss from  lab band insertion in 2010 (currently in deflated position since foot surgery in 2019), pericardial effusion (s/p pericardiocentesis), 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 Now s/p LDRT with Simulect induction 3/25/2024    Plan:  LDRT with Simulect 3/25/2024  - Renal US 3/25 w/ patent vessels, mildly elevated velocities at arterial anastomosis, and no significant renal artery stenosis  - Strict I/O's gerardo x1, luna  - IVF maintenance Normosol, will d/c IVF replacements  - Will advance to regular diet  - pain control  - bowel regimen  - SCD/IS/ PT consult    Donor TB+  - INH/B6 treatment  - Transplant ID following    COVID+ on 3/23  - Repeat COVID PCR and check cycle threshold  - per transplant ID, on 5 days of remdesivir post-op    Immunosuppression  - Simulect induction  - Envarsus by level, MMF 1g BID, prednisone taper  - PPX: Bactrim, Valcyte, nystatin 67 y/o M with past medical history significant for PMH of obesity, proteinuria related to obesity, kidney failure diagnosed in 1981, DM2 since 1992, neuropathy, scoliosis, KRYSTIAN prior to weight loss from  lab band insertion in 2010 (currently in deflated position since foot surgery in 2019), pericardial effusion (s/p pericardiocentesis), 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 Now s/p LDRT with Simulect induction 3/25/2024    Plan:  LDRT with Simulect 3/25/2024  - Renal US 3/25 w/ patent vessels, mildly elevated velocities at arterial anastomosis, and no significant renal artery stenosis  - Strict I/O's gerardo x1, luna  - IVF maintenance Normosol, will d/c IVF replacements  - Will advance to regular diet  - pain control  - bowel regimen  - SCD/IS/ PT consult    Donor TB+  - INH/B6 treatment  - Transplant ID following    COVID+ on 3/23  - Repeat COVID PCR negative on 3/25, will send repeat COVID-19 PCR and d/c remdesivir      Immunosuppression  - Simulect induction  - Envarsus by level, MMF 1g BID, prednisone taper  - PPX: Bactrim, Valcyte, nystatin

## 2024-03-26 NOTE — PHYSICAL THERAPY INITIAL EVALUATION ADULT - GAIT DEVIATIONS NOTED, PT EVAL
crouch/decreased misael/increased time in double stance/decreased step length/decreased stride length/decreased weight-shifting ability

## 2024-03-26 NOTE — CONSULT NOTE ADULT - ASSESSMENT
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 Now s/p LDRT with Simulect induction 3/25/2024    1. s/p LDRT with Simulect induction 3/25/2024  - Pt with improving renal function and 24hr UOP ~5L. No further indication for HD.   - Post op US w/ elevated velocities otherwise patent vessels  - Avoid hypotension, keep SYS BP >130  - Monito labs, UOP and VS    2. Immunosuppression:  - Simulect induction  - Envarsus 7mg daily, MMF 1g BID, pred taper  - Monitor daily tacro level.   - PPX: bactrim, valcyte, nystatin    3. Infection - Covid, and donor with latent TB  - On remdesivir for 5 days    - INH/B6 treatment  - per transplant ID    4. DVT ppx - Hep subq    If you have any questions, please feel free to contact me  Natalie Coley  Nephrology Fellow  You[employer]/Page [phone]  (After 5pm or on weekends please page the on-call fellow)
68 year old male with ESRD on HD since 06/2022 on M/W/F ( via left forearm AV fistula), DM2, neuropathy, scoliosis, MASH/MASLD who presents for planned directed living donor donation.     s/p living donor renal transplant 3/25/24.     #COVID19  Patient tested positive as outpatient for COVID19 on 3/23/24  No recent respiratory or viral symptoms over past few weeks  No new sick contacts  COVID19 PCR on 3/25 negative  Patient did have COVID19 in 1/2024 which I suspect is contributing to intermittent positive PCR's  --Stop Remdesivir  --Given unclear timing of illness (although likely 1/2024) can maintain COVID19 precautions for this admission (going from positive on 3/23)    #Donor Positive QuantiFeron Gold  Imaging in donor without evidence of active MTB  --At this point agree with continuing with Isoniazid 300 mg Q24H and Vitamin B6 for treatment of "latent disease." I will also coordinate with patient's hepatologist Dr Evangelista as patient has recent diagnosis of MAFLD/MASH which may necessitate more close monitoring of LFT's following eventual patient discharge from the hospital.     #Renal Transplant Recipient, Prophylactic Antibiotic (CMV -/-, EBV +/+)  --CMV -/-: Can utilize Acyclovir for viral prophylaxis   --Continue Bactrim SS PO Q24H for PCP PPx    I will continue to follow. Please feel free to contact me with any further questions.    Ran Wells M.D.  [facility] Division of Infectious Disease  8AM-5PM Monday - Friday: Available on [employer] Teams  After Hours and Holidays (or if no response on Microsoft Teams): Please contact the Infectious Diseases Office at [phone]    The above assessment and plan were discussed with Olivia Transplant PA

## 2024-03-26 NOTE — PHYSICAL THERAPY INITIAL EVALUATION ADULT - PERTINENT HX OF CURRENT PROBLEM, REHAB EVAL
68 y.o. M PMH of obesity , proteinuria related to obesity, kidney failure diagnosed in 1981, DM2 since 1992, ESRD on HD since 06/2022 on M/W/F ( via left forearm AV fistula ), neuropathy, scoliosis, KRYSTIAN prior to weight loss from  lab band insertion in 2010(currently in deflated position since foot surgery in 2019) & Patient had CT in 11/22 with ascites and pericardial effusion &  s/p pericardiocentesis, follow up  Labs from 7/2023 showed elevated AST/ALT on GI follow up. Cause of Kidney Failure: Obesity. Now s/p R LDRT on 3/25/24.

## 2024-03-26 NOTE — PROGRESS NOTE ADULT - SUBJECTIVE AND OBJECTIVE BOX
Transplant Surgery - Multidisciplinary Rounds  --------------------------------------------------------------  LDRT     Date: 03/25/2024        POD#1    Present: Present: Patient seen and examined with multidisciplinary team including transplant surgeon Dr. Borrego & fellow Dr. Roth, transplant nephrologist Dr. Santos & fellow Dr. Coley, pharmacist Thad Muñoz, RANDY Laureano, and unit RN Osiris during AM rounds. Disciplines not in attendance will be notified of the plan.     Interval Events:  - s/p LDRT  - Renal Doppler w/ patent vessels    Immunosuppression  Induction: Simulect  Maintenance immunosuppression: Envarsus by level, MMF 1 g BID, steroid taper, POD#4 Simulect  Ongoing monitoring for signs of rejection.    Potential discharge date: TBD    MEDICATIONS  (STANDING):  albuterol    90 MICROgram(s) HFA Inhaler 2 Puff(s) Inhalation every 6 hours  chlorhexidine 2% Cloths 1 Application(s) Topical daily  dextrose 5%. 1000 milliLiter(s) (50 mL/Hr) IV Continuous <Continuous>  dextrose 5%. 1000 milliLiter(s) (100 mL/Hr) IV Continuous <Continuous>  dextrose 50% Injectable 25 Gram(s) IV Push once  dextrose 50% Injectable 12.5 Gram(s) IV Push once  dextrose 50% Injectable 25 Gram(s) IV Push once  famotidine    Tablet 20 milliGRAM(s) Oral daily  glucagon  Injectable 1 milliGRAM(s) IntraMuscular once  influenza  Vaccine (HIGH DOSE) 0.7 milliLiter(s) IntraMuscular once  insulin lispro (ADMELOG) corrective regimen sliding scale   SubCutaneous three times a day before meals  insulin lispro (ADMELOG) corrective regimen sliding scale   SubCutaneous at bedtime  isoniazid 300 milliGRAM(s) Oral daily  levothyroxine 125 MICROGram(s) Oral daily  methylPREDNISolone sodium succinate Injectable   IV Push   methylPREDNISolone sodium succinate Injectable 125 milliGRAM(s) IV Push two times a day  multiple electrolytes Injection Type 1 1000 milliLiter(s) (70 mL/Hr) IV Continuous <Continuous>  mycophenolate mofetil 1 Gram(s) Oral every 12 hours  nystatin    Suspension 106232 Unit(s) Swish and Swallow four times a day  polyethylene glycol 3350 17 Gram(s) Oral daily  pyridoxine 50 milliGRAM(s) Oral daily  remdesivir  IVPB   IV Intermittent   remdesivir  IVPB 100 milliGRAM(s) IV Intermittent every 24 hours  senna 2 Tablet(s) Oral at bedtime  tacrolimus ER Tablet (ENVARSUS XR) 7 milliGRAM(s) Oral <User Schedule>  trimethoprim   80 mG/sulfamethoxazole 400 mG 1 Tablet(s) Oral daily  valGANciclovir 450 milliGRAM(s) Oral daily    MEDICATIONS  (PRN):  acetaminophen     Tablet .. 650 milliGRAM(s) Oral every 6 hours PRN Mild Pain (1 - 3)  dextrose Oral Gel 15 Gram(s) Oral once PRN Blood Glucose LESS THAN 70 milliGRAM(s)/deciliter  ondansetron Injectable 4 milliGRAM(s) IV Push every 6 hours PRN Nausea and/or Vomiting  traMADol 25 milliGRAM(s) Oral every 6 hours PRN Moderate Pain (4 - 6)  traMADol 50 milliGRAM(s) Oral every 8 hours PRN Severe Pain (7 - 10)      PAST MEDICAL & SURGICAL HISTORY:  Hard of hearing - bilateral hearing Aids  Type 2 diabetes mellitus  HTN (hypertension)  Hypothyroidism  Osteomyelitis  ESRD on dialysis  S/P partial thyroidectomy 1995  benign  H/O laparoscopic adjustable gastric banding 2010  S/P cataract extraction 2008 and 2011   Bilateral  History of foreign body aspiration - 7/2018  from Left Ear  History of amputation of toe    Vital Signs Last 24 Hrs  T(C): 36.7 (26 Mar 2024 07:00), Max: 36.7 (26 Mar 2024 03:00)  T(F): 98 (26 Mar 2024 07:00), Max: 98 (26 Mar 2024 03:00)  HR: 78 (26 Mar 2024 07:00) (69 - 116)  BP: 109/67 (26 Mar 2024 07:00) (102/57 - 142/68)  BP(mean): 83 (25 Mar 2024 22:30) (78 - 98)  RR: 18 (26 Mar 2024 07:00) (16 - 20)  SpO2: 100% (26 Mar 2024 07:00) (94% - 100%)    Parameters below as of 26 Mar 2024 07:00  Patient On (Oxygen Delivery Method): room air    I&O's Summary    25 Mar 2024 07:01  -  26 Mar 2024 07:00  --------------------------------------------------------  IN: 5405 mL / OUT: 5425 mL / NET: -20 mL    26 Mar 2024 07:01  -  26 Mar 2024 09:34  --------------------------------------------------------  IN: 410 mL / OUT: 200 mL / NET: 210 mL               7.8    9.61  )-----------( 138      ( 26 Mar 2024 09:00 )             23.6    137  |  95<L>  |  31<H>  ----------------------------<  145<H>  4.5   |  23  |  3.82<H>    Ca    9.3      26 Mar 2024 09:00  Phos  4.5  Mg     2.9  TPro  7.0  /  Alb  3.8  /  TBili  0.4  /  DBili  x   /  AST  31  /  ALT  23  /  AlkPhos  91    Review of systems  Gen: No weight changes, fatigue, fevers/chills, weakness  Skin: No rashes  Head/Eyes/Ears/Mouth: No headache; Normal hearing; Normal vision w/o blurriness; No sinus pain/discomfort, sore throat  Respiratory: No dyspnea, cough, wheezing, hemoptysis  CV: No chest pain, PND, orthopnea  GI: Mild abdominal pain at surgical incision site; denies diarrhea, constipation, nausea, vomiting, melena, hematochezia  : No increased frequency, dysuria, hematuria, nocturia  MSK: No joint pain/swelling; no back pain; no edema  Neuro: No dizziness/lightheadedness, weakness, seizures, numbness, tingling  Heme: No easy bruising or bleeding  Endo: No heat/cold intolerance  Psych: No significant nervousness, anxiety, stress, depression  All other systems were reviewed and are negative, except as noted.    PHYSICAL EXAM:  Constitutional: Well developed / well nourished  Eyes: Anicteric, PERRLA  ENMT: nc/at  Neck: central line *****************  Respiratory: CTA B/L  Cardiovascular: RRR  Gastrointestinal: Soft, non distended, mild tenderness at the incision site; incision c/d/i; MARKY .....   Genitourinary: Urinary catheter in place*****Voiding spontaneously  Extremities: SCD's in place and working bilaterally, AVF.....  Vascular: Palpable dp pulses bilaterally  Neurological: A&O x3  Skin: no rashes, ulcerations or lesions;  Musculoskeletal: Moving all extremities  Psychiatric: Responsive     Transplant Surgery - Multidisciplinary Rounds  --------------------------------------------------------------  LDRT     Date: 03/25/2024        POD#1    Present: Present: Patient seen and examined with multidisciplinary team including transplant surgeon Dr. Borrego & fellow Dr. Roth, transplant nephrologist Dr. Santos & fellow Dr. Coley, pharmacist Thad Muñoz, RANDY Laureano, and unit RN Osiris during AM rounds. Disciplines not in attendance will be notified of the plan.     Interval Events:  - s/p LDRT  - Renal Doppler w/ patent vessels    Immunosuppression  Induction: Simulect  Maintenance immunosuppression: Envarsus by level, MMF 1 g BID, steroid taper, POD#4 Simulect  Ongoing monitoring for signs of rejection.    Potential discharge date: TBD    MEDICATIONS  (STANDING):  albuterol    90 MICROgram(s) HFA Inhaler 2 Puff(s) Inhalation every 6 hours  chlorhexidine 2% Cloths 1 Application(s) Topical daily  dextrose 5%. 1000 milliLiter(s) (50 mL/Hr) IV Continuous <Continuous>  dextrose 5%. 1000 milliLiter(s) (100 mL/Hr) IV Continuous <Continuous>  dextrose 50% Injectable 25 Gram(s) IV Push once  dextrose 50% Injectable 12.5 Gram(s) IV Push once  dextrose 50% Injectable 25 Gram(s) IV Push once  famotidine    Tablet 20 milliGRAM(s) Oral daily  glucagon  Injectable 1 milliGRAM(s) IntraMuscular once  influenza  Vaccine (HIGH DOSE) 0.7 milliLiter(s) IntraMuscular once  insulin lispro (ADMELOG) corrective regimen sliding scale   SubCutaneous three times a day before meals  insulin lispro (ADMELOG) corrective regimen sliding scale   SubCutaneous at bedtime  isoniazid 300 milliGRAM(s) Oral daily  levothyroxine 125 MICROGram(s) Oral daily  methylPREDNISolone sodium succinate Injectable   IV Push   methylPREDNISolone sodium succinate Injectable 125 milliGRAM(s) IV Push two times a day  multiple electrolytes Injection Type 1 1000 milliLiter(s) (70 mL/Hr) IV Continuous <Continuous>  mycophenolate mofetil 1 Gram(s) Oral every 12 hours  nystatin    Suspension 490873 Unit(s) Swish and Swallow four times a day  polyethylene glycol 3350 17 Gram(s) Oral daily  pyridoxine 50 milliGRAM(s) Oral daily  remdesivir  IVPB   IV Intermittent   remdesivir  IVPB 100 milliGRAM(s) IV Intermittent every 24 hours  senna 2 Tablet(s) Oral at bedtime  tacrolimus ER Tablet (ENVARSUS XR) 7 milliGRAM(s) Oral <User Schedule>  trimethoprim   80 mG/sulfamethoxazole 400 mG 1 Tablet(s) Oral daily  valGANciclovir 450 milliGRAM(s) Oral daily    MEDICATIONS  (PRN):  acetaminophen     Tablet .. 650 milliGRAM(s) Oral every 6 hours PRN Mild Pain (1 - 3)  dextrose Oral Gel 15 Gram(s) Oral once PRN Blood Glucose LESS THAN 70 milliGRAM(s)/deciliter  ondansetron Injectable 4 milliGRAM(s) IV Push every 6 hours PRN Nausea and/or Vomiting  traMADol 25 milliGRAM(s) Oral every 6 hours PRN Moderate Pain (4 - 6)  traMADol 50 milliGRAM(s) Oral every 8 hours PRN Severe Pain (7 - 10)    PAST MEDICAL & SURGICAL HISTORY:  Hard of hearing - bilateral hearing Aids  Type 2 diabetes mellitus  HTN (hypertension)  Hypothyroidism  Osteomyelitis  ESRD on dialysis  S/P partial thyroidectomy 1995  benign  H/O laparoscopic adjustable gastric banding 2010  S/P cataract extraction 2008 and 2011   Bilateral  History of foreign body aspiration - 7/2018  from Left Ear  History of amputation of toe    Vital Signs Last 24 Hrs  T(C): 36.7 (26 Mar 2024 07:00), Max: 36.7 (26 Mar 2024 03:00)  T(F): 98 (26 Mar 2024 07:00), Max: 98 (26 Mar 2024 03:00)  HR: 78 (26 Mar 2024 07:00) (69 - 116)  BP: 109/67 (26 Mar 2024 07:00) (102/57 - 142/68)  BP(mean): 83 (25 Mar 2024 22:30) (78 - 98)  RR: 18 (26 Mar 2024 07:00) (16 - 20)  SpO2: 100% (26 Mar 2024 07:00) (94% - 100%)    Parameters below as of 26 Mar 2024 07:00  Patient On (Oxygen Delivery Method): room air    I&O's Summary    25 Mar 2024 07:01  -  26 Mar 2024 07:00  --------------------------------------------------------  IN: 5405 mL / OUT: 5425 mL / NET: -20 mL    26 Mar 2024 07:01  -  26 Mar 2024 09:34  --------------------------------------------------------  IN: 410 mL / OUT: 200 mL / NET: 210 mL               7.8    9.61  )-----------( 138      ( 26 Mar 2024 09:00 )             23.6    137  |  95<L>  |  31<H>  ----------------------------<  145<H>  4.5   |  23  |  3.82<H>    Ca    9.3      26 Mar 2024 09:00  Phos  4.5  Mg     2.9  TPro  7.0  /  Alb  3.8  /  TBili  0.4  /  DBili  x   /  AST  31  /  ALT  23  /  AlkPhos  91    Review of systems  Gen: No weight changes, fatigue, fevers/chills, weakness  Skin: No rashes  Head/Eyes/Ears/Mouth: No headache; Normal hearing; Normal vision w/o blurriness; No sinus pain/discomfort, sore throat  Respiratory: No dyspnea, cough, wheezing, hemoptysis  CV: No chest pain, PND, orthopnea  GI: Mild abdominal pain at surgical incision site; denies diarrhea, constipation, nausea, vomiting, melena, hematochezia  : No increased frequency, dysuria, hematuria, nocturia  MSK: No joint pain/swelling; no back pain; no edema  Neuro: No dizziness/lightheadedness, weakness, seizures, numbness, tingling  Heme: No easy bruising or bleeding  Endo: No heat/cold intolerance  Psych: No significant nervousness, anxiety, stress, depression  All other systems were reviewed and are negative, except as noted.    PHYSICAL EXAM:  Constitutional: Well developed / well nourished  Eyes: Anicteric, PERRLA  ENMT: nc/at  Neck: supple, trachea midline  Respiratory: CTA B/L  Cardiovascular: RRR  Gastrointestinal: Soft, non distended, mild tenderness at the incision site; incision c/d/i  Genitourinary: Urinary catheter in place draining yellow urine  Extremities: SCD's in place and working bilaterally  Vascular: Palpable dp pulses bilaterally  Neurological: A&O x3  Skin: no rashes, ulcerations or lesions;  Musculoskeletal: Moving all extremities  Psychiatric: Responsive

## 2024-03-26 NOTE — PHYSICAL THERAPY INITIAL EVALUATION ADULT - ADDITIONAL COMMENTS
Pt resides in a pvt home w/ spouse, 4 steps to enter (+HR), first floor set up inside. PTA pt was independent w/ all functional mobility & ADL's. Did not use an AD for ambulation. Owns cane & shower chair.

## 2024-03-26 NOTE — PROVIDER CONTACT NOTE (CRITICAL VALUE NOTIFICATION) - BACKGROUND
patient s/p LDRT POD 1 w/ Simulect, Covid positive. PMH of obesity, proteinuria related to obesity, kidney failure diagnosed in 1981, DM2 since 1992, neuropathy, scoliosis, KRYSTIAN
patient s/p LDRT POD 1 w/ Simulect, Covid positive. PMH of obesity, proteinuria related to obesity, kidney failure diagnosed in 1981, DM2 since 1992, neuropathy, scoliosis, KRYSTIAN

## 2024-03-27 LAB
ALBUMIN SERPL ELPH-MCNC: 3.2 G/DL — LOW (ref 3.3–5)
ALP SERPL-CCNC: 80 U/L — SIGNIFICANT CHANGE UP (ref 40–120)
ALT FLD-CCNC: 35 U/L — SIGNIFICANT CHANGE UP (ref 10–45)
ANION GAP SERPL CALC-SCNC: 11 MMOL/L — SIGNIFICANT CHANGE UP (ref 5–17)
AST SERPL-CCNC: 36 U/L — SIGNIFICANT CHANGE UP (ref 10–40)
BASOPHILS # BLD AUTO: 0.01 K/UL — SIGNIFICANT CHANGE UP (ref 0–0.2)
BASOPHILS NFR BLD AUTO: 0.1 % — SIGNIFICANT CHANGE UP (ref 0–2)
BILIRUB SERPL-MCNC: 0.4 MG/DL — SIGNIFICANT CHANGE UP (ref 0.2–1.2)
BUN SERPL-MCNC: 29 MG/DL — HIGH (ref 7–23)
CALCIUM SERPL-MCNC: 8.6 MG/DL — SIGNIFICANT CHANGE UP (ref 8.4–10.5)
CHLORIDE SERPL-SCNC: 99 MMOL/L — SIGNIFICANT CHANGE UP (ref 96–108)
CO2 SERPL-SCNC: 24 MMOL/L — SIGNIFICANT CHANGE UP (ref 22–31)
CREAT SERPL-MCNC: 2.09 MG/DL — HIGH (ref 0.5–1.3)
EGFR: 34 ML/MIN/1.73M2 — LOW
EOSINOPHIL # BLD AUTO: 0.02 K/UL — SIGNIFICANT CHANGE UP (ref 0–0.5)
EOSINOPHIL NFR BLD AUTO: 0.2 % — SIGNIFICANT CHANGE UP (ref 0–6)
GLUCOSE BLDC GLUCOMTR-MCNC: 157 MG/DL — HIGH (ref 70–99)
GLUCOSE BLDC GLUCOMTR-MCNC: 223 MG/DL — HIGH (ref 70–99)
GLUCOSE BLDC GLUCOMTR-MCNC: 283 MG/DL — HIGH (ref 70–99)
GLUCOSE BLDC GLUCOMTR-MCNC: 322 MG/DL — HIGH (ref 70–99)
GLUCOSE SERPL-MCNC: 146 MG/DL — HIGH (ref 70–99)
HCT VFR BLD CALC: 25.9 % — LOW (ref 39–50)
HGB BLD-MCNC: 8.1 G/DL — LOW (ref 13–17)
IMM GRANULOCYTES NFR BLD AUTO: 0.6 % — SIGNIFICANT CHANGE UP (ref 0–0.9)
LYMPHOCYTES # BLD AUTO: 0.5 K/UL — LOW (ref 1–3.3)
LYMPHOCYTES # BLD AUTO: 5.8 % — LOW (ref 13–44)
MAGNESIUM SERPL-MCNC: 2.9 MG/DL — HIGH (ref 1.6–2.6)
MCHC RBC-ENTMCNC: 31.3 GM/DL — LOW (ref 32–36)
MCHC RBC-ENTMCNC: 33.8 PG — SIGNIFICANT CHANGE UP (ref 27–34)
MCV RBC AUTO: 107.9 FL — HIGH (ref 80–100)
MONOCYTES # BLD AUTO: 0.43 K/UL — SIGNIFICANT CHANGE UP (ref 0–0.9)
MONOCYTES NFR BLD AUTO: 5 % — SIGNIFICANT CHANGE UP (ref 2–14)
NEUTROPHILS # BLD AUTO: 7.54 K/UL — HIGH (ref 1.8–7.4)
NEUTROPHILS NFR BLD AUTO: 88.3 % — HIGH (ref 43–77)
NRBC # BLD: 0 /100 WBCS — SIGNIFICANT CHANGE UP (ref 0–0)
PHOSPHATE SERPL-MCNC: 3.3 MG/DL — SIGNIFICANT CHANGE UP (ref 2.5–4.5)
PLATELET # BLD AUTO: 105 K/UL — LOW (ref 150–400)
POTASSIUM SERPL-MCNC: 4.2 MMOL/L — SIGNIFICANT CHANGE UP (ref 3.5–5.3)
POTASSIUM SERPL-SCNC: 4.2 MMOL/L — SIGNIFICANT CHANGE UP (ref 3.5–5.3)
PROT SERPL-MCNC: 5.6 G/DL — LOW (ref 6–8.3)
RBC # BLD: 2.4 M/UL — LOW (ref 4.2–5.8)
RBC # FLD: 14.4 % — SIGNIFICANT CHANGE UP (ref 10.3–14.5)
SODIUM SERPL-SCNC: 134 MMOL/L — LOW (ref 135–145)
TACROLIMUS SERPL-MCNC: 11.7 NG/ML — SIGNIFICANT CHANGE UP
WBC # BLD: 8.55 K/UL — SIGNIFICANT CHANGE UP (ref 3.8–10.5)
WBC # FLD AUTO: 8.55 K/UL — SIGNIFICANT CHANGE UP (ref 3.8–10.5)

## 2024-03-27 PROCEDURE — 99233 SBSQ HOSP IP/OBS HIGH 50: CPT | Mod: GC

## 2024-03-27 PROCEDURE — 99232 SBSQ HOSP IP/OBS MODERATE 35: CPT

## 2024-03-27 RX ORDER — FAMOTIDINE 20 MG/1
20 TABLET, FILM COATED ORAL
Qty: 30 | Refills: 3 | Status: ACTIVE | COMMUNITY
Start: 2024-03-27 | End: 1900-01-01

## 2024-03-27 RX ORDER — METOPROLOL TARTRATE 50 MG
12.5 TABLET ORAL
Refills: 0 | Status: DISCONTINUED | OUTPATIENT
Start: 2024-03-27 | End: 2024-03-29

## 2024-03-27 RX ORDER — SENNOSIDES 8.6 MG/1
8.6 CAPSULE, GELATIN COATED ORAL
Qty: 30 | Refills: 1 | Status: ACTIVE | COMMUNITY
Start: 2024-03-27 | End: 1900-01-01

## 2024-03-27 RX ORDER — REPAGLINIDE 1 MG/1
1 TABLET ORAL 3 TIMES DAILY
Qty: 90 | Refills: 3 | Status: ACTIVE | COMMUNITY
Start: 2024-03-27 | End: 1900-01-01

## 2024-03-27 RX ORDER — REPAGLINIDE 0.5 MG/1
0.5 TABLET ORAL
Refills: 0 | Status: DISCONTINUED | COMMUNITY
End: 2024-03-27

## 2024-03-27 RX ORDER — ACYCLOVIR SODIUM 500 MG
400 VIAL (EA) INTRAVENOUS
Refills: 0 | Status: DISCONTINUED | OUTPATIENT
Start: 2024-03-27 | End: 2024-03-29

## 2024-03-27 RX ORDER — SULFAMETHOXAZOLE AND TRIMETHOPRIM 400; 80 MG/1; MG/1
400-80 TABLET ORAL
Qty: 30 | Refills: 11 | Status: ACTIVE | COMMUNITY
Start: 2024-03-27 | End: 1900-01-01

## 2024-03-27 RX ORDER — REPAGLINIDE 1 MG/1
1 TABLET ORAL
Refills: 0 | Status: DISCONTINUED | OUTPATIENT
Start: 2024-03-27 | End: 2024-03-29

## 2024-03-27 RX ORDER — MYCOPHENOLATE MOFETIL 500 MG/1
500 TABLET ORAL TWICE DAILY
Qty: 120 | Refills: 11 | Status: ACTIVE | COMMUNITY
Start: 2024-03-27 | End: 1900-01-01

## 2024-03-27 RX ADMIN — Medication 500000 UNIT(S): at 17:02

## 2024-03-27 RX ADMIN — TRAMADOL HYDROCHLORIDE 50 MILLIGRAM(S): 50 TABLET ORAL at 13:26

## 2024-03-27 RX ADMIN — Medication 500000 UNIT(S): at 12:04

## 2024-03-27 RX ADMIN — Medication 500000 UNIT(S): at 05:31

## 2024-03-27 RX ADMIN — TRAMADOL HYDROCHLORIDE 50 MILLIGRAM(S): 50 TABLET ORAL at 21:28

## 2024-03-27 RX ADMIN — Medication 125 MICROGRAM(S): at 20:32

## 2024-03-27 RX ADMIN — Medication 1 TABLET(S): at 12:04

## 2024-03-27 RX ADMIN — REPAGLINIDE 1 MILLIGRAM(S): 1 TABLET ORAL at 17:03

## 2024-03-27 RX ADMIN — Medication 300 MILLIGRAM(S): at 12:06

## 2024-03-27 RX ADMIN — CHLORHEXIDINE GLUCONATE 1 APPLICATION(S): 213 SOLUTION TOPICAL at 12:13

## 2024-03-27 RX ADMIN — TRAMADOL HYDROCHLORIDE 50 MILLIGRAM(S): 50 TABLET ORAL at 20:37

## 2024-03-27 RX ADMIN — Medication 60 MILLIGRAM(S): at 05:31

## 2024-03-27 RX ADMIN — Medication 50 MILLIGRAM(S): at 12:05

## 2024-03-27 RX ADMIN — Medication 500000 UNIT(S): at 05:32

## 2024-03-27 RX ADMIN — FAMOTIDINE 20 MILLIGRAM(S): 10 INJECTION INTRAVENOUS at 12:05

## 2024-03-27 RX ADMIN — MYCOPHENOLATE MOFETIL 1 GRAM(S): 250 CAPSULE ORAL at 07:21

## 2024-03-27 RX ADMIN — Medication 60 MILLIGRAM(S): at 17:02

## 2024-03-27 RX ADMIN — MYCOPHENOLATE MOFETIL 1 GRAM(S): 250 CAPSULE ORAL at 20:32

## 2024-03-27 RX ADMIN — TACROLIMUS 7 MILLIGRAM(S): 5 CAPSULE ORAL at 07:20

## 2024-03-27 RX ADMIN — Medication 2: at 08:53

## 2024-03-27 RX ADMIN — REPAGLINIDE 1 MILLIGRAM(S): 1 TABLET ORAL at 12:06

## 2024-03-27 RX ADMIN — TRAMADOL HYDROCHLORIDE 50 MILLIGRAM(S): 50 TABLET ORAL at 14:25

## 2024-03-27 RX ADMIN — POLYETHYLENE GLYCOL 3350 17 GRAM(S): 17 POWDER, FOR SOLUTION ORAL at 12:05

## 2024-03-27 RX ADMIN — Medication 12.5 MILLIGRAM(S): at 17:02

## 2024-03-27 RX ADMIN — Medication 8: at 17:31

## 2024-03-27 RX ADMIN — VALGANCICLOVIR 450 MILLIGRAM(S): 450 TABLET, FILM COATED ORAL at 12:05

## 2024-03-27 RX ADMIN — Medication 6: at 13:26

## 2024-03-27 NOTE — PROGRESS NOTE ADULT - SUBJECTIVE AND OBJECTIVE BOX
Follow Up:      Interval History:    REVIEW OF SYSTEMS  [  ] ROS unobtainable because:    [  ] All other systems negative except as noted below    Constitutional:  [ ] fever [ ] chills  [ ] weight loss  [ ] weakness  Skin:  [ ] rash [ ] phlebitis	  Eyes: [ ] icterus [ ] pain  [ ] discharge	  ENMT: [ ] sore throat  [ ] thrush [ ] ulcers [ ] exudates  Respiratory: [ ] dyspnea [ ] hemoptysis [ ] cough [ ] sputum	  Cardiovascular:  [ ] chest pain [ ] palpitations [ ] edema	  Gastrointestinal:  [ ] nausea [ ] vomiting [ ] diarrhea [ ] constipation [ ] pain	  Genitourinary:  [ ] dysuria [ ] frequency [ ] hematuria [ ] discharge [ ] flank pain  [ ] incontinence  Musculoskeletal:  [ ] myalgias [ ] arthralgias [ ] arthritis  [ ] back pain  Neurological:  [ ] headache [ ] seizures  [ ] confusion/altered mental status    Allergies  capsicum (Unknown)  Novocain (Other)  Vasotec (Unknown)  MetFORMIN Hydrochloride (Unknown)  aspirin (Unknown)  Enalapril Maleate (Unknown)  Enalaprilat (Unknown)        ANTIMICROBIALS:  acyclovir   Oral Tab/Cap 400 two times a day  isoniazid 300 daily  nystatin    Suspension 833063 four times a day  trimethoprim   80 mG/sulfamethoxazole 400 mG 1 daily      OTHER MEDS:  MEDICATIONS  (STANDING):  acetaminophen     Tablet .. 650 every 6 hours PRN  albuterol    90 MICROgram(s) HFA Inhaler 2 every 6 hours  dextrose 50% Injectable 12.5 once  dextrose 50% Injectable 25 once  dextrose 50% Injectable 25 once  dextrose Oral Gel 15 once PRN  famotidine    Tablet 20 daily  glucagon  Injectable 1 once  influenza  Vaccine (HIGH DOSE) 0.7 once  insulin lispro (ADMELOG) corrective regimen sliding scale  three times a day before meals  insulin lispro (ADMELOG) corrective regimen sliding scale  at bedtime  levothyroxine 125 daily  methylPREDNISolone sodium succinate Injectable    methylPREDNISolone sodium succinate Injectable 60 two times a day  mycophenolate mofetil 1 every 12 hours  ondansetron Injectable 4 every 6 hours PRN  polyethylene glycol 3350 17 daily  repaglinide 1 three times a day before meals  senna 2 at bedtime  traMADol 25 every 6 hours PRN  traMADol 50 every 8 hours PRN      Vital Signs Last 24 Hrs  T(C): 37.6 (27 Mar 2024 13:13), Max: 37.6 (27 Mar 2024 13:13)  T(F): 99.7 (27 Mar 2024 13:13), Max: 99.7 (27 Mar 2024 13:13)  HR: 83 (27 Mar 2024 13:13) (68 - 83)  BP: 169/96 (27 Mar 2024 13:13) (128/76 - 169/96)  BP(mean): --  RR: 20 (27 Mar 2024 13:13) (18 - 20)  SpO2: 100% (27 Mar 2024 13:13) (98% - 100%)    Parameters below as of 27 Mar 2024 13:13  Patient On (Oxygen Delivery Method): room air        PHYSICAL EXAMINATION:  General: Alert and Awake, NAD  HEENT: PERRL, EOMI  Neck: Supple  Cardiac: RRR, No M/R/G  Resp: CTAB, No Wh/Rh/Ra  Abdomen: NBS, NT/ND, No HSM, No rigidity or guarding  MSK: No LE edema. No Calf tenderness  : No luna  Skin: No rashes or lesions. Skin is warm and dry to the touch.   Neuro: Alert and Awake. CN 2-12 Grossly intact. Moves all four extremities spontaneously.  Psych: Calm, Pleasant, Cooperative                          8.1    8.55  )-----------( 105      ( 27 Mar 2024 06:53 )             25.9       03-27    134<L>  |  99  |  29<H>  ----------------------------<  146<H>  4.2   |  24  |  2.09<H>    Ca    8.6      27 Mar 2024 06:53  Phos  3.3     03-27  Mg     2.9     03-27    TPro  5.6<L>  /  Alb  3.2<L>  /  TBili  0.4  /  DBili  x   /  AST  36  /  ALT  35  /  AlkPhos  80  03-27      Urinalysis Basic - ( 27 Mar 2024 06:53 )    Color: x / Appearance: x / SG: x / pH: x  Gluc: 146 mg/dL / Ketone: x  / Bili: x / Urobili: x   Blood: x / Protein: x / Nitrite: x   Leuk Esterase: x / RBC: x / WBC x   Sq Epi: x / Non Sq Epi: x / Bacteria: x        MICROBIOLOGY:  v            RADIOLOGY:    <The imaging below has been reviewed and visualized by me independently. Findings as detailed in report below> Follow Up:  Donor Positive QuantiFeron Gold, COVID19    Interval History: afebrile. no acute events.     REVIEW OF SYSTEMS  [  ] ROS unobtainable because:    [ x ] All other systems negative except as noted below    Constitutional:  [ ] fever [ ] chills  [ ] weight loss  [ ] weakness  Skin:  [ ] rash [ ] phlebitis	  Eyes: [ ] icterus [ ] pain  [ ] discharge	  ENMT: [ ] sore throat  [ ] thrush [ ] ulcers [ ] exudates  Respiratory: [ ] dyspnea [ ] hemoptysis [ ] cough [ ] sputum	  Cardiovascular:  [ ] chest pain [ ] palpitations [ ] edema	  Gastrointestinal:  [ ] nausea [ ] vomiting [ ] diarrhea [ ] constipation [ ] pain	  Genitourinary:  [ ] dysuria [ ] frequency [ ] hematuria [ ] discharge [ ] flank pain  [ ] incontinence  Musculoskeletal:  [ ] myalgias [ ] arthralgias [ ] arthritis  [ ] back pain  Neurological:  [ ] headache [ ] seizures  [ ] confusion/altered mental status    Allergies  capsicum (Unknown)  Novocain (Other)  Vasotec (Unknown)  MetFORMIN Hydrochloride (Unknown)  aspirin (Unknown)  Enalapril Maleate (Unknown)  Enalaprilat (Unknown)        ANTIMICROBIALS:  acyclovir   Oral Tab/Cap 400 two times a day  isoniazid 300 daily  nystatin    Suspension 342367 four times a day  trimethoprim   80 mG/sulfamethoxazole 400 mG 1 daily      OTHER MEDS:  MEDICATIONS  (STANDING):  acetaminophen     Tablet .. 650 every 6 hours PRN  albuterol    90 MICROgram(s) HFA Inhaler 2 every 6 hours  dextrose 50% Injectable 12.5 once  dextrose 50% Injectable 25 once  dextrose 50% Injectable 25 once  dextrose Oral Gel 15 once PRN  famotidine    Tablet 20 daily  glucagon  Injectable 1 once  influenza  Vaccine (HIGH DOSE) 0.7 once  insulin lispro (ADMELOG) corrective regimen sliding scale  three times a day before meals  insulin lispro (ADMELOG) corrective regimen sliding scale  at bedtime  levothyroxine 125 daily  methylPREDNISolone sodium succinate Injectable    methylPREDNISolone sodium succinate Injectable 60 two times a day  mycophenolate mofetil 1 every 12 hours  ondansetron Injectable 4 every 6 hours PRN  polyethylene glycol 3350 17 daily  repaglinide 1 three times a day before meals  senna 2 at bedtime  traMADol 25 every 6 hours PRN  traMADol 50 every 8 hours PRN      Vital Signs Last 24 Hrs  T(C): 37.6 (27 Mar 2024 13:13), Max: 37.6 (27 Mar 2024 13:13)  T(F): 99.7 (27 Mar 2024 13:13), Max: 99.7 (27 Mar 2024 13:13)  HR: 83 (27 Mar 2024 13:13) (68 - 83)  BP: 169/96 (27 Mar 2024 13:13) (128/76 - 169/96)  BP(mean): --  RR: 20 (27 Mar 2024 13:13) (18 - 20)  SpO2: 100% (27 Mar 2024 13:13) (98% - 100%)    Parameters below as of 27 Mar 2024 13:13  Patient On (Oxygen Delivery Method): room air      PHYSICAL EXAMINATION:  General: Alert and Awake, NAD  HEENT: Normocephalic / Atraumatic  Resp: No accessory muscles of respiration utilized  Abdomen: Not distended.  MSK: No LE edema.   Skin: No rashes or lesions.    Neuro: Alert and Awake. CN 2-12 Grossly intact. Moves all four extremities spontaneously.  Psych: Calm, Pleasant, Cooperative                          8.1    8.55  )-----------( 105      ( 27 Mar 2024 06:53 )             25.9       03-27    134<L>  |  99  |  29<H>  ----------------------------<  146<H>  4.2   |  24  |  2.09<H>    Ca    8.6      27 Mar 2024 06:53  Phos  3.3     03-27  Mg     2.9     03-27    TPro  5.6<L>  /  Alb  3.2<L>  /  TBili  0.4  /  DBili  x   /  AST  36  /  ALT  35  /  AlkPhos  80  03-27      Urinalysis Basic - ( 27 Mar 2024 06:53 )    Color: x / Appearance: x / SG: x / pH: x  Gluc: 146 mg/dL / Ketone: x  / Bili: x / Urobili: x   Blood: x / Protein: x / Nitrite: x   Leuk Esterase: x / RBC: x / WBC x   Sq Epi: x / Non Sq Epi: x / Bacteria: x      RADIOLOGY:    <The imaging below has been reviewed and visualized by me independently. Findings as detailed in report below>    < from: US Trans Kidney w/ Doppler, Right (03.25.24 @ 18:25) >  IMPRESSION:  Mildly elevated velocities at the transplant artery anastomosis;   continued follow-up is recommended.    Otherwise no evidence of a significant renal artery stenosis.    < end of copied text >

## 2024-03-27 NOTE — DIETITIAN INITIAL EVALUATION ADULT - REASON INDICATOR FOR ASSESSMENT
Pt seen for post kidney transplant recipient nutrition evaluation per department protocol.  Information obtained from: Review of pt's current medical record, interview with pt and his wife in pt's assigned room on 6MONTI

## 2024-03-27 NOTE — PROGRESS NOTE ADULT - ASSESSMENT
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 Now s/p LDRT with Simulect induction 3/25/2024    1. s/p LDRT with Simulect induction 3/25/2024  - Pt with improving renal function and 24hr UOP ~2.6L. No further indication for HD.   - Post op US w/ elevated velocities otherwise patent vessels  - Avoid hypotension, keep SYS BP >130  - Monito labs, UOP and VS    2. Immunosuppression:  - Simulect induction  - Envarsus 7mg daily, MMF 1g BID, pred taper  - Monitor daily tacro level. Pending AM lab.  - PPX: bactrim, valcyte, nystatin    3. Infection - Covid, and donor with latent TB  - Remdesivir discontinued    - INH/B6 treatment  - per transplant ID    4. DVT ppx - Hep subq    If you have any questions, please feel free to contact me  Natalie Coley  Nephrology Fellow  [employer]/Page [phone]  (After 5pm or on weekends please page the on-call fellow)

## 2024-03-27 NOTE — DIETITIAN INITIAL EVALUATION ADULT - REASON FOR ADMISSION
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

## 2024-03-27 NOTE — PROGRESS NOTE ADULT - ASSESSMENT
68 year old male with ESRD on HD since 06/2022 on M/W/F ( via left forearm AV fistula), DM2, neuropathy, scoliosis, MASH/MASLD who presents for planned directed living donor donation.     s/p living donor renal transplant 3/25/24.     #COVID19  Patient tested positive as outpatient for COVID19 on 3/23/24  No recent respiratory or viral symptoms over past few weeks  No new sick contacts  COVID19 PCR on 3/25 negative  Patient did have COVID19 in 1/2024 which I suspect is contributing to intermittent positive PCR's  --Monitor off of Remdesivir    #Donor Positive QuantiFeron Gold  Imaging in donor without evidence of active MTB  --At this point agree with continuing with Isoniazid 300 mg Q24H and Vitamin B6 for treatment of "latent disease." Discussed with Dr Evangelista (hepatology); will monitor LFT's every 2 weeks following discharge given recent diagnosis of MAFLD/MASH.    #Renal Transplant Recipient, Prophylactic Antibiotic (CMV -/-, EBV +/+)  --CMV -/-: Can utilize Acyclovir for viral prophylaxis   --Continue Bactrim SS PO Q24H for PCP PPx    Transplant ID will not follow patient regularly going forward. Please feel free to reach out with any further questions or concerns.    Ran Wells M.D.  [facility] Division of Infectious Disease  8AM-5PM Monday - Friday: Available on [employer] Teams  After Hours and Holidays (or if no response on [employer] Teams): Please contact the Infectious Diseases Office at [phone]

## 2024-03-27 NOTE — PROGRESS NOTE ADULT - SUBJECTIVE AND OBJECTIVE BOX
RICHARD WILSON is a 68y Male s/p LURT on 3/25/24. 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    S/p LDRT with 2a/1v/1u. Total ischemia time 2 hours. HLA 2/2/2 mismatch. No DSA. CDC CXM T and B cell negative.    Allergies  ·	NKDA    Serologies  ·	CMV D-/R-  ·	EBV D+/R+    Transplant Medications  Induction  - Basiliximab 20 mg POD 0 (given in OR) and POD 4  - Methyprednisolone taper (switch to PO prednisone on POD 4)            POD 0: 500 mg IV in OR            POD 1: 125 mg IV Q12H            POD 2: 60 mg IV Q12H            POD 3: 30 mg IV Q12H        Maintenance Immunosuppression  - Tacrolimus (Envarsus) 0.14 mg/kg daily (Adjust for goal trough: 8-10)  - Mycophenolate 1,000 mg PO Q12H  - Prednisone             POD 4: 20 mg PO Q12H            POD 5: 10 mg PO Q12H            POD 6: 5 mg PO Q12H            POD 7-: 5 mg daily     Anti-infection   - Bactrim SS tablet (frequency based on renal function)  - Valganciclovir (dose based on CMV serostatus and frequency based on renal function)  - Nystatin swish and swallow 5 mL four times daily    Surgical prophylaxis pre- and intra-operative dosing  - Cefazolin    Prophylaxis  - GI ppx: famotidine 20 mg daily  - Bowel ppx: senna  - DVT: sequential compression device  - Pain:            Mild: Acetaminophen 650 mg every 6 hours PRN           Moderate: Tramadol 25 mg every 4 hours PRN (adjust for renal function)           Severe: Tramadol 50 mg every 4 hours PRN (adjust for renal function)    Home Medications:  Albuterol (Eqv-Proventil HFA) 90 mcg/inh inhalation aerosol: 2 puff(s) inhaled every 6 hours, As Needed (25 Mar 2024 07:48)  biotin 2.5 mg oral tablet: 1 tab(s) orally once a day (25 Mar 2024 07:48)  folic acid 1 mg oral tablet: 1 tab(s) orally once a day (25 Mar 2024 07:48)  levothyroxine 125 mcg (0.125 mg) oral capsule: 1 cap(s) orally once a day (at bedtime) (25 Mar 2024 07:48)  metoprolol tartrate 50 mg oral tablet: 1 tab(s) orally 2 times a day (25 Mar 2024 07:48)  sevelamer carbonate 800 mg oral tablet: 1 tab(s) orally 2 times a day (25 Mar 2024 07:48)  sodium bicarbonate 10 grains pm:  (25 Mar 2024 07:48)  Trelegy Ellipta 200 mcg-62.5 mcg-25 mcg/inh inhalation powder: 1 puff(s) inhaled once a day (at bedtime) (25 Mar 2024 07:48)    Plan  Continue basiliximab induction, plan for final dose of 20 mg on 3/28/24  Recommend starting subq heaprin for DVT prophylaxis when cleared by surgery  Recommend changing valcyte prophylaxis therapy to acyclovir 400 mg twice daily due to CMV D-/R- (low-risk)  Monitor tacrolimus trough levels after initiation of isoniazid (weak CY inhibitor), recommend holding tomorrow AM dose and check level before redosing    Outpatient medication reconciliation reviewed and will be re-started appropriately.  Plan discussed with multidisciplinary team.    Jenni Muñoz, PharmD

## 2024-03-27 NOTE — DIETITIAN INITIAL EVALUATION ADULT - PHYSCIAL ASSESSMENT
Weights:  - Source: Patient   - UBW: 91.8 kg  - Reported weight changes: Pt reports no recent weight changes    Current Admission Weights:  - Dosing weight: 91.898 kg/202.6 pounds  (3/25/24)  - Daily weight: 95.2 kg/ 209.9 pounds (03-27) 94.1 kg/207.5 pounds  (03-26)    Weight History per [facility] HIE:  - 90.7 kg/ 200 pounds (1/5/23), 89.4 kg/ 197.1 pounds (7/25/23; 10/26/23)    Weight Change:  - Weight fluctuations in setting of fluid shifts (IVF, intraoperative fluids). Will continue to monitor weight trends as available/able.     IBW:163 pounds   %IBW: 129%

## 2024-03-27 NOTE — DIETITIAN INITIAL EVALUATION ADULT - ADD RECOMMEND
1) Continue Consistent Carbohydrate Renal diet  2) Encourage adequate PO intakes to promote surgical healing post-transplant.   3) Reinforce post-transplant nutrition therapy and food safety guidelines in-house and prior to discharge.   4) Discharge diet: Continue as above. Recommend follow up visit with Transplant MD and outpatient RD for dietary modifications as warranted.  5) Monitor PO intake,  Urine Output, GI tolerance, skin integrity,and labs. RD remains available if needed, pt is aware.

## 2024-03-27 NOTE — DIETITIAN INITIAL EVALUATION ADULT - ENERGY INTAKE
-- Pt reports he has a good appetite post-transplant; observed patient eating pastry in his room during RD visit

## 2024-03-27 NOTE — PROVIDER CONTACT NOTE (OTHER) - SITUATION
Notified by RN that patient states he and his wife had Covid in January.
Pt's pressure rising over the day, 169/96. Asymptomatic, other VS remain stable.

## 2024-03-27 NOTE — PROGRESS NOTE ADULT - SUBJECTIVE AND OBJECTIVE BOX
[facility] DIVISION OF KIDNEY DISEASES AND HYPERTENSION -- FOLLOW UP NOTE  --------------------------------------------------------------------------------  Chief Complaint: s/p renal transplant     24 hour events/subjective: Pt seen and examined this AM. Pt reports doing well, endorses fatigue mild abd pain at incision site. Otherwise no complaints. Denies any headaches, nausea, vomiting, fevers/chills, chest pain, SOB, and leg swelling.    PAST HISTORY  --------------------------------------------------------------------------------  No significant changes to PMH, PSH, FHx, SHx, unless otherwise noted    ALLERGIES & MEDICATIONS  --------------------------------------------------------------------------------  Allergies    capsicum (Unknown)  Novocain (Other)  Vasotec (Unknown)  MetFORMIN Hydrochloride (Unknown)  aspirin (Unknown)  Enalapril Maleate (Unknown)  Enalaprilat (Unknown)    Intolerances    Standing Inpatient Medications  albuterol    90 MICROgram(s) HFA Inhaler 2 Puff(s) Inhalation every 6 hours  chlorhexidine 2% Cloths 1 Application(s) Topical daily  dextrose 5%. 1000 milliLiter(s) IV Continuous <Continuous>  dextrose 5%. 1000 milliLiter(s) IV Continuous <Continuous>  dextrose 50% Injectable 25 Gram(s) IV Push once  dextrose 50% Injectable 12.5 Gram(s) IV Push once  dextrose 50% Injectable 25 Gram(s) IV Push once  famotidine    Tablet 20 milliGRAM(s) Oral daily  glucagon  Injectable 1 milliGRAM(s) IntraMuscular once  influenza  Vaccine (HIGH DOSE) 0.7 milliLiter(s) IntraMuscular once  insulin lispro (ADMELOG) corrective regimen sliding scale   SubCutaneous at bedtime  insulin lispro (ADMELOG) corrective regimen sliding scale   SubCutaneous three times a day before meals  isoniazid 300 milliGRAM(s) Oral daily  levothyroxine 125 MICROGram(s) Oral daily  methylPREDNISolone sodium succinate Injectable 60 milliGRAM(s) IV Push two times a day  methylPREDNISolone sodium succinate Injectable   IV Push   mycophenolate mofetil 1 Gram(s) Oral every 12 hours  nystatin    Suspension 875890 Unit(s) Swish and Swallow four times a day  polyethylene glycol 3350 17 Gram(s) Oral daily  pyridoxine 50 milliGRAM(s) Oral daily  senna 2 Tablet(s) Oral at bedtime  tacrolimus ER Tablet (ENVARSUS XR) 7 milliGRAM(s) Oral <User Schedule>  trimethoprim   80 mG/sulfamethoxazole 400 mG 1 Tablet(s) Oral daily  valGANciclovir 450 milliGRAM(s) Oral daily    PRN Inpatient Medications  acetaminophen     Tablet .. 650 milliGRAM(s) Oral every 6 hours PRN  dextrose Oral Gel 15 Gram(s) Oral once PRN  ondansetron Injectable 4 milliGRAM(s) IV Push every 6 hours PRN  traMADol 25 milliGRAM(s) Oral every 6 hours PRN  traMADol 50 milliGRAM(s) Oral every 8 hours PRN    REVIEW OF SYSTEMS  --------------------------------------------------------------------------------  as above    VITALS/PHYSICAL EXAM  --------------------------------------------------------------------------------  T(C): 36.4 (03-27-24 @ 05:37), Max: 36.8 (03-26-24 @ 13:00)  HR: 68 (03-27-24 @ 05:37) (68 - 80)  BP: 128/76 (03-27-24 @ 05:37) (128/76 - 145/77)  RR: 18 (03-27-24 @ 05:37) (18 - 18)  SpO2: 100% (03-27-24 @ 05:37) (99% - 100%)  Wt(kg): --    03-26-24 @ 07:01  -  03-27-24 @ 07:00  --------------------------------------------------------  IN: 2910 mL / OUT: 2720 mL / NET: 190 mL    Physical Exam:  General: NAD  HEENT: Anicteric, PERRLA, MMM  Respiratory: CTA B/L  Cardiovascular: RRR  Gastrointestinal: Soft, non distended  Transplant: mild tenderness at the incision site; incision c/d/i; MARKY with seroussanguinous discharge   : Arenas catheter  Neurological: Awake  MSK: no edema   Vascular access: +LUE AVF with thrill   Neurological: A&O x3  Skin: no rashes, warm    LABS/STUDIES  --------------------------------------------------------------------------------              8.1    8.55  >-----------<  105      [03-27-24 @ 06:53]              25.9     134  |  99  |  29  ----------------------------<  146      [03-27-24 @ 06:53]  4.2   |  24  |  2.09        Ca     8.6     [03-27-24 @ 06:53]      Mg     2.9     [03-27-24 @ 06:53]      Phos  3.3     [03-27-24 @ 06:53]    TPro  5.6  /  Alb  3.2  /  TBili  0.4  /  DBili  x   /  AST  36  /  ALT  35  /  AlkPhos  80  [03-27-24 @ 06:53]    PT/INR: PT 10.8 , INR 0.98       [03-25-24 @ 17:24]  PTT: 25.0       [03-25-24 @ 17:24]    Creatinine Trend:  SCr 2.09 [03-27 @ 06:53]  SCr 3.82 [03-26 @ 09:00]  SCr 3.41 [03-26 @ 06:48]  SCr 5.92 [03-25 @ 22:10]  SCr 7.35 [03-25 @ 16:26]    Urinalysis - [03-27-24 @ 06:53]      Color  / Appearance  / SG  / pH       Gluc 146 / Ketone   / Bili  / Urobili        Blood  / Protein  / Leuk Est  / Nitrite       RBC  / WBC  / Hyaline  / Gran  / Sq Epi  / Non Sq Epi  / Bacteria     HbA1c 7.3      [06-04-19 @ 19:02]    HBsAb <3.0      [03-25-24 @ 07:37]  HBsAg Nonreact      [03-25-24 @ 07:37]  HBcAb Nonreact      [03-25-24 @ 07:37]  HCV 0.06, Nonreact      [03-25-24 @ 07:37]  HIV Nonreact      [03-25-24 @ 07:37]

## 2024-03-27 NOTE — PROGRESS NOTE ADULT - ASSESSMENT
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 Now s/p LDRT with Simulect induction 3/25/2024    Plan:  LDRT with Simulect 3/25/2024 POD 2  - Renal US 3/25 w/ patent vessels, mildly elevated velocities at arterial anastomosis, and no significant renal artery stenosis  - Strict I/O's gerardo x1, luna  - IVF D/C  - regular diet  - pain control  - bowel regimen  - SCD/IS/ PT consult    Donor TB+  - INH/B6 treatment  - Transplant ID following    COVID+ on 3/23  - Repeat COVID PCR negative on 3/25,   - D/C remdesivir     DM2  - started 1mg Prandin TID before meals     Immunosuppression  - Simulect induction  - Envarsus by level, MMF 1g BID, prednisone taper  - PPX: Bactrim, Valcyte, nystatin 67 y/o M with past medical history significant for PMH of obesity, proteinuria related to obesity, kidney failure diagnosed in 1981, DM2 since 1992, neuropathy, scoliosis, KRYSTIAN prior to weight loss from  lab band insertion in 2010 (currently in deflated position since foot surgery in 2019), pericardial effusion (s/p pericardiocentesis), 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 Now s/p LDRT with Simulect induction 3/25/2024    Plan:  LDRT with Simulect 3/25/2024 POD 2  - Renal US 3/25 w/ patent vessels, mildly elevated velocities at arterial anastomosis, and no significant renal artery stenosis  - Strict I/O's gerardo x1, luna  - IVF D/C  - regular diet  - pain control  - bowel regimen  - SCD/IS/ PT consult    Donor TB+  - INH/B6 treatment  - Transplant ID following    COVID+ on 3/23  - Repeat COVID PCR negative on 3/25,   - D/C remdesivir     DM2  - started 1mg Prandin TID before meals     Immunosuppression  - Simulect induction  - Envarsus by level, MMF 1g BID, prednisone taper  - PPX: Bactrim, nystatin  - Valcyte d/c'd, started Acyclovir 400 mg BID

## 2024-03-27 NOTE — PROGRESS NOTE ADULT - SUBJECTIVE AND OBJECTIVE BOX
Transplant Surgery - Multidisciplinary Rounds  --------------------------------------------------------------  LDRT     Date: 03/25/2024        POD#2    Present: Present: Patient seen and examined with multidisciplinary team including transplant surgeon Dr. Borrego & fellow Dr. Roth, transplant nephrologist Dr. Santos & fellow Dr. Coley, pharmacist Thad Muñoz, PA Antonino/Sukhi, and unit RN Osiris during AM rounds. Disciplines not in attendance will be notified of the plan.     Interval Events:  - s/p LDRT   - stopped UOP repletions  - D/c remdesivir   - advanced to regular diet       Immunosuppression  Induction: Simulect  Maintenance immunosuppression: Envarsus by level, MMF 1 g BID, steroid taper, POD#4 Simulect  Ongoing monitoring for signs of rejection.    Potential discharge date: TBD      MEDICATIONS  (STANDING):  albuterol    90 MICROgram(s) HFA Inhaler 2 Puff(s) Inhalation every 6 hours  chlorhexidine 2% Cloths 1 Application(s) Topical daily  dextrose 5%. 1000 milliLiter(s) (50 mL/Hr) IV Continuous <Continuous>  dextrose 5%. 1000 milliLiter(s) (100 mL/Hr) IV Continuous <Continuous>  dextrose 50% Injectable 12.5 Gram(s) IV Push once  dextrose 50% Injectable 25 Gram(s) IV Push once  dextrose 50% Injectable 25 Gram(s) IV Push once  famotidine    Tablet 20 milliGRAM(s) Oral daily  glucagon  Injectable 1 milliGRAM(s) IntraMuscular once  influenza  Vaccine (HIGH DOSE) 0.7 milliLiter(s) IntraMuscular once  insulin lispro (ADMELOG) corrective regimen sliding scale   SubCutaneous at bedtime  insulin lispro (ADMELOG) corrective regimen sliding scale   SubCutaneous three times a day before meals  isoniazid 300 milliGRAM(s) Oral daily  levothyroxine 125 MICROGram(s) Oral daily  methylPREDNISolone sodium succinate Injectable   IV Push   methylPREDNISolone sodium succinate Injectable 60 milliGRAM(s) IV Push two times a day  mycophenolate mofetil 1 Gram(s) Oral every 12 hours  nystatin    Suspension 660900 Unit(s) Swish and Swallow four times a day  polyethylene glycol 3350 17 Gram(s) Oral daily  pyridoxine 50 milliGRAM(s) Oral daily  repaglinide 1 milliGRAM(s) Oral three times a day before meals  senna 2 Tablet(s) Oral at bedtime  trimethoprim   80 mG/sulfamethoxazole 400 mG 1 Tablet(s) Oral daily  valGANciclovir 450 milliGRAM(s) Oral daily    MEDICATIONS  (PRN):  acetaminophen     Tablet .. 650 milliGRAM(s) Oral every 6 hours PRN Mild Pain (1 - 3)  dextrose Oral Gel 15 Gram(s) Oral once PRN Blood Glucose LESS THAN 70 milliGRAM(s)/deciliter  ondansetron Injectable 4 milliGRAM(s) IV Push every 6 hours PRN Nausea and/or Vomiting  traMADol 25 milliGRAM(s) Oral every 6 hours PRN Moderate Pain (4 - 6)  traMADol 50 milliGRAM(s) Oral every 8 hours PRN Severe Pain (7 - 10)      PAST MEDICAL & SURGICAL HISTORY:  Hard of hearing  Bilateral hearing Aids      Type 2 diabetes mellitus      HTN (hypertension)      Hypothyroidism      Osteomyelitis      ESRD on dialysis      S/P partial thyroidectomy  '95  benign      H/O laparoscopic adjustable gastric banding  ' 2010      S/P cataract extraction  '08 and ' 2011   Bilateral      History of foreign body aspiration  7/2018  from Left Ear      History of amputation of toe          Vital Signs Last 24 Hrs  T(C): 37.5 (27 Mar 2024 09:00), Max: 37.5 (27 Mar 2024 09:00)  T(F): 99.5 (27 Mar 2024 09:00), Max: 99.5 (27 Mar 2024 09:00)  HR: 70 (27 Mar 2024 09:00) (68 - 80)  BP: 152/82 (27 Mar 2024 09:00) (128/76 - 152/82)  BP(mean): --  RR: 20 (27 Mar 2024 09:00) (18 - 20)  SpO2: 98% (27 Mar 2024 09:00) (98% - 100%)    Parameters below as of 27 Mar 2024 09:00  Patient On (Oxygen Delivery Method): room air        I&O's Summary    26 Mar 2024 07:01  -  27 Mar 2024 07:00  --------------------------------------------------------  IN: 2910 mL / OUT: 2720 mL / NET: 190 mL    27 Mar 2024 07:01  -  27 Mar 2024 12:49  --------------------------------------------------------  IN: 0 mL / OUT: 580 mL / NET: -580 mL                              8.1    8.55  )-----------( 105      ( 27 Mar 2024 06:53 )             25.9     03-27    134<L>  |  99  |  29<H>  ----------------------------<  146<H>  4.2   |  24  |  2.09<H>    Ca    8.6      27 Mar 2024 06:53  Phos  3.3     03-27  Mg     2.9     03-27    TPro  5.6<L>  /  Alb  3.2<L>  /  TBili  0.4  /  DBili  x   /  AST  36  /  ALT  35  /  AlkPhos  80  03-27    Tacrolimus (), Serum: 11.7 ng/mL (03-27 @ 06:53)        Review of systems  Gen: No weight changes, fatigue, fevers/chills, weakness  Skin: No rashes  Head/Eyes/Ears/Mouth: No headache; Normal hearing; Normal vision w/o blurriness; No sinus pain/discomfort, sore throat  Respiratory: No dyspnea, cough, wheezing, hemoptysis  CV: No chest pain, PND, orthopnea  GI: Mild abdominal pain at surgical incision site; denies diarrhea, constipation, nausea, vomiting, melena, hematochezia  : No increased frequency, dysuria, hematuria, nocturia  MSK: No joint pain/swelling; no back pain; no edema  Neuro: No dizziness/lightheadedness, weakness, seizures, numbness, tingling  Heme: No easy bruising or bleeding  Endo: No heat/cold intolerance  Psych: No significant nervousness, anxiety, stress, depression  All other systems were reviewed and are negative, except as noted.      PHYSICAL EXAM:  Constitutional: Well developed / well nourished  Eyes: Anicteric, PERRLA  ENMT: nc/at  Neck: supple, trachea midline  Respiratory: CTA B/L  Cardiovascular: RRR  Gastrointestinal: Soft, non distended, mild tenderness at the incision site; incision c/d/i  Genitourinary: Urinary catheter in place draining yellow urine  Extremities: SCD's in place and working bilaterally  Vascular: Palpable dp pulses bilaterally  Neurological: A&O x3  Skin: no rashes, ulcerations or lesions;  Musculoskeletal: Moving all extremities  Psychiatric: Responsive

## 2024-03-27 NOTE — DIETITIAN INITIAL EVALUATION ADULT - OTHER INFO
Cooperative/Awake/Alert
Nutrition-Related Concerns:    Renal:  -- Pt is s/p LDRT on 03/25/2024  POD#2  -- Deltasone, Simulect, Solu-medrol, Cellcept transplant meds ordered  -- BG management: Ordered for SSI and Prandin; Current A1c=5.2 % (03-26-24)  -- Urine output per flow sheets 570 ml thus far (3/27), 2,653 ml (3/26), 5,305 ml (3/25)     Infectious Disease:  -- COVID+ on 3/23; Repeat COVID PCR negative on 3/25  -- Donor with latent TB, pt ordered for Vitamin B6 and isoniazid

## 2024-03-27 NOTE — PROVIDER CONTACT NOTE (OTHER) - ASSESSMENT
Patient is asymptomatic.  CT value is 0.0
Pt A+Ox4, VSS, denies CP; no c/o pain, discomfort, or distress.

## 2024-03-27 NOTE — DIETITIAN INITIAL EVALUATION ADULT - ORAL INTAKE PTA/DIET HISTORY
Pt confirms no known food allergies/intolerances. Reports having a good appetite and PO intakes at home. Reports patient following a renal and diabetic diet at home PTA. Pt denies nausea, vomiting, diarrhea, or constipation. Denies difficulty chewing/swallowing. Denies any vitamin/mineral use at home, pt reports not taking oral nutritional supplement at home as well.

## 2024-03-27 NOTE — DIETITIAN INITIAL EVALUATION ADULT - PERTINENT LABORATORY DATA
03-27    134<L>  |  99  |  29<H>  ----------------------------<  146<H>  4.2   |  24  |  2.09<H>    Ca    8.6      27 Mar 2024 06:53  Phos  3.3     03-27  Mg     2.9     03-27    TPro  5.6<L>  /  Alb  3.2<L>  /  TBili  0.4  /  DBili  x   /  AST  36  /  ALT  35  /  AlkPhos  80  03-27    POCT Blood Glucose.: 283 mg/dL (03-27-24 @ 12:50)  POCT Blood Glucose.: 157 mg/dL (03-27-24 @ 08:39)  POCT Blood Glucose.: 209 mg/dL (03-26-24 @ 20:47)  POCT Blood Glucose.: 189 mg/dL (03-26-24 @ 17:27)    A1C with Estimated Average Glucose Result: 5.2 % (03-26-24 @ 06:48)

## 2024-03-27 NOTE — DIETITIAN INITIAL EVALUATION ADULT - PERTINENT MEDS FT
MEDICATIONS  (STANDING):  acyclovir   Oral Tab/Cap 400 milliGRAM(s) Oral two times a day  albuterol    90 MICROgram(s) HFA Inhaler 2 Puff(s) Inhalation every 6 hours  chlorhexidine 2% Cloths 1 Application(s) Topical daily  dextrose 5%. 1000 milliLiter(s) (50 mL/Hr) IV Continuous <Continuous>  dextrose 5%. 1000 milliLiter(s) (100 mL/Hr) IV Continuous <Continuous>  dextrose 50% Injectable 25 Gram(s) IV Push once  dextrose 50% Injectable 12.5 Gram(s) IV Push once  dextrose 50% Injectable 25 Gram(s) IV Push once  famotidine    Tablet 20 milliGRAM(s) Oral daily  glucagon  Injectable 1 milliGRAM(s) IntraMuscular once  influenza  Vaccine (HIGH DOSE) 0.7 milliLiter(s) IntraMuscular once  insulin lispro (ADMELOG) corrective regimen sliding scale   SubCutaneous at bedtime  insulin lispro (ADMELOG) corrective regimen sliding scale   SubCutaneous three times a day before meals  isoniazid 300 milliGRAM(s) Oral daily  levothyroxine 125 MICROGram(s) Oral daily  methylPREDNISolone sodium succinate Injectable   IV Push   methylPREDNISolone sodium succinate Injectable 60 milliGRAM(s) IV Push two times a day  mycophenolate mofetil 1 Gram(s) Oral every 12 hours  nystatin    Suspension 425312 Unit(s) Swish and Swallow four times a day  polyethylene glycol 3350 17 Gram(s) Oral daily  pyridoxine 50 milliGRAM(s) Oral daily  repaglinide 1 milliGRAM(s) Oral three times a day before meals  senna 2 Tablet(s) Oral at bedtime  trimethoprim   80 mG/sulfamethoxazole 400 mG 1 Tablet(s) Oral daily    MEDICATIONS  (PRN):  acetaminophen     Tablet .. 650 milliGRAM(s) Oral every 6 hours PRN Mild Pain (1 - 3)  dextrose Oral Gel 15 Gram(s) Oral once PRN Blood Glucose LESS THAN 70 milliGRAM(s)/deciliter  ondansetron Injectable 4 milliGRAM(s) IV Push every 6 hours PRN Nausea and/or Vomiting  traMADol 25 milliGRAM(s) Oral every 6 hours PRN Moderate Pain (4 - 6)  traMADol 50 milliGRAM(s) Oral every 8 hours PRN Severe Pain (7 - 10)

## 2024-03-28 ENCOUNTER — TRANSCRIPTION ENCOUNTER (OUTPATIENT)
Age: 69
End: 2024-03-28

## 2024-03-28 LAB
ALBUMIN SERPL ELPH-MCNC: 3.4 G/DL — SIGNIFICANT CHANGE UP (ref 3.3–5)
ALP SERPL-CCNC: 91 U/L — SIGNIFICANT CHANGE UP (ref 40–120)
ALT FLD-CCNC: 41 U/L — SIGNIFICANT CHANGE UP (ref 10–45)
ANION GAP SERPL CALC-SCNC: 11 MMOL/L — SIGNIFICANT CHANGE UP (ref 5–17)
AST SERPL-CCNC: 31 U/L — SIGNIFICANT CHANGE UP (ref 10–40)
BASOPHILS # BLD AUTO: 0.01 K/UL — SIGNIFICANT CHANGE UP (ref 0–0.2)
BASOPHILS NFR BLD AUTO: 0.1 % — SIGNIFICANT CHANGE UP (ref 0–2)
BILIRUB SERPL-MCNC: 0.3 MG/DL — SIGNIFICANT CHANGE UP (ref 0.2–1.2)
BUN SERPL-MCNC: 25 MG/DL — HIGH (ref 7–23)
CALCIUM SERPL-MCNC: 9.3 MG/DL — SIGNIFICANT CHANGE UP (ref 8.4–10.5)
CHLORIDE SERPL-SCNC: 98 MMOL/L — SIGNIFICANT CHANGE UP (ref 96–108)
CO2 SERPL-SCNC: 23 MMOL/L — SIGNIFICANT CHANGE UP (ref 22–31)
CREAT SERPL-MCNC: 1.24 MG/DL — SIGNIFICANT CHANGE UP (ref 0.5–1.3)
EGFR: 64 ML/MIN/1.73M2 — SIGNIFICANT CHANGE UP
EOSINOPHIL # BLD AUTO: 0 K/UL — SIGNIFICANT CHANGE UP (ref 0–0.5)
EOSINOPHIL NFR BLD AUTO: 0 % — SIGNIFICANT CHANGE UP (ref 0–6)
GLUCOSE BLDC GLUCOMTR-MCNC: 134 MG/DL — HIGH (ref 70–99)
GLUCOSE BLDC GLUCOMTR-MCNC: 192 MG/DL — HIGH (ref 70–99)
GLUCOSE BLDC GLUCOMTR-MCNC: 211 MG/DL — HIGH (ref 70–99)
GLUCOSE BLDC GLUCOMTR-MCNC: 259 MG/DL — HIGH (ref 70–99)
GLUCOSE SERPL-MCNC: 210 MG/DL — HIGH (ref 70–99)
HCT VFR BLD CALC: 25.6 % — LOW (ref 39–50)
HGB BLD-MCNC: 8.4 G/DL — LOW (ref 13–17)
IMM GRANULOCYTES NFR BLD AUTO: 1 % — HIGH (ref 0–0.9)
LYMPHOCYTES # BLD AUTO: 0.33 K/UL — LOW (ref 1–3.3)
LYMPHOCYTES # BLD AUTO: 3.4 % — LOW (ref 13–44)
MAGNESIUM SERPL-MCNC: 2.4 MG/DL — SIGNIFICANT CHANGE UP (ref 1.6–2.6)
MCHC RBC-ENTMCNC: 32.8 GM/DL — SIGNIFICANT CHANGE UP (ref 32–36)
MCHC RBC-ENTMCNC: 34.9 PG — HIGH (ref 27–34)
MCV RBC AUTO: 106.2 FL — HIGH (ref 80–100)
MONOCYTES # BLD AUTO: 0.4 K/UL — SIGNIFICANT CHANGE UP (ref 0–0.9)
MONOCYTES NFR BLD AUTO: 4.2 % — SIGNIFICANT CHANGE UP (ref 2–14)
NEUTROPHILS # BLD AUTO: 8.78 K/UL — HIGH (ref 1.8–7.4)
NEUTROPHILS NFR BLD AUTO: 91.3 % — HIGH (ref 43–77)
NRBC # BLD: 0 /100 WBCS — SIGNIFICANT CHANGE UP (ref 0–0)
PHOSPHATE SERPL-MCNC: 2 MG/DL — LOW (ref 2.5–4.5)
PLATELET # BLD AUTO: 121 K/UL — LOW (ref 150–400)
POTASSIUM SERPL-MCNC: 4.9 MMOL/L — SIGNIFICANT CHANGE UP (ref 3.5–5.3)
POTASSIUM SERPL-SCNC: 4.9 MMOL/L — SIGNIFICANT CHANGE UP (ref 3.5–5.3)
PROT SERPL-MCNC: 6.2 G/DL — SIGNIFICANT CHANGE UP (ref 6–8.3)
RBC # BLD: 2.41 M/UL — LOW (ref 4.2–5.8)
RBC # FLD: 13.9 % — SIGNIFICANT CHANGE UP (ref 10.3–14.5)
SODIUM SERPL-SCNC: 132 MMOL/L — LOW (ref 135–145)
TACROLIMUS SERPL-MCNC: 12.7 NG/ML — SIGNIFICANT CHANGE UP
WBC # BLD: 9.62 K/UL — SIGNIFICANT CHANGE UP (ref 3.8–10.5)
WBC # FLD AUTO: 9.62 K/UL — SIGNIFICANT CHANGE UP (ref 3.8–10.5)

## 2024-03-28 PROCEDURE — 99233 SBSQ HOSP IP/OBS HIGH 50: CPT | Mod: GC

## 2024-03-28 RX ORDER — PREDNISONE 5 MG/1
5 TABLET ORAL
Qty: 30 | Refills: 11 | Status: ACTIVE | COMMUNITY
Start: 2024-03-28 | End: 1900-01-01

## 2024-03-28 RX ORDER — TACROLIMUS 5 MG/1
5 CAPSULE ORAL ONCE
Refills: 0 | Status: COMPLETED | OUTPATIENT
Start: 2024-03-28 | End: 2024-03-28

## 2024-03-28 RX ORDER — TACROLIMUS 5 MG/1
5 CAPSULE ORAL
Refills: 0 | Status: DISCONTINUED | OUTPATIENT
Start: 2024-03-29 | End: 2024-03-29

## 2024-03-28 RX ADMIN — REPAGLINIDE 1 MILLIGRAM(S): 1 TABLET ORAL at 17:38

## 2024-03-28 RX ADMIN — MYCOPHENOLATE MOFETIL 1 GRAM(S): 250 CAPSULE ORAL at 21:07

## 2024-03-28 RX ADMIN — Medication 500000 UNIT(S): at 00:12

## 2024-03-28 RX ADMIN — MYCOPHENOLATE MOFETIL 1 GRAM(S): 250 CAPSULE ORAL at 08:46

## 2024-03-28 RX ADMIN — Medication 300 MILLIGRAM(S): at 11:34

## 2024-03-28 RX ADMIN — Medication 500000 UNIT(S): at 11:32

## 2024-03-28 RX ADMIN — Medication 30 MILLIGRAM(S): at 05:15

## 2024-03-28 RX ADMIN — Medication 1 TABLET(S): at 11:33

## 2024-03-28 RX ADMIN — Medication 400 MILLIGRAM(S): at 17:38

## 2024-03-28 RX ADMIN — Medication 12.5 MILLIGRAM(S): at 17:38

## 2024-03-28 RX ADMIN — REPAGLINIDE 1 MILLIGRAM(S): 1 TABLET ORAL at 11:34

## 2024-03-28 RX ADMIN — Medication 500000 UNIT(S): at 05:13

## 2024-03-28 RX ADMIN — Medication 4: at 12:50

## 2024-03-28 RX ADMIN — Medication 125 MICROGRAM(S): at 21:07

## 2024-03-28 RX ADMIN — Medication 400 MILLIGRAM(S): at 05:13

## 2024-03-28 RX ADMIN — Medication 2: at 08:46

## 2024-03-28 RX ADMIN — TRAMADOL HYDROCHLORIDE 50 MILLIGRAM(S): 50 TABLET ORAL at 12:14

## 2024-03-28 RX ADMIN — Medication 500000 UNIT(S): at 17:38

## 2024-03-28 RX ADMIN — FAMOTIDINE 20 MILLIGRAM(S): 10 INJECTION INTRAVENOUS at 11:33

## 2024-03-28 RX ADMIN — REPAGLINIDE 1 MILLIGRAM(S): 1 TABLET ORAL at 05:13

## 2024-03-28 RX ADMIN — Medication 12.5 MILLIGRAM(S): at 05:13

## 2024-03-28 RX ADMIN — TACROLIMUS 5 MILLIGRAM(S): 5 CAPSULE ORAL at 11:32

## 2024-03-28 RX ADMIN — TRAMADOL HYDROCHLORIDE 50 MILLIGRAM(S): 50 TABLET ORAL at 11:32

## 2024-03-28 RX ADMIN — Medication 30 MILLIGRAM(S): at 17:37

## 2024-03-28 RX ADMIN — TRAMADOL HYDROCHLORIDE 50 MILLIGRAM(S): 50 TABLET ORAL at 19:44

## 2024-03-28 RX ADMIN — Medication 500000 UNIT(S): at 23:37

## 2024-03-28 RX ADMIN — POLYETHYLENE GLYCOL 3350 17 GRAM(S): 17 POWDER, FOR SOLUTION ORAL at 11:33

## 2024-03-28 RX ADMIN — Medication 50 MILLIGRAM(S): at 11:33

## 2024-03-28 RX ADMIN — Medication 85 MILLIMOLE(S): at 09:07

## 2024-03-28 RX ADMIN — Medication 2: at 21:27

## 2024-03-28 RX ADMIN — CHLORHEXIDINE GLUCONATE 1 APPLICATION(S): 213 SOLUTION TOPICAL at 12:51

## 2024-03-28 RX ADMIN — TRAMADOL HYDROCHLORIDE 50 MILLIGRAM(S): 50 TABLET ORAL at 20:50

## 2024-03-28 NOTE — PROGRESS NOTE ADULT - SUBJECTIVE AND OBJECTIVE BOX
[facility] DIVISION OF KIDNEY DISEASES AND HYPERTENSION -- FOLLOW UP NOTE  --------------------------------------------------------------------------------  Chief Complaint: s/p renal transplant     24 hour events/subjective: Pt seen and examined this AM. Pt reports doing well. No complaints. Denies any headaches, nausea, vomiting, fevers/chills, chest pain, SOB, and leg swelling.    PAST HISTORY  --------------------------------------------------------------------------------  No significant changes to PMH, PSH, FHx, SHx, unless otherwise noted    ALLERGIES & MEDICATIONS  --------------------------------------------------------------------------------  Allergies    capsicum (Unknown)  Novocain (Other)  Vasotec (Unknown)  MetFORMIN Hydrochloride (Unknown)  aspirin (Unknown)  Enalapril Maleate (Unknown)  Enalaprilat (Unknown)    Intolerances    Standing Inpatient Medications  acyclovir   Oral Tab/Cap 400 milliGRAM(s) Oral two times a day  albuterol    90 MICROgram(s) HFA Inhaler 2 Puff(s) Inhalation every 6 hours  chlorhexidine 2% Cloths 1 Application(s) Topical daily  dextrose 5%. 1000 milliLiter(s) IV Continuous <Continuous>  dextrose 5%. 1000 milliLiter(s) IV Continuous <Continuous>  dextrose 50% Injectable 12.5 Gram(s) IV Push once  dextrose 50% Injectable 25 Gram(s) IV Push once  dextrose 50% Injectable 25 Gram(s) IV Push once  famotidine    Tablet 20 milliGRAM(s) Oral daily  glucagon  Injectable 1 milliGRAM(s) IntraMuscular once  influenza  Vaccine (HIGH DOSE) 0.7 milliLiter(s) IntraMuscular once  insulin lispro (ADMELOG) corrective regimen sliding scale   SubCutaneous at bedtime  insulin lispro (ADMELOG) corrective regimen sliding scale   SubCutaneous three times a day before meals  isoniazid 300 milliGRAM(s) Oral daily  levothyroxine 125 MICROGram(s) Oral daily  methylPREDNISolone sodium succinate Injectable 30 milliGRAM(s) IV Push two times a day  methylPREDNISolone sodium succinate Injectable   IV Push   metoprolol tartrate 12.5 milliGRAM(s) Oral two times a day  mycophenolate mofetil 1 Gram(s) Oral every 12 hours  nystatin    Suspension 579251 Unit(s) Swish and Swallow four times a day  polyethylene glycol 3350 17 Gram(s) Oral daily  pyridoxine 50 milliGRAM(s) Oral daily  repaglinide 1 milliGRAM(s) Oral three times a day before meals  senna 2 Tablet(s) Oral at bedtime  trimethoprim   80 mG/sulfamethoxazole 400 mG 1 Tablet(s) Oral daily    PRN Inpatient Medications  acetaminophen     Tablet .. 650 milliGRAM(s) Oral every 6 hours PRN  dextrose Oral Gel 15 Gram(s) Oral once PRN  ondansetron Injectable 4 milliGRAM(s) IV Push every 6 hours PRN  traMADol 25 milliGRAM(s) Oral every 6 hours PRN  traMADol 50 milliGRAM(s) Oral every 8 hours PRN    REVIEW OF SYSTEMS  --------------------------------------------------------------------------------  as above    VITALS/PHYSICAL EXAM  --------------------------------------------------------------------------------  T(C): 36.6 (03-28-24 @ 09:07), Max: 37.6 (03-27-24 @ 13:13)  HR: 68 (03-28-24 @ 09:07) (63 - 97)  BP: 162/97 (03-28-24 @ 09:07) (138/80 - 169/96)  RR: 20 (03-28-24 @ 09:07) (18 - 20)  SpO2: 100% (03-28-24 @ 09:07) (99% - 100%)  Wt(kg): --    03-27-24 @ 07:01  -  03-28-24 @ 07:00  --------------------------------------------------------  IN: 1000 mL / OUT: 2595 mL / NET: -1595 mL    03-28-24 @ 07:01  -  03-28-24 @ 11:44  --------------------------------------------------------  IN: 1020 mL / OUT: 368 mL / NET: 652 mL    Physical Exam:  General: NAD  HEENT: Anicteric, PERRLA, MMM  Respiratory: CTA B/L  Cardiovascular: RRR  Gastrointestinal: Soft, non distended  Transplant: mild tenderness at the incision site; incision c/d/i; MARKY with seroussanguinous discharge   : Arenas catheter  Neurological: Awake  MSK: no edema   Vascular access: +LUE AVF with thrill   Neurological: A&O x3  Skin: no rashes, warm    LABS/STUDIES  --------------------------------------------------------------------------------              8.4    9.62  >-----------<  121      [03-28-24 @ 07:15]              25.6     132  |  98  |  25  ----------------------------<  210      [03-28-24 @ 07:17]  4.9   |  23  |  1.24        Ca     9.3     [03-28-24 @ 07:17]      Mg     2.4     [03-28-24 @ 07:17]      Phos  2.0     [03-28-24 @ 07:17]    TPro  6.2  /  Alb  3.4  /  TBili  0.3  /  DBili  x   /  AST  31  /  ALT  41  /  AlkPhos  91  [03-28-24 @ 07:17]    Creatinine Trend:  SCr 1.24 [03-28 @ 07:17]  SCr 2.09 [03-27 @ 06:53]  SCr 3.82 [03-26 @ 09:00]  SCr 3.41 [03-26 @ 06:48]  SCr 5.92 [03-25 @ 22:10]    Tacrolimus (), Serum: 12.7 ng/mL (03-28 @ 07:16)  Tacrolimus (), Serum: 11.7 ng/mL (03-27 @ 06:53)    Urinalysis - [03-28-24 @ 07:17]      Color  / Appearance  / SG  / pH       Gluc 210 / Ketone   / Bili  / Urobili        Blood  / Protein  / Leuk Est  / Nitrite       RBC  / WBC  / Hyaline  / Gran  / Sq Epi  / Non Sq Epi  / Bacteria     HbA1c 7.3      [06-04-19 @ 19:02]    HBsAb <3.0      [03-25-24 @ 07:37]  HBsAg Nonreact      [03-25-24 @ 07:37]  HBcAb Nonreact      [03-25-24 @ 07:37]  HCV 0.06, Nonreact      [03-25-24 @ 07:37]  HIV Nonreact      [03-25-24 @ 07:37]

## 2024-03-28 NOTE — DISCHARGE NOTE PROVIDER - CARE PROVIDER_API CALL
Speedy Rosenberg.  Surgery  [address] [place] [id number]  Phone: [phone]  Fax: [phone]  Follow Up Time: 1 week

## 2024-03-28 NOTE — PROGRESS NOTE ADULT - ASSESSMENT
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 Now s/p LDRT with Simulect induction 3/25/2024    Plan:  LDRT with Simulect 3/25/2024 POD #3  - Renal US 3/25 w/ patent vessels, mildly elevated velocities at arterial anastomosis, and no significant renal artery stenosis  - Strict I/O's gerardo x1, luna (to be removed on POD#4 per Dr. Rosenberg)  - regular diet  - pain control  - bowel regimen  - SCD/IS/ PT consult  - Simulect on POD#4    Donor TB+  - INH/B6 treatment  - Transplant ID following    COVID+ on 3/23  - Repeat COVID PCR negative on 3/25  - Remdesivir dc'ed    DM2  - continue 1mg Prandin TID before meals   - monitor FS    Immunosuppression  - Simulect induction  - Envarsus by level, MMF 1g BID, prednisone taper  - PPX: Bactrim, nystatin, acyclovir 400 mg BID     Dispo: possible d/c home tomorrow

## 2024-03-28 NOTE — DISCHARGE NOTE PROVIDER - NSDCMRMEDTOKEN_GEN_ALL_CORE_FT
Albuterol (Eqv-Proventil HFA) 90 mcg/inh inhalation aerosol: 2 puff(s) inhaled every 6 hours, As Needed  biotin 2.5 mg oral tablet: 1 tab(s) orally once a day  folic acid 1 mg oral tablet: 1 tab(s) orally once a day  levothyroxine 125 mcg (0.125 mg) oral capsule: 1 cap(s) orally once a day (at bedtime)  metoprolol tartrate 50 mg oral tablet: 1 tab(s) orally 2 times a day  Rolling Walker: Use rolling walker as instructed  sevelamer carbonate 800 mg oral tablet: 1 tab(s) orally 2 times a day  sodium bicarbonate 10 grains pm:   Trelegy Ellipta 200 mcg-62.5 mcg-25 mcg/inh inhalation powder: 1 puff(s) inhaled once a day (at bedtime)   acyclovir 400 mg oral tablet: 1 tab(s) orally 2 times a day  Albuterol (Eqv-Proventil HFA) 90 mcg/inh inhalation aerosol: 2 puff(s) inhaled every 6 hours, As Needed  famotidine 20 mg oral tablet: 1 tab(s) orally once a day  levothyroxine 125 mcg (0.125 mg) oral capsule: 1 cap(s) orally once a day (at bedtime)  metoprolol tartrate 25 mg oral tablet: 1 tab(s) orally 2 times a day please take half tablet per dose  mycophenolate mofetil 250 mg oral capsule: 1,000 milligram(s) orally 2 times a day  nystatin 100,000 units/mL oral suspension: 5 milliliter(s) orally 4 times a day  predniSONE: 5 milligram(s) orally once a day Please follow the taper provided by the transplant team  pyridoxine 50 mg oral tablet: 1 tab(s) orally once a day  repaglinide 1 mg oral tablet: 1 tab(s) orally 3 times a day (before meals)  Rolling Walker: Use rolling walker as instructed  senna leaf extract oral tablet: 2 tab(s) orally once a day (at bedtime)  sulfamethoxazole-trimethoprim 400 mg-80 mg oral tablet: 1 tab(s) orally once a day  tacrolimus 1 mg oral tablet, extended release: 5 tab(s) orally once a day

## 2024-03-28 NOTE — DISCHARGE NOTE PROVIDER - NSDCCPCAREPLAN_GEN_ALL_CORE_FT
PRINCIPAL DISCHARGE DIAGNOSIS  Diagnosis: Renal transplant recipient  Assessment and Plan of Treatment:   Renal txp recipient:   - Avoid heavy lifting anything over 5lbs for six weeks following your surgery date. Do NOT drive a car or operate machinery unless cleared by your transplant surgeon during your follow up visit. Avoid straining, use stool softeners as needed. Stop stool softeners if diarrhea develops.   - Call transplant clinic if you develop fever, increased abdominal pain, redness/swelling or bleeding around your incision site  - Call transplant clinic if you have difficulty urinating, notice decrease in urine output or blood in urine.   - Follow FOOD SAFETY instruction provided to you in your patient education guide booklet   - Bathing: Shower is allowed, you can let soap and water flow over your incision; do NOT rub the area. Bath is NOT allowed until your incision is healed and you have been cleared by your transplant surgeon.   Immunosuppression  - Transplant recipients are at risk for developing infection because immune system is lowered due to transplant medications.   - Avoid contact with sick people; practice good hand hygiene;   - Take medications as directed by your transplant team. Never stop taking medications unless instructed by your transplant physician.  - Do NOT double up medication dose if you missed your dose; call the transplant office for instructions.    HTN  Be sure to follow a low salt diet, avoid caffeine, reduce alcohol intake.  If you have been prescribed antihypertensive medications to control your blood pressure, be sure to take them every day as prescribed and do not miss any doses, the medications do not work if they are not taken consistently. Follow up with your Primary Care Doctor and have your Blood Pressure checked regularly.   DM  If you have diabetes, you may need to monitor your blood sugar more frequently after transplant. Uncontrolled blood sugar levels can lead to poor wound healing and other complications. Follow a low carb and low sugar diet. Continue to take all anti-diabetic medications/insulin as prescribed. Follow up wit

## 2024-03-28 NOTE — PROGRESS NOTE ADULT - ATTENDING COMMENTS
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   CMV D-/R+, EBV D+/R+,  HLA mismatch 2, 2, 2. No DSA.     Pt had asymptomatic COVID +ve infection on pre op testing, repeat ngative after admission, remdesevir d/sal   Donor has latent TB, recipient started on INH/B6   Doing well, good UOP, cr falling     S/p LURD transplant 3/25/24   Good graft function, Cr falling, Post op US reviewed    Anemia - multifactorial - CKD, surgery, medications. Hct relatively stable     Immunosuppression   Simulect induction, steroid taper per protocol    Received Envarsus 7mg yesterday level today 12.5. Reduce dose to 5mg- aim for trough 8-10   MMF 1 gram po bid     Infection prophylaxis   Nystatin, bactrim ss daily, valcyte (CMV intermediate risk)   INH/B6 for donor with latent TB       Diabetes II- continue ADMELOG sliding scale and Prandin 1mg po TID with meals   HTN and h/o arrythmia -continue metoprolol 12.5mg po bid   Hypothyroidism - continue synthroid.  D/c planning home tomorrow 68 year old man with ESRD on HD since 6/2022, received LURD kidney transplant from a 32 year old altrustic donor on 3/25/24  PMH: CKD and proteinuria related to obesity (?FSGS) dx in 1985, Biopsy in 1989, Obesity lap band in 2010 with 150lbs weight loss, deflated since 2019, DM dx 1992 never on insulin, HLD, Hypothyroidism, Portal HTN and liver fibrosis likely duet o metabolic d/o associated steatotic liver disease, PVD s/p left toe amputation 2019, KRYSTIAN improved after weight loss, h/o pericardial effusion s/p pericardiocentesis 11/2022, left forearm AV fistula.   CMV D-/R-, EBV D+/R+,  HLA mismatch 2, 2, 2. No DSA.     Pt had asymptomatic COVID +ve infection on pre op testing, repeat ngative after admission, remdesevir d/sal   Donor has latent TB, recipient started on INH/B6   Doing well, good UOP, cr falling     S/p LURD transplant 3/25/24   Good graft function, Cr falling, Post op US reviewed    Anemia - multifactorial - CKD, surgery, medications. Hct relatively stable     Immunosuppression   Simulect induction, steroid taper per protocol    Received Envarsus 7mg yesterday level today 12.5. Reduce dose to 5mg- aim for trough 8-10   MMF 1 gram po bid     Infection prophylaxis   Nystatin, bactrim ss daily, acyclovir for 3 months (CMV low risk)  INH/B6 for donor with latent TB       Diabetes II- continue ADMELOG sliding scale and Prandin 1mg po TID with meals   HTN and h/o arrythmia -continue metoprolol 12.5mg po bid   Hypothyroidism - continue synthroid.  D/c planning home tomorrow

## 2024-03-28 NOTE — DISCHARGE NOTE PROVIDER - NSDCFUSCHEDAPPT_GEN_ALL_CORE_FT
Adriana Burrell  [facility]  NEPHRO [address] [place] [address]  Scheduled Appointment: 06/20/2024

## 2024-03-28 NOTE — PROGRESS NOTE ADULT - ASSESSMENT
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 Now s/p LDRT with Simulect induction 3/25/2024    1. s/p LDRT with Simulect induction 3/25/2024  - Pt with improving renal function Scr 1.24 today and 24hr UOP ~2.5L. No further indication for HD.   - Post op US w/ elevated velocities otherwise patent vessels  - Avoid hypotension, keep SYS BP >130  - Monito labs, UOP and VS    2. Immunosuppression:  - Simulect induction  - Envarsus held today due to elevated level. Will follow level in the morning. Monitor daily tacro level.  - Cellcept 1g BID, pred taper  - PPX: bactrim, valcyte, nystatin    3. Infection - Covid, and donor with latent TB  - INH/B6 treatment  - per transplant ID    4. DVT ppx - Hep subq    If you have any questions, please feel free to contact me  Natalie Coley  Nephrology Fellow  [email or web address]  (After 5pm or on weekends please page the on-call fellow)

## 2024-03-28 NOTE — DISCHARGE NOTE PROVIDER - HOSPITAL COURSE
68M PMH: CKD and proteinuria related to obesity (?FSGS) dx in 1985, Biopsy in 1989, Obesity lap band in 2010 with 150lbs weight loss, deflated since 2019, DM dx 1992 never on insulin, HLD, Hypothyroidism, Portal HTN and liver fibrosis likely duet o metabolic d/o associated steatotic liver disease, PVD s/p left toe amputation 2019, KRYSTIAN improved after weight loss, h/o pericardial effusion s/p pericardiocentesis 11/2022, ESRD on HD since 6/2022 left forearm AV fistula. Now s/p  LURD kidney transplant from a 32 year old altrustic donor on 3/25/24.   CMV D-/R-, EBV D+/R+,  HLA mismatch 2, 2, 2. No DSA.   Pt had asymptomatic COVID +ve infection on pre op testing, repeat negative after admission, remdesevir d/sal per TID.  Donor has latent TB, recipient started on INH/B6 and will continue for 9 months. Needs bi-weekly CMP.  Good graft function, cr trending down, today 1.13, good urine output.    Immunosuppression:   Simulect induction completed  Envarsus 5mg QD, MMF 1g BID, Pred taper  ppx: Nystatin, bactrim ss daily, acyclovir for 3 months (CMV low risk)  INH/B6 for donor with latent TB     Diabetes II- Prandin 1mg po TID with meals   HTN and h/o arrythmia -continue metoprolol 12.5mg po bid   Hypothyroidism - continue synthroid.    Patient is deemed stable for discharge by the transplant team.  Follow up with transplant clinic in one week.  Follow up in 4-6 weeks for ureteral stent removal.

## 2024-03-28 NOTE — PROGRESS NOTE ADULT - SUBJECTIVE AND OBJECTIVE BOX
Transplant Surgery - Multidisciplinary Rounds  --------------------------------------------------------------  LDRT  Date: 03/25/2024  POD#3    Present: Present: Patient seen and examined with multidisciplinary team including transplant surgeon Dr. Borrego, transplant nephrologist Dr. Santos & fellow Dr. Coley, pharmacist Thad Muñoz, RANDY Morel, and unit RN during AM rounds. Disciplines not in attendance will be notified of the plan.     Interval Events:  - s/p LDRT POD#3  - resumed prandin 1 mg TID with meals  - started on Lopressor 12.5 mg BID  - no overnight events, UOP 2.53L      Immunosuppression  Induction: Simulect  Maintenance immunosuppression: Envarsus by level, MMF 1 g BID, steroid taper, POD#4 Simulect  Ongoing monitoring for signs of rejection.    Potential discharge date: TBD      MEDICATIONS  (STANDING):  acyclovir   Oral Tab/Cap 400 milliGRAM(s) Oral two times a day  albuterol    90 MICROgram(s) HFA Inhaler 2 Puff(s) Inhalation every 6 hours  chlorhexidine 2% Cloths 1 Application(s) Topical daily  dextrose 5%. 1000 milliLiter(s) (50 mL/Hr) IV Continuous <Continuous>  dextrose 5%. 1000 milliLiter(s) (100 mL/Hr) IV Continuous <Continuous>  dextrose 50% Injectable 12.5 Gram(s) IV Push once  dextrose 50% Injectable 25 Gram(s) IV Push once  dextrose 50% Injectable 25 Gram(s) IV Push once  famotidine    Tablet 20 milliGRAM(s) Oral daily  glucagon  Injectable 1 milliGRAM(s) IntraMuscular once  influenza  Vaccine (HIGH DOSE) 0.7 milliLiter(s) IntraMuscular once  insulin lispro (ADMELOG) corrective regimen sliding scale   SubCutaneous at bedtime  insulin lispro (ADMELOG) corrective regimen sliding scale   SubCutaneous three times a day before meals  isoniazid 300 milliGRAM(s) Oral daily  levothyroxine 125 MICROGram(s) Oral daily  methylPREDNISolone sodium succinate Injectable   IV Push   methylPREDNISolone sodium succinate Injectable 30 milliGRAM(s) IV Push two times a day  metoprolol tartrate 12.5 milliGRAM(s) Oral two times a day  mycophenolate mofetil 1 Gram(s) Oral every 12 hours  nystatin    Suspension 877334 Unit(s) Swish and Swallow four times a day  polyethylene glycol 3350 17 Gram(s) Oral daily  pyridoxine 50 milliGRAM(s) Oral daily  repaglinide 1 milliGRAM(s) Oral three times a day before meals  senna 2 Tablet(s) Oral at bedtime  trimethoprim   80 mG/sulfamethoxazole 400 mG 1 Tablet(s) Oral daily    MEDICATIONS  (PRN):  acetaminophen     Tablet .. 650 milliGRAM(s) Oral every 6 hours PRN Mild Pain (1 - 3)  dextrose Oral Gel 15 Gram(s) Oral once PRN Blood Glucose LESS THAN 70 milliGRAM(s)/deciliter  ondansetron Injectable 4 milliGRAM(s) IV Push every 6 hours PRN Nausea and/or Vomiting  traMADol 25 milliGRAM(s) Oral every 6 hours PRN Moderate Pain (4 - 6)  traMADol 50 milliGRAM(s) Oral every 8 hours PRN Severe Pain (7 - 10)      PAST MEDICAL & SURGICAL HISTORY:  Hard of hearing  Bilateral hearing Aids      Type 2 diabetes mellitus      HTN (hypertension)      Hypothyroidism      Osteomyelitis      ESRD on dialysis      S/P partial thyroidectomy  '95  benign      H/O laparoscopic adjustable gastric banding  ' 2010      S/P cataract extraction  '08 and ' 2011   Bilateral      History of foreign body aspiration  7/2018  from Left Ear      History of amputation of toe          Vital Signs Last 24 Hrs  T(C): 36.6 (28 Mar 2024 09:07), Max: 37.6 (27 Mar 2024 13:13)  T(F): 97.9 (28 Mar 2024 09:07), Max: 99.7 (27 Mar 2024 13:13)  HR: 68 (28 Mar 2024 09:07) (63 - 97)  BP: 162/97 (28 Mar 2024 09:07) (138/80 - 169/96)  BP(mean): 108 (27 Mar 2024 20:22) (108 - 108)  RR: 20 (28 Mar 2024 09:07) (18 - 20)  SpO2: 100% (28 Mar 2024 09:07) (99% - 100%)    Parameters below as of 28 Mar 2024 09:07  Patient On (Oxygen Delivery Method): room air        I&O's Summary    27 Mar 2024 07:01  -  28 Mar 2024 07:00  --------------------------------------------------------  IN: 1000 mL / OUT: 2595 mL / NET: -1595 mL    28 Mar 2024 07:01  -  28 Mar 2024 13:11  --------------------------------------------------------  IN: 1120 mL / OUT: 575 mL / NET: 545 mL                              8.4    9.62  )-----------( 121      ( 28 Mar 2024 07:15 )             25.6     03-28    132<L>  |  98  |  25<H>  ----------------------------<  210<H>  4.9   |  23  |  1.24    Ca    9.3      28 Mar 2024 07:17  Phos  2.0     03-28  Mg     2.4     03-28    TPro  6.2  /  Alb  3.4  /  TBili  0.3  /  DBili  x   /  AST  31  /  ALT  41  /  AlkPhos  91  03-28    Tacrolimus (), Serum: 12.7 ng/mL (03-28 @ 07:16)      Review of systems  Gen: No weight changes, fatigue, fevers/chills, weakness  Skin: No rashes  Head/Eyes/Ears/Mouth: No headache; Normal hearing; Normal vision w/o blurriness; No sinus pain/discomfort, sore throat  Respiratory: No dyspnea, cough, wheezing, hemoptysis  CV: No chest pain, PND, orthopnea  GI: Mild abdominal pain at surgical incision site; denies diarrhea, constipation, nausea, vomiting, melena, hematochezia  : No increased frequency, dysuria, hematuria, nocturia  MSK: No joint pain/swelling; no back pain; no edema  Neuro: No dizziness/lightheadedness, weakness, seizures, numbness, tingling  Heme: No easy bruising or bleeding  Endo: No heat/cold intolerance  Psych: No significant nervousness, anxiety, stress, depression  All other systems were reviewed and are negative, except as noted.      PHYSICAL EXAM:  Constitutional: Well developed / well nourished  Eyes: Anicteric, PERRLA  ENMT: nc/at  Neck: supple, trachea midline  Respiratory: CTA B/L  Cardiovascular: RRR  Gastrointestinal: Soft, non distended, mild tenderness at the incision site; incision c/d/i  Genitourinary: Urinary catheter in place draining yellow urine  Extremities: SCD's in place and working bilaterally  Vascular: Palpable dp pulses bilaterally  Neurological: A&O x3  Skin: no rashes, ulcerations or lesions;  Musculoskeletal: Moving all extremities  Psychiatric: Responsive

## 2024-03-29 ENCOUNTER — TRANSCRIPTION ENCOUNTER (OUTPATIENT)
Age: 69
End: 2024-03-29

## 2024-03-29 VITALS — HEART RATE: 78 BPM | DIASTOLIC BLOOD PRESSURE: 81 MMHG | SYSTOLIC BLOOD PRESSURE: 150 MMHG

## 2024-03-29 LAB
ALBUMIN SERPL ELPH-MCNC: 3.2 G/DL — LOW (ref 3.3–5)
ALP SERPL-CCNC: 95 U/L — SIGNIFICANT CHANGE UP (ref 40–120)
ALT FLD-CCNC: 52 U/L — HIGH (ref 10–45)
ANION GAP SERPL CALC-SCNC: 10 MMOL/L — SIGNIFICANT CHANGE UP (ref 5–17)
AST SERPL-CCNC: 30 U/L — SIGNIFICANT CHANGE UP (ref 10–40)
BASOPHILS # BLD AUTO: 0 K/UL — SIGNIFICANT CHANGE UP (ref 0–0.2)
BASOPHILS NFR BLD AUTO: 0 % — SIGNIFICANT CHANGE UP (ref 0–2)
BILIRUB SERPL-MCNC: 0.3 MG/DL — SIGNIFICANT CHANGE UP (ref 0.2–1.2)
BUN SERPL-MCNC: 29 MG/DL — HIGH (ref 7–23)
CALCIUM SERPL-MCNC: 8.8 MG/DL — SIGNIFICANT CHANGE UP (ref 8.4–10.5)
CHLORIDE SERPL-SCNC: 100 MMOL/L — SIGNIFICANT CHANGE UP (ref 96–108)
CO2 SERPL-SCNC: 21 MMOL/L — LOW (ref 22–31)
CREAT SERPL-MCNC: 1.13 MG/DL — SIGNIFICANT CHANGE UP (ref 0.5–1.3)
EGFR: 72 ML/MIN/1.73M2 — SIGNIFICANT CHANGE UP
EOSINOPHIL # BLD AUTO: 0 K/UL — SIGNIFICANT CHANGE UP (ref 0–0.5)
EOSINOPHIL NFR BLD AUTO: 0 % — SIGNIFICANT CHANGE UP (ref 0–6)
GLUCOSE BLDC GLUCOMTR-MCNC: 205 MG/DL — HIGH (ref 70–99)
GLUCOSE BLDC GLUCOMTR-MCNC: 217 MG/DL — HIGH (ref 70–99)
GLUCOSE BLDC GLUCOMTR-MCNC: 266 MG/DL — HIGH (ref 70–99)
GLUCOSE SERPL-MCNC: 221 MG/DL — HIGH (ref 70–99)
HCT VFR BLD CALC: 24.5 % — LOW (ref 39–50)
HGB BLD-MCNC: 8.3 G/DL — LOW (ref 13–17)
IMM GRANULOCYTES NFR BLD AUTO: 1.3 % — HIGH (ref 0–0.9)
LYMPHOCYTES # BLD AUTO: 0.42 K/UL — LOW (ref 1–3.3)
LYMPHOCYTES # BLD AUTO: 5.4 % — LOW (ref 13–44)
MAGNESIUM SERPL-MCNC: 2.3 MG/DL — SIGNIFICANT CHANGE UP (ref 1.6–2.6)
MCHC RBC-ENTMCNC: 33.9 GM/DL — SIGNIFICANT CHANGE UP (ref 32–36)
MCHC RBC-ENTMCNC: 35.2 PG — HIGH (ref 27–34)
MCV RBC AUTO: 103.8 FL — HIGH (ref 80–100)
MONOCYTES # BLD AUTO: 0.37 K/UL — SIGNIFICANT CHANGE UP (ref 0–0.9)
MONOCYTES NFR BLD AUTO: 4.8 % — SIGNIFICANT CHANGE UP (ref 2–14)
NEUTROPHILS # BLD AUTO: 6.85 K/UL — SIGNIFICANT CHANGE UP (ref 1.8–7.4)
NEUTROPHILS NFR BLD AUTO: 88.5 % — HIGH (ref 43–77)
NRBC # BLD: 0 /100 WBCS — SIGNIFICANT CHANGE UP (ref 0–0)
PHOSPHATE SERPL-MCNC: 2.7 MG/DL — SIGNIFICANT CHANGE UP (ref 2.5–4.5)
PLATELET # BLD AUTO: 109 K/UL — LOW (ref 150–400)
POTASSIUM SERPL-MCNC: 4.7 MMOL/L — SIGNIFICANT CHANGE UP (ref 3.5–5.3)
POTASSIUM SERPL-SCNC: 4.7 MMOL/L — SIGNIFICANT CHANGE UP (ref 3.5–5.3)
PROT SERPL-MCNC: 5.8 G/DL — LOW (ref 6–8.3)
RBC # BLD: 2.36 M/UL — LOW (ref 4.2–5.8)
RBC # FLD: 13.4 % — SIGNIFICANT CHANGE UP (ref 10.3–14.5)
SODIUM SERPL-SCNC: 131 MMOL/L — LOW (ref 135–145)
TACROLIMUS SERPL-MCNC: 10 NG/ML — SIGNIFICANT CHANGE UP
WBC # BLD: 7.74 K/UL — SIGNIFICANT CHANGE UP (ref 3.8–10.5)
WBC # FLD AUTO: 7.74 K/UL — SIGNIFICANT CHANGE UP (ref 3.8–10.5)

## 2024-03-29 PROCEDURE — 97161 PT EVAL LOW COMPLEX 20 MIN: CPT

## 2024-03-29 PROCEDURE — 82330 ASSAY OF CALCIUM: CPT

## 2024-03-29 PROCEDURE — 36415 COLL VENOUS BLD VENIPUNCTURE: CPT

## 2024-03-29 PROCEDURE — 97116 GAIT TRAINING THERAPY: CPT

## 2024-03-29 PROCEDURE — 83605 ASSAY OF LACTIC ACID: CPT

## 2024-03-29 PROCEDURE — 97110 THERAPEUTIC EXERCISES: CPT

## 2024-03-29 PROCEDURE — 99232 SBSQ HOSP IP/OBS MODERATE 35: CPT

## 2024-03-29 PROCEDURE — 85014 HEMATOCRIT: CPT

## 2024-03-29 PROCEDURE — 93005 ELECTROCARDIOGRAM TRACING: CPT

## 2024-03-29 PROCEDURE — 85610 PROTHROMBIN TIME: CPT

## 2024-03-29 PROCEDURE — 83735 ASSAY OF MAGNESIUM: CPT

## 2024-03-29 PROCEDURE — 82435 ASSAY OF BLOOD CHLORIDE: CPT

## 2024-03-29 PROCEDURE — C2617: CPT

## 2024-03-29 PROCEDURE — 85025 COMPLETE CBC W/AUTO DIFF WBC: CPT

## 2024-03-29 PROCEDURE — 84100 ASSAY OF PHOSPHORUS: CPT

## 2024-03-29 PROCEDURE — 85730 THROMBOPLASTIN TIME PARTIAL: CPT

## 2024-03-29 PROCEDURE — 87389 HIV-1 AG W/HIV-1&-2 AB AG IA: CPT

## 2024-03-29 PROCEDURE — 82803 BLOOD GASES ANY COMBINATION: CPT

## 2024-03-29 PROCEDURE — 86704 HEP B CORE ANTIBODY TOTAL: CPT

## 2024-03-29 PROCEDURE — 87340 HEPATITIS B SURFACE AG IA: CPT

## 2024-03-29 PROCEDURE — 87522 HEPATITIS C REVRS TRNSCRPJ: CPT

## 2024-03-29 PROCEDURE — 82947 ASSAY GLUCOSE BLOOD QUANT: CPT

## 2024-03-29 PROCEDURE — 71045 X-RAY EXAM CHEST 1 VIEW: CPT

## 2024-03-29 PROCEDURE — 86850 RBC ANTIBODY SCREEN: CPT

## 2024-03-29 PROCEDURE — 80197 ASSAY OF TACROLIMUS: CPT

## 2024-03-29 PROCEDURE — 83036 HEMOGLOBIN GLYCOSYLATED A1C: CPT

## 2024-03-29 PROCEDURE — 85018 HEMOGLOBIN: CPT

## 2024-03-29 PROCEDURE — 80053 COMPREHEN METABOLIC PANEL: CPT

## 2024-03-29 PROCEDURE — 86901 BLOOD TYPING SEROLOGIC RH(D): CPT

## 2024-03-29 PROCEDURE — C1889: CPT

## 2024-03-29 PROCEDURE — 97530 THERAPEUTIC ACTIVITIES: CPT

## 2024-03-29 PROCEDURE — C9399: CPT

## 2024-03-29 PROCEDURE — 82962 GLUCOSE BLOOD TEST: CPT

## 2024-03-29 PROCEDURE — 87635 SARS-COV-2 COVID-19 AMP PRB: CPT

## 2024-03-29 PROCEDURE — 84295 ASSAY OF SERUM SODIUM: CPT

## 2024-03-29 PROCEDURE — 84132 ASSAY OF SERUM POTASSIUM: CPT

## 2024-03-29 PROCEDURE — 86706 HEP B SURFACE ANTIBODY: CPT

## 2024-03-29 PROCEDURE — 86900 BLOOD TYPING SEROLOGIC ABO: CPT

## 2024-03-29 PROCEDURE — 76776 US EXAM K TRANSPL W/DOPPLER: CPT

## 2024-03-29 PROCEDURE — 86803 HEPATITIS C AB TEST: CPT

## 2024-03-29 RX ORDER — FLUTICASONE FUROATE, UMECLIDINIUM BROMIDE AND VILANTEROL TRIFENATATE 200; 62.5; 25 UG/1; UG/1; UG/1
1 POWDER RESPIRATORY (INHALATION)
Refills: 0 | DISCHARGE

## 2024-03-29 RX ORDER — REPAGLINIDE 1 MG/1
1 TABLET ORAL
Qty: 0 | Refills: 0 | DISCHARGE
Start: 2024-03-29

## 2024-03-29 RX ORDER — NYSTATIN 500MM UNIT
5 POWDER (EA) MISCELLANEOUS
Qty: 0 | Refills: 0 | DISCHARGE
Start: 2024-03-29

## 2024-03-29 RX ORDER — TACROLIMUS 5 MG/1
5 CAPSULE ORAL
Qty: 0 | Refills: 0 | DISCHARGE
Start: 2024-03-29

## 2024-03-29 RX ORDER — PYRIDOXINE HCL (VITAMIN B6) 100 MG
1 TABLET ORAL
Qty: 0 | Refills: 0 | DISCHARGE
Start: 2024-03-29

## 2024-03-29 RX ORDER — FAMOTIDINE 10 MG/ML
1 INJECTION INTRAVENOUS
Qty: 0 | Refills: 0 | DISCHARGE
Start: 2024-03-29

## 2024-03-29 RX ORDER — METOPROLOL TARTRATE 50 MG
1 TABLET ORAL
Refills: 0 | DISCHARGE

## 2024-03-29 RX ORDER — ACYCLOVIR SODIUM 500 MG
1 VIAL (EA) INTRAVENOUS
Qty: 0 | Refills: 0 | DISCHARGE
Start: 2024-03-29

## 2024-03-29 RX ORDER — METOPROLOL TARTRATE 50 MG
1 TABLET ORAL
Qty: 60 | Refills: 0
Start: 2024-03-29 | End: 2024-04-27

## 2024-03-29 RX ORDER — HEXAVITAMINS
1 TABLET ORAL
Refills: 0
Start: 2024-03-29

## 2024-03-29 RX ORDER — FOLIC ACID 0.8 MG
1 TABLET ORAL
Qty: 0 | Refills: 0 | DISCHARGE

## 2024-03-29 RX ORDER — MYCOPHENOLATE MOFETIL 250 MG/1
1000 CAPSULE ORAL
Qty: 0 | Refills: 0 | DISCHARGE
Start: 2024-03-29

## 2024-03-29 RX ORDER — SENNA PLUS 8.6 MG/1
2 TABLET ORAL
Qty: 0 | Refills: 0 | DISCHARGE
Start: 2024-03-29

## 2024-03-29 RX ORDER — SEVELAMER CARBONATE 2400 MG/1
1 POWDER, FOR SUSPENSION ORAL
Refills: 0 | DISCHARGE

## 2024-03-29 RX ADMIN — MYCOPHENOLATE MOFETIL 1 GRAM(S): 250 CAPSULE ORAL at 08:28

## 2024-03-29 RX ADMIN — TACROLIMUS 5 MILLIGRAM(S): 5 CAPSULE ORAL at 08:28

## 2024-03-29 RX ADMIN — Medication 4: at 08:29

## 2024-03-29 RX ADMIN — Medication 1 TABLET(S): at 13:40

## 2024-03-29 RX ADMIN — Medication 300 MILLIGRAM(S): at 13:39

## 2024-03-29 RX ADMIN — TRAMADOL HYDROCHLORIDE 50 MILLIGRAM(S): 50 TABLET ORAL at 07:06

## 2024-03-29 RX ADMIN — Medication 50 MILLIGRAM(S): at 13:39

## 2024-03-29 RX ADMIN — Medication 500000 UNIT(S): at 05:52

## 2024-03-29 RX ADMIN — CHLORHEXIDINE GLUCONATE 1 APPLICATION(S): 213 SOLUTION TOPICAL at 13:40

## 2024-03-29 RX ADMIN — REPAGLINIDE 1 MILLIGRAM(S): 1 TABLET ORAL at 13:40

## 2024-03-29 RX ADMIN — TRAMADOL HYDROCHLORIDE 50 MILLIGRAM(S): 50 TABLET ORAL at 06:37

## 2024-03-29 RX ADMIN — FAMOTIDINE 20 MILLIGRAM(S): 10 INJECTION INTRAVENOUS at 13:40

## 2024-03-29 RX ADMIN — Medication 400 MILLIGRAM(S): at 05:52

## 2024-03-29 RX ADMIN — Medication 20 MILLIGRAM(S): at 05:52

## 2024-03-29 RX ADMIN — TRAMADOL HYDROCHLORIDE 25 MILLIGRAM(S): 50 TABLET ORAL at 13:37

## 2024-03-29 RX ADMIN — REPAGLINIDE 1 MILLIGRAM(S): 1 TABLET ORAL at 05:51

## 2024-03-29 RX ADMIN — BASILIXIMAB 100 MILLIGRAM(S): 20 INJECTION, POWDER, FOR SOLUTION INTRAVENOUS at 09:22

## 2024-03-29 RX ADMIN — Medication 12.5 MILLIGRAM(S): at 05:52

## 2024-03-29 RX ADMIN — Medication 4: at 13:45

## 2024-03-29 RX ADMIN — Medication 500000 UNIT(S): at 13:38

## 2024-03-29 NOTE — PROGRESS NOTE ADULT - ASSESSMENT
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   CMV D-/R-, EBV D+/R+,  HLA mismatch 2, 2, 2. No DSA.     Pt had asymptomatic COVID +ve infection on pre op testing, repeat ngative after admission, remdesevir d/sal   Donor has latent TB, recipient started on INH/B6     S/p LURD transplant 3/25/24   Good graft function, Post op US reviewed unremarkable. Good UOP, Cr 1.1 today .   D/c jeremy d/c MARKY drain     Immunosuppression   Simulect induction  Envarsus 5mg yesterday level today  10 at goal. Aim for trough 8-10   Continue MMF 1 gram po bid  Prednisone taper      Infection prophylaxis   Nystatin, bactrim ss daily, acyclovir for 3 months (CMV low risk)  INH/B6 for donor with latent TB     Anemia - multifactorial - CKD, surgery, medications. Hct relatively stable     Diabetes II- continue ADMELOG sliding scale d/c home on Prandin 1mg po TID with meals   HTN and h/o arrythmia -continue metoprolol 12.5mg po bid   Hypothyroidism - continue synthroid.    D/c home today , f/u in clinic on Monday

## 2024-03-29 NOTE — DISCHARGE NOTE NURSING/CASE MANAGEMENT/SOCIAL WORK - PATIENT PORTAL LINK FT
You can access the FollowMyHealth Patient Portal offered by [facility] by registering at the following website: [email or web address]. By joining [facility]’s FollowMyHealth portal, you will also be able to view your health information using other applications (apps) compatible with our system.

## 2024-03-29 NOTE — PROGRESS NOTE ADULT - SUBJECTIVE AND OBJECTIVE BOX
Transplant Surgery - Multidisciplinary Rounds  --------------------------------------------------------------  LDRT  Date: 03/25/2024  POD#4    Present: Present: Patient seen and examined with multidisciplinary team including transplant surgeon Dr. Borrego, transplant nephrologist Dr. Lozano & fellow Dr. Coley, pharmacist Thad Muñoz, RANDY Isbell, and unit RN during AM rounds. Disciplines not in attendance will be notified of the plan.     Interval Events:  - Afebrile, VSS  - good urine output  - cr trending down      Immunosuppression  Induction: Simulect  Maintenance immunosuppression: Envarsus by level, MMF 1 g BID, steroid taper, POD#4 Simulect today  Ongoing monitoring for signs of rejection.    Potential discharge date: pending clinical course      MEDICATIONS  (STANDING):  acyclovir   Oral Tab/Cap 400 milliGRAM(s) Oral two times a day  albuterol    90 MICROgram(s) HFA Inhaler 2 Puff(s) Inhalation every 6 hours  chlorhexidine 2% Cloths 1 Application(s) Topical daily  dextrose 5%. 1000 milliLiter(s) (50 mL/Hr) IV Continuous <Continuous>  dextrose 5%. 1000 milliLiter(s) (100 mL/Hr) IV Continuous <Continuous>  dextrose 50% Injectable 12.5 Gram(s) IV Push once  dextrose 50% Injectable 25 Gram(s) IV Push once  dextrose 50% Injectable 25 Gram(s) IV Push once  famotidine    Tablet 20 milliGRAM(s) Oral daily  glucagon  Injectable 1 milliGRAM(s) IntraMuscular once  influenza  Vaccine (HIGH DOSE) 0.7 milliLiter(s) IntraMuscular once  insulin lispro (ADMELOG) corrective regimen sliding scale   SubCutaneous at bedtime  insulin lispro (ADMELOG) corrective regimen sliding scale   SubCutaneous three times a day before meals  isoniazid 300 milliGRAM(s) Oral daily  levothyroxine 125 MICROGram(s) Oral daily  metoprolol tartrate 12.5 milliGRAM(s) Oral two times a day  mycophenolate mofetil 1 Gram(s) Oral every 12 hours  nystatin    Suspension [id number] Unit(s) Swish and Swallow four times a day  polyethylene glycol 3350 17 Gram(s) Oral daily  predniSONE   Tablet 20 milliGRAM(s) Oral two times a day  predniSONE   Tablet   Oral   pyridoxine 50 milliGRAM(s) Oral daily  repaglinide 1 milliGRAM(s) Oral three times a day before meals  senna 2 Tablet(s) Oral at bedtime  tacrolimus ER Tablet (ENVARSUS XR) 5 milliGRAM(s) Oral <User Schedule>  trimethoprim   80 mG/sulfamethoxazole 400 mG 1 Tablet(s) Oral daily    MEDICATIONS  (PRN):  acetaminophen     Tablet .. 650 milliGRAM(s) Oral every 6 hours PRN Mild Pain (1 - 3)  dextrose Oral Gel 15 Gram(s) Oral once PRN Blood Glucose LESS THAN 70 milliGRAM(s)/deciliter  ondansetron Injectable 4 milliGRAM(s) IV Push every 6 hours PRN Nausea and/or Vomiting  traMADol 25 milliGRAM(s) Oral every 6 hours PRN Moderate Pain (4 - 6)  traMADol 50 milliGRAM(s) Oral every 8 hours PRN Severe Pain (7 - 10)      PAST MEDICAL & SURGICAL HISTORY:  Hard of hearing  Bilateral hearing Aids      Type 2 diabetes mellitus      HTN (hypertension)      Hypothyroidism      Osteomyelitis      ESRD on dialysis      S/P partial thyroidectomy  '95  benign      H/O laparoscopic adjustable gastric banding  ' 2010      S/P cataract extraction  '08 and ' 2011   Bilateral      History of foreign body aspiration  7/2018  from Left Ear      History of amputation of toe          Vital Signs Last 24 Hrs  T(C): 36.6 (29 Mar 2024 08:49), Max: 36.9 (28 Mar 2024 14:54)  T(F): 97.8 (29 Mar 2024 08:49), Max: 98.4 (28 Mar 2024 14:54)  HR: 86 (29 Mar 2024 08:49) (64 - 86)  BP: 164/84 (29 Mar 2024 08:49) (134/77 - 164/84)  BP(mean): --  RR: 20 (29 Mar 2024 08:49) (18 - 20)  SpO2: 96% (29 Mar 2024 08:49) (96% - 100%)    Parameters below as of 29 Mar 2024 08:49  Patient On (Oxygen Delivery Method): room air        I&O's Summary    28 Mar 2024 07:01  -  29 Mar 2024 07:00  --------------------------------------------------------  IN: 2740 mL / OUT: 2382 mL / NET: 358 mL                              8.3    7.74  )-----------( 109      ( 29 Mar 2024 06:22 )             24.5     03-29    131<L>  |  100  |  29<H>  ----------------------------<  221<H>  4.7   |  21<L>  |  1.13    Ca    8.8      29 Mar 2024 06:22  Phos  2.7     03-29  Mg     2.3     03-29    TPro  5.8<L>  /  Alb  3.2<L>  /  TBili  0.3  /  DBili  x   /  AST  30  /  ALT  52<H>  /  AlkPhos  95  03-29    Tacrolimus (), Serum: 10.0 ng/mL (03-29 @ 06:22)      Review of systems  Gen: No weight changes, fatigue, fevers/chills, weakness  Skin: No rashes  Head/Eyes/Ears/Mouth: No headache; Normal hearing; Normal vision w/o blurriness; No sinus pain/discomfort, sore throat  Respiratory: No dyspnea, cough, wheezing, hemoptysis  CV: No chest pain, PND, orthopnea  GI: Mild abdominal pain at surgical incision site; denies diarrhea, constipation, nausea, vomiting, melena, hematochezia  : No increased frequency, dysuria, hematuria, nocturia  MSK: No joint pain/swelling; no back pain; no edema  Neuro: No dizziness/lightheadedness, weakness, seizures, numbness, tingling  Heme: No easy bruising or bleeding  Endo: No heat/cold intolerance  Psych: No significant nervousness, anxiety, stress, depression  All other systems were reviewed and are negative, except as noted.      PHYSICAL EXAM:  Constitutional: Well developed / well nourished  Eyes: Anicteric, PERRLA  ENMT: nc/at  Neck: supple, trachea midline  Respiratory: CTA B/L  Cardiovascular: RRR  Gastrointestinal: Soft, non distended, mild tenderness at the incision site; incision c/d/i, gerardo x1 ss  Genitourinary: Urinary catheter in place draining yellow urine  Extremities: SCD's in place and working bilaterally  Vascular: Palpable dp pulses bilaterally  Neurological: A&O x3  Skin: no rashes, ulcerations or lesions;  Musculoskeletal: Moving all extremities  Psychiatric: Responsive

## 2024-03-29 NOTE — CHART NOTE - NSCHARTNOTEFT_GEN_A_CORE
Nutrition Follow Up Note  Patient seen for: Nutrition Department protocol for post kidney transplant recipient follow up   Pt is s/p LDRT  Date: 2024  POD#4    Chart reviewed, events noted.    Source: [X] Patient       [x] Current Medical Record       [] RN        [] Family at bedside       [] Other:    -If unable to interview patient: [] Trach/Vent/BiPAP  [] Disoriented/confused/inappropriate to interview    Diet Order:   Diet, Consistent Carbohydrate Renal w/Evening Snack (24)    - Is current order appropriate/adequate? [X] Yes  []  No:     - PO intake :   [X] >75%  Adequate    [] 50-75%  Fair       [] <50%  Poor    - Nutrition-related concerns:      - Ordered for transplant meds: Deltasone, Tacrolimus, Cellcept       -  BG Management: Prandin and SSI ordered for coverage      - UOP: 200 ml thus far (3/29), 2,335 ml (3/28), 2,530 ml (3/27)      - GI:  Last BM 3/28/24, ordered for Bowel Regimen? [X] Yes- Miralax and Senna   [] No      - Pt provided with post-transplant education on initial assessment, able to teach back 1-2 points at time of RD visit       Weights: Drug Dosing Weight  Weight (kg): 91.898 (25 Mar 2024 08:24)  BMI (kg/m2): 29.5 (25 Mar 2024 08:24)  Daily Weight in k.5 (-), Weight in k.2 (-), Weight in k.2 (-), Weight in k.1 ()    Nutritionally Pertinent MEDICATIONS  (STANDING):  acyclovir   Oral Tab/Cap  dextrose 5%.  dextrose 5%.  dextrose 50% Injectable  dextrose 50% Injectable  dextrose 50% Injectable  famotidine    Tablet  glucagon  Injectable  insulin lispro (ADMELOG) corrective regimen sliding scale  insulin lispro (ADMELOG) corrective regimen sliding scale  isoniazid  levothyroxine  metoprolol tartrate  nystatin    Suspension  polyethylene glycol 3350  predniSONE   Tablet  predniSONE   Tablet  pyridoxine  repaglinide  senna  trimethoprim   80 mG/sulfamethoxazole 400 mG    Pertinent Labs:  @ 06:22: Na 131<L>, BUN 29<H>, Cr 1.13, <H>, K+ 4.7, Phos 2.7, Mg 2.3, Alk Phos 95, ALT/SGPT 52<H>, AST/SGOT 30, HbA1c --    A1C with Estimated Average Glucose Result: 5.2 % (24 @ 06:48)    Finger Sticks:  POCT Blood Glucose.: 205 mg/dL ( @ 08:19)  POCT Blood Glucose.: 259 mg/dL ( @ 21:24)  POCT Blood Glucose.: 134 mg/dL ( @ 17:28)  POCT Blood Glucose.: 211 mg/dL ( @ 12:40)      Skin per nursing documentation: -- No pressure injuries noted per flow sheets   Edema: -- +1 generalized edema per flow sheets    Estimated Needs:   [X] no change since previous assessment  [] recalculated:     Previous Nutrition Diagnosis:   1. Increased Nutrient Needs  2. Food and Nutrition related knowledge deficit   Nutrition Diagnosis is: [X] ongoing  [] resolved [] not applicable     New Nutrition Diagnosis: [X] Not applicable    Nutrition Care Plan:  [X] In Progress  [] Achieved  [] Not applicable    Nutrition Interventions:     Education Provided:       [X] Yes: Reviewed post-transplant nutrition therapy and food safety guidelines for transplant recipients.  Reviewed recommendations to avoid grapefruit, pomegranate and star fruit while taking immunosuppressant medication.  Reviewed recommendations for moderate intake of sodium with transplant medications.  Reviewed Consistent Carbohydrate diet, including carbohydrate content of foods and portion sizes. Pt is advised that BG management may be more challenging after transplant.  [] No:        Recommendations:         1. Continue diet as ordered/tolerated upon discharge: Consistent Carbohydrate Renal diet.      2. Recommend follow up visit with Transplant MD & outpatient RD as warranted      3. Provide encouragement with PO intakes, menu selections and assistance with meals as needed      4. Provided education on post-transplant nutrition therapy & food safety guidelines for transplant recipients with nutrition package before discharge- made aware RD remains available      Monitoring and Evaluation:   Continue to monitor patient's weights, labs, PO intakes, urine output, blood glucose levels, and bowel movements.     RD remains available upon request and will follow up per protocol  Lida Tripp, MS,RDN,CDN AVAILABLE ON TEAMS

## 2024-03-29 NOTE — DISCHARGE NOTE NURSING/CASE MANAGEMENT/SOCIAL WORK - NSPROMEDSBROUGHTTOHOSP_GEN_A_NUR
"Requested Prescriptions   Pending Prescriptions Disp Refills     sertraline (ZOLOFT) 50 MG tablet [Pharmacy Med Name: SERTRALINE HCL 50 MG TABLET] 30 tablet 0     Sig: TAKE 1 TABLET BY MOUTH EVERY DAY         Last Written Prescription Date:  12/17/19  Last Fill Quantity: 30,  # refills: 0   Last Office Visit with [facility], [facility] or [facility] prescribing provider:  10/07/19   Future Office Visit:         SSRIs Protocol Passed - 1/19/2020  1:22 AM        Passed - Recent (12 mo) or future (30 days) visit within the authorizing provider's specialty     Patient has had an office visit with the authorizing provider or a provider within the authorizing providers department within the previous 12 mos or has a future within next 30 days. See \"Patient Info\" tab in inbasket, or \"Choose Columns\" in Meds & Orders section of the refill encounter.              Passed - Medication is active on med list        Passed - Patient is age 18 or older              Garfield Faarax  [facility]  " no

## 2024-03-29 NOTE — DISCHARGE NOTE NURSING/CASE MANAGEMENT/SOCIAL WORK - NSDCPEFALRISK_GEN_ALL_CORE
For information on Fall & Injury Prevention, visit: [email or web address] OR  [email or web address] OR  https://www.cdc.gov/steadi/patient.html

## 2024-03-29 NOTE — PROGRESS NOTE ADULT - ASSESSMENT
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 Now s/p LDRT with Simulect induction 3/25/2024    Plan:  LDRT with Simulect 3/25/2024 POD #4  - Renal US 3/25 w/ patent vessels, mildly elevated velocities at arterial anastomosis, and no significant renal artery stenosis  - Strict I/O's DC Arenas and gerardo  - regular diet  - pain control  - bowel regimen  - SCD/IS/ PT consult  - Simulect today    Donor TB+  - INH/B6 treatment  - Transplant ID following    COVID+ on 3/23  - Repeat COVID PCR negative on 3/25  - Remdesivir dc'ed    DM2  - continue 1mg Prandin TID before meals   - monitor FS    Immunosuppression  - Simulect induction  - Envarsus by level, MMF 1g BID, prednisone taper  - PPX: Bactrim, nystatin, acyclovir 400 mg BID     Dispo: home PT

## 2024-03-29 NOTE — PROGRESS NOTE ADULT - SUBJECTIVE AND OBJECTIVE BOX
[facility] DIVISION OF KIDNEY DISEASES AND HYPERTENSION -- FOLLOW UP NOTE  --------------------------------------------------------------------------------  Authored by: Nubia Lozano   Cell # [phone]     RICHARD WILSON was seen and examined at bedside.   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 Pain well controlled. No NVD. No fever.       Standing Inpatient Medications  acyclovir   Oral Tab/Cap 400 milliGRAM(s) Oral two times a day  albuterol    90 MICROgram(s) HFA Inhaler 2 Puff(s) Inhalation every 6 hours  famotidine    Tablet 20 milliGRAM(s) Oral daily  glucagon  Injectable 1 milliGRAM(s) IntraMuscular once  influenza  Vaccine (HIGH DOSE) 0.7 milliLiter(s) IntraMuscular once  insulin lispro (ADMELOG) corrective regimen sliding scale   SubCutaneous at bedtime  insulin lispro (ADMELOG) corrective regimen sliding scale   SubCutaneous three times a day before meals  isoniazid 300 milliGRAM(s) Oral daily  levothyroxine 125 MICROGram(s) Oral daily  metoprolol tartrate 12.5 milliGRAM(s) Oral two times a day  mycophenolate mofetil 1 Gram(s) Oral every 12 hours  nystatin    Suspension 249354 Unit(s) Swish and Swallow four times a day  polyethylene glycol 3350 17 Gram(s) Oral daily  predniSONE   Tablet   Oral   predniSONE   Tablet 20 milliGRAM(s) Oral two times a day  pyridoxine 50 milliGRAM(s) Oral daily  repaglinide 1 milliGRAM(s) Oral three times a day before meals  senna 2 Tablet(s) Oral at bedtime  tacrolimus ER Tablet (ENVARSUS XR) 5 milliGRAM(s) Oral <User Schedule>  trimethoprim   80 mG/sulfamethoxazole 400 mG 1 Tablet(s) Oral daily    PRN Inpatient Medications  acetaminophen     Tablet .. 650 milliGRAM(s) Oral every 6 hours PRN  dextrose Oral Gel 15 Gram(s) Oral once PRN  ondansetron Injectable 4 milliGRAM(s) IV Push every 6 hours PRN  traMADol 50 milliGRAM(s) Oral every 8 hours PRN  traMADol 25 milliGRAM(s) Oral every 6 hours PRN        VITALS/PHYSICAL EXAM  --------------------------------------------------------------------------------  T(C): 36.6 (03-29-24 @ 08:49), Max: 36.9 (03-28-24 @ 14:54)  HR: 86 (03-29-24 @ 08:49) (64 - 86)  BP: 164/84 (03-29-24 @ 08:49) (134/77 - 164/84)  RR: 20 (03-29-24 @ 08:49) (18 - 20)  SpO2: 96% (03-29-24 @ 08:49) (96% - 100%)        03-28-24 @ 07:01  -  03-29-24 @ 07:00  --------------------------------------------------------  IN: 2740 mL / OUT: 2382 mL / NET: 358 mL      Physical Exam:  Awake and alert  Resting comfortably in bed  Lungs clear   Regular HR  Abdomen soft, RLQ clean and dry incision, MARKY serosanguinous output   Arenas clear urine   No peripheral edema   LUE AV fistula + thrill   Alert and non focal     LABS/STUDIES  --------------------------------------------------------------------------------              8.3    7.74  >-----------<  109      [03-29-24 @ 06:22]              24.5     131  |  100  |  29  ----------------------------<  221      [03-29-24 @ 06:22]  4.7   |  21  |  1.13        Ca     8.8     [03-29-24 @ 06:22]      Mg     2.3     [03-29-24 @ 06:22]      Phos  2.7     [03-29-24 @ 06:22]    TPro  5.8  /  Alb  3.2  /  TBili  0.3  /  DBili  x   /  AST  30  /  ALT  52  /  AlkPhos  95  [03-29-24 @ 06:22]        Creatinine Trend:  SCr 1.13 [03-29 @ 06:22]  SCr 1.24 [03-28 @ 07:17]  SCr 2.09 [03-27 @ 06:53]  SCr 3.82 [03-26 @ 09:00]  SCr 3.41 [03-26 @ 06:48]    Tacrolimus (), Serum: 10.0 ng/mL (03-29 @ 06:22)  Tacrolimus (), Serum: 12.7 ng/mL (03-28 @ 07:16)  Tacrolimus (), Serum: 11.7 ng/mL (03-27 @ 06:53)        CAPILLARY BLOOD GLUCOSE  POCT Blood Glucose.: 205 mg/dL (29 Mar 2024 08:19)  POCT Blood Glucose.: 259 mg/dL (28 Mar 2024 21:24)  POCT Blood Glucose.: 134 mg/dL (28 Mar 2024 17:28)  POCT Blood Glucose.: 211 mg/dL (28 Mar 2024 12:40)

## 2024-03-29 NOTE — PHARMACY EDUCATION NOTE - EDUCATION SUMMARY
Met with patient and his wife, Bruce, at bedside for post-transplant medication education. Counseling on indications, side-effects, and stent was provided. Patient was fully engaged throughout the session. Mycophenolate REMs program was discussed. All questions were clarified. Patient was able to answer all questions related to the regimen. Will continue to follow as needed.    Thad Muñoz, PharmD

## 2024-04-03 ENCOUNTER — APPOINTMENT (OUTPATIENT)
Dept: NEPHROLOGY | Facility: CLINIC | Age: 69
End: 2024-04-03
Payer: MEDICARE

## 2024-04-03 VITALS
OXYGEN SATURATION: 100 % | BODY MASS INDEX: 28.17 KG/M2 | WEIGHT: 208 LBS | DIASTOLIC BLOOD PRESSURE: 93 MMHG | TEMPERATURE: 98.1 F | SYSTOLIC BLOOD PRESSURE: 194 MMHG | RESPIRATION RATE: 14 BRPM | HEART RATE: 97 BPM | HEIGHT: 72 IN

## 2024-04-03 LAB
ALBUMIN SERPL ELPH-MCNC: 3.9 G/DL
ALP BLD-CCNC: 147 U/L
ALT SERPL-CCNC: 39 U/L
ANION GAP SERPL CALC-SCNC: 12 MMOL/L
AST SERPL-CCNC: 24 U/L
BASOPHILS # BLD AUTO: 0.03 K/UL
BASOPHILS NFR BLD AUTO: 0.4 %
BILIRUB SERPL-MCNC: 0.5 MG/DL
BUN SERPL-MCNC: 9 MG/DL
CALCIUM SERPL-MCNC: 9 MG/DL
CHLORIDE SERPL-SCNC: 100 MMOL/L
CO2 SERPL-SCNC: 23 MMOL/L
CREAT SERPL-MCNC: 1.21 MG/DL
CREAT SPEC-SCNC: 83 MG/DL
CREAT/PROT UR: 0.6 RATIO
EGFR: 66 ML/MIN/1.73M2
EOSINOPHIL # BLD AUTO: 0.2 K/UL
EOSINOPHIL NFR BLD AUTO: 2.4 %
GLUCOSE SERPL-MCNC: 102 MG/DL
HCT VFR BLD CALC: 29.2 %
HGB BLD-MCNC: 10 G/DL
IMM GRANULOCYTES NFR BLD AUTO: 2.9 %
LYMPHOCYTES # BLD AUTO: 0.8 K/UL
LYMPHOCYTES NFR BLD AUTO: 9.8 %
MAGNESIUM SERPL-MCNC: 1.9 MG/DL
MAN DIFF?: NORMAL
MCHC RBC-ENTMCNC: 34.2 GM/DL
MCHC RBC-ENTMCNC: 34.6 PG
MCV RBC AUTO: 101 FL
MONOCYTES # BLD AUTO: 0.65 K/UL
MONOCYTES NFR BLD AUTO: 7.9 %
NEUTROPHILS # BLD AUTO: 6.26 K/UL
NEUTROPHILS NFR BLD AUTO: 76.6 %
PHOSPHATE SERPL-MCNC: 1.9 MG/DL
PLATELET # BLD AUTO: 185 K/UL
POTASSIUM SERPL-SCNC: 4.7 MMOL/L
PROT SERPL-MCNC: 6.3 G/DL
PROT UR-MCNC: 47 MG/DL
RBC # BLD: 2.89 M/UL
RBC # FLD: 14.2 %
SODIUM SERPL-SCNC: 135 MMOL/L
TACROLIMUS SERPL-MCNC: 7.8 NG/ML
WBC # FLD AUTO: 8.18 K/UL

## 2024-04-03 PROCEDURE — 99215 OFFICE O/P EST HI 40 MIN: CPT

## 2024-04-03 RX ORDER — CINNAMON BARK 500 MG
CAPSULE ORAL DAILY
Refills: 0 | Status: ACTIVE | COMMUNITY

## 2024-04-03 RX ORDER — SEVELAMER CARBONATE 800 MG/1
800 TABLET, FILM COATED ORAL
Qty: 90 | Refills: 0 | Status: DISCONTINUED | COMMUNITY
Start: 2022-10-19 | End: 2024-04-03

## 2024-04-03 RX ORDER — MIDODRINE HYDROCHLORIDE 5 MG/1
5 TABLET ORAL
Refills: 0 | Status: DISCONTINUED | COMMUNITY
End: 2024-04-03

## 2024-04-03 RX ORDER — COVID-19 MOLECULAR TEST ASSAY
KIT MISCELLANEOUS
Qty: 8 | Refills: 0 | Status: DISCONTINUED | COMMUNITY
Start: 2022-06-21 | End: 2024-04-03

## 2024-04-03 RX ORDER — PREDNISONE 5 MG/1
5 TABLET ORAL
Qty: 42 | Refills: 0 | Status: DISCONTINUED | COMMUNITY
Start: 2024-03-27 | End: 2024-04-03

## 2024-04-03 NOTE — HISTORY OF PRESENT ILLNESS
[FreeTextEntry1] : 68-year-old man with ESRD on HD since 6/2022, received LURD kidney transplant from a 32-year-old altruistic donor on 3/25/24  PMH: CKD and proteinuria related to obesity (?FSGS) dx in 1985, Biopsy in 1989,Obesity lap band in 2010 with 150lbs weight loss, deflated since 2019, DM dx 1992 never on insulin, HLD, Hypothyroidism, Portal HTN and liver fibrosis likely duet o metabolic d/o associated steatotic liver disease, PVD s/p left toe amputation 2019, KRYSTIAN improved after weight loss, h/o pericardial effusion s/p pericardiocentesis 11/2022, left forearm AV fistula. Strong family history of kidney disease, Mom had CKD, Sister had a kidney transplant.   CMV D-/R-, EBV D+/R+, HLA mismatch 2, 2, 2. No DSA. Pt had asymptomatic COVID +ve infection on pre op testing, repeat negative after admission. Donor has latent TB, recipient started on INH/B6 Did well post op. He was discharged on post op day 4 with a serum creatinine of 1.1mg/dL.   Presents today for his first outpatient visit.  Voiding urine without difficulty. No dysuria, no hematuria, nocturia several times, stream is good.  Has incisional pain, Tylenol is not helping. Wants a prescription for Tramadol.  No other abdominal pain, nausea or vomiting. Takes Colace every night, has loose stools. Appetite good.  No fever, chills. Energy gets better every day.  No chest pain or shortness of breath, no leg swelling.  Taking all medications as prescribed   in AM.  Blood glucose  fasting, mostly < 200 random  Lives with wife. Retired in 2021 Weight 210 lbs, up 8 lbs from pre transplant weight. Feels it is water weight, he has bilateral leg swelling that is improving.

## 2024-04-03 NOTE — PHYSICAL EXAM
[General Appearance - Alert] : alert [General Appearance - Well-Appearing] : healthy appearing [General Appearance - In No Acute Distress] : in no acute distress [Sclera] : the sclera and conjunctiva were normal [Oropharynx] : the oropharynx was normal [Neck Appearance] : the appearance of the neck was normal [Respiration, Rhythm And Depth] : normal respiratory rhythm and effort [Exaggerated Use Of Accessory Muscles For Inspiration] : no accessory muscle use [Auscultation Breath Sounds / Voice Sounds] : lungs were clear to auscultation bilaterally [Heart Rate And Rhythm] : heart rate was normal and rhythm regular [Heart Sounds] : normal S1 and S2 [Heart Sounds Gallop] : no gallops [Bowel Sounds] : normal bowel sounds [Abdomen Soft] : soft [Abdomen Tenderness] : non-tender [] : no rash [No Focal Deficits] : no focal deficits [Oriented To Time, Place, And Person] : oriented to person, place, and time [FreeTextEntry1] : RLQ incision clean, dry and intact . No tenderness. Soft.

## 2024-04-03 NOTE — ASSESSMENT
[FreeTextEntry1] : 68-year-old man with ESRD on HD since 6/2022, received LURD kidney transplant from a 32-year-old altruistic donor on 3/25/24 PMH: CKD and proteinuria related to obesity (?FSGS) dx in 1985, Biopsy in 1989, Obesity lap band in 2010 with 150lbs weight loss, deflated since 2019, DM dx 1992 never on insulin, HLD, Hypothyroidism, Portal HTN and liver fibrosis likely due to metabolic d/o associated steatotic liver disease, PVD s/p left toe amputation 2019, KRYSTIAN improved after weight loss, h/o pericardial effusion s/p pericardiocentesis 11/2022, left forearm AV fistula.  S/p LURD transplant 3/25/24 Cr 1.21mg/dL, UPCR 0.6 down from 5.6 grams pre transplant.   Immunosuppression Simulect induction Envarsus 5mg daily. Level 7.8. Increase to 6mg daily. Aim for trough 8-10 Continue MMF 1-gram po bid Prednisone 5mg daily   Infection prophylaxis Nystatin swish and swallow qid  Bactrim SS daily  Acyclovir 400mg po bid for 3 months (CMV low risk) INH/B6 for donor with latent TB  Anemia - multifactorial - CKD, surgery, medications. Hct stable  Diabetes II- controlled. Continue Prandin 1mg po TID with meals  HTN and h/o arrythmia  - previously on metoprolol low dose and midodrine for intradialytic hypotension  BP elevated - Increase Metoprolol 25mg po bid  Hypothyroidism - continue Synthroid 125mcg po daily   Hypophosphatemia - increase dietary phos   F/u next week

## 2024-04-04 LAB
APPEARANCE: CLEAR
BACTERIA: NEGATIVE /HPF
BILIRUBIN URINE: NEGATIVE
BLOOD URINE: ABNORMAL
CAST: 0 /LPF
COLOR: YELLOW
EPITHELIAL CELLS: 1 /HPF
GLUCOSE QUALITATIVE U: NEGATIVE MG/DL
KETONES URINE: NEGATIVE MG/DL
LEUKOCYTE ESTERASE URINE: ABNORMAL
MICROSCOPIC-UA: NORMAL
NITRITE URINE: NEGATIVE
PH URINE: 6
PROTEIN URINE: 30 MG/DL
RED BLOOD CELLS URINE: 7 /HPF
SPECIFIC GRAVITY URINE: 1.01
UROBILINOGEN URINE: 0.2 MG/DL
WHITE BLOOD CELLS URINE: 5 /HPF

## 2024-04-10 ENCOUNTER — APPOINTMENT (OUTPATIENT)
Dept: TRANSPLANT | Facility: CLINIC | Age: 69
End: 2024-04-10
Payer: MEDICARE

## 2024-04-10 ENCOUNTER — APPOINTMENT (OUTPATIENT)
Dept: NEPHROLOGY | Facility: CLINIC | Age: 69
End: 2024-04-10

## 2024-04-10 VITALS
DIASTOLIC BLOOD PRESSURE: 89 MMHG | WEIGHT: 209 LBS | SYSTOLIC BLOOD PRESSURE: 163 MMHG | HEART RATE: 70 BPM | BODY MASS INDEX: 28.31 KG/M2 | RESPIRATION RATE: 100 BRPM | TEMPERATURE: 98 F | HEIGHT: 72 IN

## 2024-04-10 PROCEDURE — 99215 OFFICE O/P EST HI 40 MIN: CPT | Mod: 24

## 2024-04-10 RX ORDER — METOPROLOL TARTRATE 25 MG/1
25 TABLET, FILM COATED ORAL TWICE DAILY
Qty: 60 | Refills: 3 | Status: ACTIVE | COMMUNITY

## 2024-04-10 NOTE — HISTORY OF PRESENT ILLNESS
[Living Donor] : Living donor [Basiliximab] : basiliximab [Steroids] : steroids [Negative/Negative] : Donor Negative/Recipient Negative [] : Yes [FreeTextEntry4] : s/p LDRT from 31yo altruistic donor (Renewal) Pt had asymptomatic COVID +ve infection on pre op testing, repeat negative after admission. Donor has latent TB, recipient started on INH/B6 Did well post op. He was discharged on post op day 4 with a serum creatinine of 1.1mg/dL.  [de-identified] : Patient reports continued improvement since the surgery. Denies any fevers, abdominal pain, nausea, diarrhea, hematuria or dysuria.   Glucose controlled during the day, elevated at night 200-300. Currently taking Knuetpf2ky tid BP controlled Last Cr 1.2, taking Envarsus 6mg

## 2024-04-10 NOTE — PHYSICAL EXAM
[No Acute Distress] : no acute distress [Sclera Anicteric] : sclera anicteric [Breathing Comfortably on RA] : breathing comfortably on room air [Soft] : soft [Non-tender] : non-tender [] : right dorsalis pedis palpable [Normal] : normal [FreeTextEntry1] : No oral thrush [TextBox_34] : 1+ edema b/l LE [TextBox_86] : No inguinal lymphadenopathy  [Site: ___] : Site: [unfilled] [Clean] : clean [Dry] : dry [Healing Well] : healing well [Bleeding] : no active bleeding [Foul Odor] : no foul smell [Purulent Drainage] : no purulent drainage [Serosanguinous Drainage] : no serosanguinous drainage [Erythema] : not erythematous [Warm] : not warm [Tender] : not tender

## 2024-04-10 NOTE — PLAN
[FreeTextEntry1] : Immunosuppression - Cont Envarsus 6mg daily, Prednisone 5mg daily, Cellcept 1gm bid Will check tacrolimus level and adjust dose for goal ~8 Prophylaxis with nystatin, acyclovir, and bactrim Cont INH/B6 for donor latent TB; f/u with transplant ID HTN controlled with current regimen of Metoprolol 25mg bid DM - controlled during daytime, elevated at night. Educated about decreasing carbohydrate intake. Cont prandin 1mg in morning and afternoon; Increase to 2mg at night.   Will be scheduled for cystoscopy and removal of ureteral stent in next few weeks.   Patient to follow-up with nephrology next week, then continue with weekly appointments.

## 2024-04-11 ENCOUNTER — NON-APPOINTMENT (OUTPATIENT)
Age: 69
End: 2024-04-11

## 2024-04-11 LAB
ALBUMIN SERPL ELPH-MCNC: 4.1 G/DL
ALP BLD-CCNC: 152 U/L
ALT SERPL-CCNC: 22 U/L
ANION GAP SERPL CALC-SCNC: 11 MMOL/L
APPEARANCE: CLEAR
AST SERPL-CCNC: 19 U/L
BACTERIA: NEGATIVE /HPF
BASOPHILS # BLD AUTO: 0.05 K/UL
BASOPHILS NFR BLD AUTO: 0.9 %
BILIRUB SERPL-MCNC: 0.4 MG/DL
BILIRUBIN URINE: NEGATIVE
BLOOD URINE: ABNORMAL
BUN SERPL-MCNC: 8 MG/DL
CALCIUM SERPL-MCNC: 9.6 MG/DL
CAST: 2 /LPF
CHLORIDE SERPL-SCNC: 100 MMOL/L
CO2 SERPL-SCNC: 22 MMOL/L
COLOR: YELLOW
CREAT SERPL-MCNC: 1.11 MG/DL
CREAT SPEC-SCNC: 74 MG/DL
CREAT/PROT UR: 0.3 RATIO
EGFR: 73 ML/MIN/1.73M2
EOSINOPHIL # BLD AUTO: 0.06 K/UL
EOSINOPHIL NFR BLD AUTO: 1 %
EPITHELIAL CELLS: 1 /HPF
GLUCOSE QUALITATIVE U: NEGATIVE MG/DL
GLUCOSE SERPL-MCNC: 94 MG/DL
HCT VFR BLD CALC: 26.6 %
HGB BLD-MCNC: 8.6 G/DL
HYALINE CASTS: PRESENT
IMM GRANULOCYTES NFR BLD AUTO: 0.5 %
KETONES URINE: NEGATIVE MG/DL
LEUKOCYTE ESTERASE URINE: ABNORMAL
LYMPHOCYTES # BLD AUTO: 0.87 K/UL
LYMPHOCYTES NFR BLD AUTO: 15.1 %
MAGNESIUM SERPL-MCNC: 1.8 MG/DL
MAN DIFF?: NORMAL
MCHC RBC-ENTMCNC: 32.3 GM/DL
MCHC RBC-ENTMCNC: 33.9 PG
MCV RBC AUTO: 104.7 FL
MICROSCOPIC-UA: NORMAL
MONOCYTES # BLD AUTO: 0.39 K/UL
MONOCYTES NFR BLD AUTO: 6.7 %
NEUTROPHILS # BLD AUTO: 4.38 K/UL
NEUTROPHILS NFR BLD AUTO: 75.8 %
NITRITE URINE: NEGATIVE
PH URINE: 5.5
PHOSPHATE SERPL-MCNC: 2.5 MG/DL
PLATELET # BLD AUTO: 151 K/UL
POTASSIUM SERPL-SCNC: 4.4 MMOL/L
PROT SERPL-MCNC: 6.1 G/DL
PROT UR-MCNC: 24 MG/DL
PROTEIN URINE: NORMAL MG/DL
RBC # BLD: 2.54 M/UL
RBC # FLD: 14.6 %
RED BLOOD CELLS URINE: 1 /HPF
REVIEW: NORMAL
SODIUM SERPL-SCNC: 134 MMOL/L
SPECIFIC GRAVITY URINE: 1.01
TACROLIMUS SERPL-MCNC: 8.7 NG/ML
UROBILINOGEN URINE: 0.2 MG/DL
WBC # FLD AUTO: 5.78 K/UL
WBC CLUMPS: PRESENT
WHITE BLOOD CELLS URINE: 6 /HPF

## 2024-04-12 ENCOUNTER — NON-APPOINTMENT (OUTPATIENT)
Age: 69
End: 2024-04-12

## 2024-04-12 ENCOUNTER — APPOINTMENT (OUTPATIENT)
Dept: TRANSPLANT | Facility: CLINIC | Age: 69
End: 2024-04-12

## 2024-04-12 ENCOUNTER — OUTPATIENT (OUTPATIENT)
Dept: OUTPATIENT SERVICES | Facility: HOSPITAL | Age: 69
LOS: 1 days | End: 2024-04-12

## 2024-04-12 DIAGNOSIS — Z87.898 PERSONAL HISTORY OF OTHER SPECIFIED CONDITIONS: Chronic | ICD-10-CM

## 2024-04-12 DIAGNOSIS — Z89.429 ACQUIRED ABSENCE OF OTHER TOE(S), UNSPECIFIED SIDE: Chronic | ICD-10-CM

## 2024-04-12 DIAGNOSIS — Z98.84 BARIATRIC SURGERY STATUS: Chronic | ICD-10-CM

## 2024-04-12 DIAGNOSIS — Z98.49 CATARACT EXTRACTION STATUS, UNSPECIFIED EYE: Chronic | ICD-10-CM

## 2024-04-16 ENCOUNTER — APPOINTMENT (OUTPATIENT)
Dept: INFECTIOUS DISEASE | Facility: CLINIC | Age: 69
End: 2024-04-16
Payer: MEDICARE

## 2024-04-16 PROCEDURE — 99442: CPT | Mod: 93

## 2024-04-17 ENCOUNTER — APPOINTMENT (OUTPATIENT)
Dept: NEPHROLOGY | Facility: CLINIC | Age: 69
End: 2024-04-17
Payer: MEDICARE

## 2024-04-17 VITALS
SYSTOLIC BLOOD PRESSURE: 178 MMHG | WEIGHT: 202 LBS | HEIGHT: 72 IN | OXYGEN SATURATION: 100 % | DIASTOLIC BLOOD PRESSURE: 85 MMHG | BODY MASS INDEX: 27.36 KG/M2 | HEART RATE: 67 BPM | TEMPERATURE: 97.5 F

## 2024-04-17 LAB
ALBUMIN SERPL ELPH-MCNC: 4.3 G/DL
ALP BLD-CCNC: 144 U/L
ALT SERPL-CCNC: 18 U/L
ANION GAP SERPL CALC-SCNC: 11 MMOL/L
AST SERPL-CCNC: 20 U/L
BASOPHILS # BLD AUTO: 0.03 K/UL
BASOPHILS NFR BLD AUTO: 0.5 %
BILIRUB SERPL-MCNC: 0.3 MG/DL
BUN SERPL-MCNC: 13 MG/DL
CALCIUM SERPL-MCNC: 9.4 MG/DL
CHLORIDE SERPL-SCNC: 96 MMOL/L
CO2 SERPL-SCNC: 27 MMOL/L
CREAT SERPL-MCNC: 1.22 MG/DL
CREAT SPEC-SCNC: 93 MG/DL
CREAT/PROT UR: 0.2 RATIO
EGFR: 65 ML/MIN/1.73M2
EOSINOPHIL # BLD AUTO: 0.07 K/UL
EOSINOPHIL NFR BLD AUTO: 1.2 %
GLUCOSE SERPL-MCNC: 110 MG/DL
HCT VFR BLD CALC: 28.8 %
HGB BLD-MCNC: 9.7 G/DL
IMM GRANULOCYTES NFR BLD AUTO: 0.3 %
LYMPHOCYTES # BLD AUTO: 1.21 K/UL
LYMPHOCYTES NFR BLD AUTO: 20.3 %
MAGNESIUM SERPL-MCNC: 1.5 MG/DL
MAN DIFF?: NORMAL
MCHC RBC-ENTMCNC: 33.7 GM/DL
MCHC RBC-ENTMCNC: 34.4 PG
MCV RBC AUTO: 102.1 FL
MONOCYTES # BLD AUTO: 0.34 K/UL
MONOCYTES NFR BLD AUTO: 5.7 %
NEUTROPHILS # BLD AUTO: 4.3 K/UL
NEUTROPHILS NFR BLD AUTO: 72 %
PHOSPHATE SERPL-MCNC: 2.5 MG/DL
PLATELET # BLD AUTO: 156 K/UL
POTASSIUM SERPL-SCNC: 4.1 MMOL/L
PROT SERPL-MCNC: 6.5 G/DL
PROT UR-MCNC: 22 MG/DL
RBC # BLD: 2.82 M/UL
RBC # FLD: 14.6 %
SODIUM SERPL-SCNC: 135 MMOL/L
TACROLIMUS SERPL-MCNC: 12.4 NG/ML
URATE SERPL-MCNC: 6.9 MG/DL
WBC # FLD AUTO: 5.97 K/UL

## 2024-04-17 PROCEDURE — 99215 OFFICE O/P EST HI 40 MIN: CPT

## 2024-04-17 RX ORDER — CALCIUM CARBONATE/VITAMIN D3 500-10/5ML
LIQUID (ML) ORAL DAILY
Refills: 0 | Status: DISCONTINUED | COMMUNITY
End: 2024-04-17

## 2024-04-17 RX ORDER — MAGNESIUM OXIDE 241.3 MG/1000MG
400 TABLET ORAL TWICE DAILY
Qty: 60 | Refills: 3 | Status: ACTIVE | COMMUNITY
Start: 2024-04-17 | End: 1900-01-01

## 2024-04-17 RX ORDER — MOMETASONE FUROATE 1 MG/G
0.1 CREAM TOPICAL
Refills: 0 | Status: DISCONTINUED | COMMUNITY
Start: 2020-10-01 | End: 2024-04-17

## 2024-04-17 NOTE — HISTORY OF PRESENT ILLNESS
[FreeTextEntry1] : 68-year-old man with ESRD on HD since 6/2022, received LURD kidney transplant from a 32-year-old altruistic donor on 3/25/24  PMH: CKD and proteinuria related to obesity (?FSGS) dx in 1985, Biopsy in 1989,Obesity lap band in 2010 with 150lbs weight loss, deflated since 2019, DM dx 1992 never on insulin, HLD, Hypothyroidism, Portal HTN and liver fibrosis likely duet o metabolic d/o associated steatotic liver disease, PVD s/p left toe amputation 2019, KRYSTIAN improved after weight loss, h/o pericardial effusion s/p pericardiocentesis 11/2022, left forearm AV fistula. Strong family history of kidney disease, Mom had CKD, Sister had a kidney transplant.  Lives with wife. Retired in 2021  CMV D-/R-, EBV D+/R+, HLA mismatch 2, 2, 2. No DSA. Pt had asymptomatic COVID +ve infection on pre op testing, repeat negative after admission. Donor has latent TB, recipient started on INH/B6 Did well post op. He was discharged on post op day 4 with a serum creatinine of 1.1mg/dL.   Seen by Dr. Rosenberg last week. No medication changes. No major events.  He feels well. Voiding urine without difficulty. No dysuria, no hematuria, nocturia several times, stream is good.  Pain at the incision site is much better, using Tramadol rarely.  No nausea or vomiting. No diarrhea. Appetite is good.  No fever, chills.  No chest pain or shortness of breath. Taking all medications as prescribed  /50- 130/60's  Blood glucose fasting , random < 200 for the most part.  Weight 202 lbs, Feels like he is losing water weight. Leg swelling has subsided.  Ambulating with a walker since surgery, getting PT 2x per week at home. Driving.

## 2024-04-17 NOTE — PHYSICAL EXAM
After Visit Summary   7/27/2017    Kelly Mansfield    MRN: [id number]           Patient Information     Date Of Birth          6/8/1929        Visit Information        Provider Department      7/27/2017 2:00 PM Mitul Segura MD [facility]        Today's Diagnoses     Primary osteoarthritis of left shoulder    -  1    Shoulder impingement, left          Care Instructions    You have had a steroid injection today.  For the first 2 hours there will likely be some numbing in the joint from the lidocaine.  This is a good sign, indicating that the injection is in the right place.  In 2 hours the lidocaine will wear off, and the joint will hurt like you had a shot.  Each day the cortisone makes it feel better.  It reaches peak effect in 2 weeks.  We expect it to last for 3 months.  You may resume regular activity when you feel ready.  If you are diabetic, your glucoses will be quite high for several days.            Follow-ups after your visit        Your next 10 appointments already scheduled     Aug 10, 2017 10:00 AM CDT   New Visit with Barrington Pace MD   [facility] ([facility])    [address] [id number] [phone]            Aug 31, 2017  9:15 AM CDT   MA SCREENING DIGITAL BILATERAL with FKMA1   [facility] ([facility])    [address] [place] [id number] [phone]           Do not use any powder, lotion or deodorant under your arms or on your breast. If you do, we will ask you to remove it before your exam.  Wear comfortable, two-piece clothing.  If you have any allergies, tell your care team.  Bring any previous mammograms from other facilities or have them mailed to the breast center.              Who to contact     If you have questions or need follow up information about today's clinic visit or your schedule please contact [facility] directly at  "[phone].  Normal or non-critical lab and imaging results will be communicated to you by [employer], letter or phone within 4 business days after the clinic has received the results. If you do not hear from us within 7 days, please contact the clinic through [employer] or phone. If you have a critical or abnormal lab result, we will notify you by phone as soon as possible.  Submit refill requests through [employer] or call your pharmacy and they will forward the refill request to us. Please allow 3 business days for your refill to be completed.          Additional Information About Your Visit        [employer] [facility] [employer] Information     [employer] lets you send messages to your doctor, view your test results, renew your prescriptions, schedule appointments and more. To sign up, go to [email or web address] . Click on \"Log in\" on the left side of the screen, which will take you to the Welcome page. Then click on \"Sign up Now\" on the right side of the page.     You will be asked to enter the access code listed below, as well as some personal information. Please follow the directions to create your username and password.     Your access code is: [email or web address]  Expires: 10/25/2017  2:13 PM     Your access code will  in 90 days. If you need help or a new code, please call your [facility] clinic or [phone].        Care EveryWhere ID     This is your Care EveryWhere ID. This could be used by other organizations to access your [facility] medical records  [id number]        Your Vitals Were     Temperature Respirations Height BMI (Body Mass Index)          98.5  F (36.9  C) 18 1.473 m (4' 10\") 37.2 kg/m2         Blood Pressure from Last 3 Encounters:   17 122/78   17 130/80   17 142/74    Weight from Last 3 Encounters:   17 80.7 kg (178 lb)   17 80.7 kg (178 lb)   17 78.9 kg (174 lb)              We Performed the Following     DRAIN/INJECT LARGE JOINT/BURSA     TRIAMCINOLONE ACET INJ NOS        "   Today's Medication Changes          These changes are accurate as of: 7/27/17  2:13 PM.  If you have any questions, ask your nurse or doctor.               Start taking these medicines.        Dose/Directions    triamcinolone acetonide 40 MG/ML injection   Commonly known as:  KENALOG   Used for:  Primary osteoarthritis of left shoulder, Shoulder impingement, left   Started by:  Mitul Segura MD        Dose:  40 mg   1 mL (40 mg) by INTRA-ARTICULAR route once for 1 dose   Quantity:  1 mL   Refills:  0            Where to get your medicines      Some of these will need a paper prescription and others can be bought over the counter.  Ask your nurse if you have questions.     You don't need a prescription for these medications     triamcinolone acetonide 40 MG/ML injection                Primary Care Provider Office Phone # Fax #    Tatianna Mota [phone]       [address] [address]        Equal Access to Services     [facility]DR [facility] AH: Hadii juan ku hadasho Soomaali, waaxda luqadaha, qaybta kaalmada adeegyada, waxay mathew haybjn gretchen chen . So [facility] [phone].    ATENCIÓN: Si habla español, tiene a maria disposición servicios gratuitos de asistencia lingüística. LlAshtabula General [facility] [phone].    We comply with applicable federal civil rights laws and [place] laws. We do not discriminate on the basis of race, color, national origin, age, disability sex, sexual orientation or gender identity.            Thank you!     Thank you for choosing [facility]  for your care. Our goal is always to provide you with excellent care. Hearing back from our patients is one way we can continue to improve our services. Please take a few minutes to complete the written survey that you may receive in the mail after your visit with us. Thank you!             Your Updated Medication List - Protect others around you: Learn how to safely use, store and throw  [General Appearance - Alert] : alert away your medicines at www.disposemymeds.org.          This list is accurate as of: 7/27/17  2:13 PM.  Always use your most recent med list.                   Brand Name Dispense Instructions for use Diagnosis    albuterol 108 (90 BASE) MCG/ACT Inhaler    PROAIR HFA/PROVENTIL HFA/VENTOLIN HFA    1 Inhaler    Inhale 2 puffs into the lungs every 4 hours as needed    Mild persistent asthma without complication       aspirin 81 MG tablet      Take by mouth daily    TIA (transient ischemic attack)       atorvastatin 20 MG tablet    LIPITOR    90 tablet    TAKE ONE TABLET BY MOUTH EVERY DAY (MUST HAVE FASTING LABS FOR FURTHER REFILLS)    Hyperlipidemia LDL goal <130       CALCIUM 1200 PO      1800mg daily        celecoxib 100 MG capsule    celeBREX    60 capsule    Take 1 capsule (100 mg) by mouth 2 times daily as needed for moderate pain    Lumbar spinal stenosis       cholecalciferol 1000 UNIT tablet    vitamin D     1 TABLET DAILY        clopidogrel 75 MG tablet    PLAVIX    90 tablet    Take 1 tablet (75 mg) by mouth daily    History of TIA (transient ischemic attack) and stroke       fluticasone 50 MCG/ACT spray    FLONASE    16 g    Spray 2 sprays into both nostrils 2 times daily    Nonallergic rhinitis       lisinopril 5 MG tablet    PRINIVIL/ZESTRIL    90 tablet    Take 1 tablet (5 mg) by mouth daily    Hypertension with target blood pressure goal under 150/90       mometasone-formoterol 200-5 MCG/ACT oral inhaler    DULERA    1 Inhaler    Inhale 2 puffs into the lungs 2 times daily    Mild persistent asthma without complication       OCUVITE PO      6mg daily        polyethylene glycol powder    MIRALAX/GLYCOLAX     Take 1 capful by mouth daily        triamcinolone acetonide 40 MG/ML injection    KENALOG    1 mL    1 mL (40 mg) by INTRA-ARTICULAR route once for 1 dose    Primary osteoarthritis of left shoulder, Shoulder impingement, left       VITAMIN E NATURAL PO       Other fatigue          [General Appearance - In No Acute Distress] : in no acute distress [General Appearance - Well-Appearing] : healthy appearing [Sclera] : the sclera and conjunctiva were normal [Oropharynx] : the oropharynx was normal [Neck Appearance] : the appearance of the neck was normal [Respiration, Rhythm And Depth] : normal respiratory rhythm and effort [Auscultation Breath Sounds / Voice Sounds] : lungs were clear to auscultation bilaterally [Exaggerated Use Of Accessory Muscles For Inspiration] : no accessory muscle use [Heart Rate And Rhythm] : heart rate was normal and rhythm regular [Heart Sounds] : normal S1 and S2 [Heart Sounds Gallop] : no gallops [Bowel Sounds] : normal bowel sounds [Abdomen Soft] : soft [Abdomen Tenderness] : non-tender [] : no rash [No Focal Deficits] : no focal deficits [Oriented To Time, Place, And Person] : oriented to person, place, and time [Edema] : there was no peripheral edema [FreeTextEntry1] : RLQ incision clean, dry and intact . No tenderness.  [___ (cm) Fistula] : [unfilled] (cm) fistula [Bruit] : a bruit was present [Thrill] : a thrill was present

## 2024-04-17 NOTE — ASSESSMENT
[FreeTextEntry1] :  68-year-old man with ESRD on HD since 6/2022, received LURD kidney transplant from a 32-year-old altruistic donor on 3/25/24 PMH: CKD and proteinuria related to obesity (?FSGS) dx in 1985, Biopsy in 1989, Obesity lap band in 2010 with 150lbs weight loss, deflated since 2019, DM dx 1992 never on insulin, HLD, Hypothyroidism, Portal HTN and liver fibrosis likely due to metabolic d/o associated steatotic liver disease, PVD s/p left toe amputation 2019, KRYSTIAN improved after weight loss, h/o pericardial effusion s/p pericardiocentesis 11/2022, left forearm AV fistula.  S/p LURD transplant 3/25/24 Cr 1.2mg/dL stable, No significant proteinuria UPCR 0.3 last week down from 5.6 grams pre transplant, pending today.   Immunosuppression Simulect induction Envarsus 6mg daily. Level 12.4 high. Reduce to 5mg. Aim for trough 8-10 Continue MMF 1-gram po bid Prednisone 5mg daily  Infection prophylaxis Nystatin swish and swallow qid - will complete by next week  Bactrim SS daily Acyclovir 400mg po bid for 3 months (CMV low risk) INH/B6 x 9 months for donor with latent TB  Anemia - Multifactorial CKD, medications, recent surgery.  Hgb improving 9.7 up from 8.6   Diabetes II- controlled. Continue Prandin 1mg po TID with meals  HTN and h/o arrythmia - previously on metoprolol low dose and midodrine for intradialytic hypotension On Metoprolol 25mg po bid Lasix 40mg po daily   Hypothyroidism - continue Synthroid 125mcg po daily  Hypomagnesemia - start mg oxide 400gm po daily   F/u with surgery next week with me on 5/1/24  Needs stent removal in ~ 4 weeks.

## 2024-04-18 LAB
APPEARANCE: CLEAR
BACTERIA: NEGATIVE /HPF
BILIRUBIN URINE: NEGATIVE
BLOOD URINE: NEGATIVE
CAST: 0 /LPF
COLOR: YELLOW
EPITHELIAL CELLS: 1 /HPF
GLUCOSE QUALITATIVE U: NEGATIVE MG/DL
KETONES URINE: NEGATIVE MG/DL
LEUKOCYTE ESTERASE URINE: ABNORMAL
MICROSCOPIC-UA: NORMAL
NITRITE URINE: NEGATIVE
PH URINE: 6
PROTEIN URINE: NORMAL MG/DL
RED BLOOD CELLS URINE: 1 /HPF
SPECIFIC GRAVITY URINE: 1.02
UROBILINOGEN URINE: 0.2 MG/DL
WHITE BLOOD CELLS URINE: 10 /HPF

## 2024-04-18 NOTE — HISTORY OF PRESENT ILLNESS
[Home] : at home, [unfilled] , at the time of the visit. [Medical Office: ([facility])___] : at the medical office located in  [Verbal consent obtained from patient] : the patient, [unfilled] [FreeTextEntry1] : 68 year old male with ESRD on HD since 06/2022 on M/W/F ( via left forearm AV fistula), DM2, neuropathy, scoliosis, MASH/MASLD who presents for TID follow up. s/p living donor renal transplant 3/25/24. Donor with positive Quantiferon Gold.  Started on INH+B6 on 3/26/24 for latent MTB therapy. Denies any N/V/D. Notes compliance. No acute complaints.

## 2024-04-18 NOTE — ASSESSMENT
[FreeTextEntry1] : 68 year old male with ESRD on HD since 06/2022 on M/W/F ( via left forearm AV fistula), DM2, neuropathy, scoliosis, MASH/MASLD who presents for TID follow up. s/p living donor renal transplant 3/25/24.   Donor with positive Quantiferon Gold.   Started on INH+B6 on 3/26/24 for latent MTB therapy.   #Donor Positive QuantiFeron Gold Imaging in donor without evidence of active MTB --Continue Isoniazid 300 mg Q24H and Vitamin B6 50 mg Q24H --will monitor LFT's ~every 2 weeks given diagnosis of MAFLD/MASH.  #Renal Transplant Recipient, Prophylactic Antibiotic (CMV -/-, EBV +/+) --CMV -/-: continue Acyclovir for viral prophylaxis  --Continue Bactrim SS PO Q24H for PCP PPx  Follow up: 1 month in person for followup on prior foot wounds

## 2024-04-24 ENCOUNTER — APPOINTMENT (OUTPATIENT)
Dept: TRANSPLANT | Facility: CLINIC | Age: 69
End: 2024-04-24

## 2024-04-24 VITALS
OXYGEN SATURATION: 99 % | DIASTOLIC BLOOD PRESSURE: 92 MMHG | TEMPERATURE: 97.9 F | SYSTOLIC BLOOD PRESSURE: 173 MMHG | BODY MASS INDEX: 27.63 KG/M2 | HEIGHT: 72 IN | WEIGHT: 204 LBS | HEART RATE: 77 BPM | RESPIRATION RATE: 16 BRPM

## 2024-04-24 PROCEDURE — 99214 OFFICE O/P EST MOD 30 MIN: CPT | Mod: 24

## 2024-04-24 RX ORDER — NYSTATIN 100000 [USP'U]/ML
100000 SUSPENSION ORAL 4 TIMES DAILY
Qty: 1 | Refills: 0 | Status: DISCONTINUED | COMMUNITY
Start: 2024-03-27 | End: 2024-04-24

## 2024-04-24 RX ORDER — FUROSEMIDE 40 MG/1
40 TABLET ORAL DAILY
Qty: 14 | Refills: 0 | Status: DISCONTINUED | COMMUNITY
Start: 2024-04-10 | End: 2024-04-24

## 2024-04-24 NOTE — ASSESSMENT
[FreeTextEntry1] : one month post living donor kidney transplant doing well good graft function; last creatinine -last week 1.2 Lasix stopped immunosuppression tacro, mmf and steroids blood pressure well controlled no changes for now will check labs today

## 2024-04-24 NOTE — HISTORY OF PRESENT ILLNESS
[FreeTextEntry4] : 68-year-old man with ESRD on HD since 6/2022, received LURD kidney transplant from a 32-year-old altruistic donor on 3/25/24  PMH: CKD and proteinuria related to obesity (?FSGS) dx in 1985, Biopsy in 1989,Obesity lap band in 2010 with 150lbs weight loss, deflated since 2019, DM dx 1992 never on insulin, HLD, Hypothyroidism, Portal HTN and liver fibrosis likely duet o metabolic d/o associated steatotic liver disease, PVD s/p left toe amputation 2019, KRYSTIAN improved after weight loss, h/o pericardial effusion s/p pericardiocentesis 11/2022, left forearm AV fistula. Strong family history of kidney disease, Mom had CKD, Sister had a kidney transplant. Lives with wife. Retired in 2021  CMV D-/R-, EBV D+/R+, HLA mismatch 2, 2, 2. No DSA. Pt had asymptomatic COVID +ve infection on pre op testing, repeat negative after admission. Donor has latent TB, recipient started on INH/B6 Did well post op. He was discharged on post op day 4 with a serum creatinine of 1.1mg/dL. [de-identified] : today doing well no complaints eating and ambulating blood pressure well controlled envarsus dose was reduced last week to 5mg for a level of 12.4

## 2024-04-25 ENCOUNTER — NON-APPOINTMENT (OUTPATIENT)
Age: 69
End: 2024-04-25

## 2024-04-25 LAB
ALBUMIN SERPL ELPH-MCNC: 4.3 G/DL
ALP BLD-CCNC: 148 U/L
ALT SERPL-CCNC: 28 U/L
ANION GAP SERPL CALC-SCNC: 12 MMOL/L
APPEARANCE: CLEAR
AST SERPL-CCNC: 25 U/L
BACTERIA: NEGATIVE /HPF
BASOPHILS # BLD AUTO: 0.03 K/UL
BASOPHILS NFR BLD AUTO: 0.4 %
BILIRUB SERPL-MCNC: 0.3 MG/DL
BILIRUBIN URINE: NEGATIVE
BLOOD URINE: NEGATIVE
BUN SERPL-MCNC: 11 MG/DL
CALCIUM SERPL-MCNC: 9.7 MG/DL
CAST: 0 /LPF
CHLORIDE SERPL-SCNC: 95 MMOL/L
CO2 SERPL-SCNC: 27 MMOL/L
COLOR: YELLOW
CREAT SERPL-MCNC: 1.33 MG/DL
CREAT SPEC-SCNC: 130 MG/DL
CREAT/PROT UR: 0.3 RATIO
EGFR: 59 ML/MIN/1.73M2
EOSINOPHIL # BLD AUTO: 0.11 K/UL
EOSINOPHIL NFR BLD AUTO: 1.6 %
EPITHELIAL CELLS: 0 /HPF
GLUCOSE QUALITATIVE U: 100 MG/DL
GLUCOSE SERPL-MCNC: 157 MG/DL
HBV SURFACE AG SER QL: NONREACTIVE
HCT VFR BLD CALC: 31 %
HCV AB SER QL: NONREACTIVE
HCV RNA SERPL NAA+PROBE-LOG IU: NOT DETECTED LOGIU/ML
HCV S/CO RATIO: 0.05 S/CO
HEPB DNA PCR INT: NOT DETECTED
HEPB DNA PCR LOG: NOT DETECTED LOGIU/ML
HEPC RNA INTERP: NOT DETECTED
HGB BLD-MCNC: 10.6 G/DL
HIV1 RNA # SERPL NAA+PROBE: NORMAL
HIV1 RNA # SERPL NAA+PROBE: NORMAL COPIES/ML
HIV1+2 AB SPEC QL IA.RAPID: NONREACTIVE
IMM GRANULOCYTES NFR BLD AUTO: 0.9 %
KETONES URINE: NEGATIVE MG/DL
LEUKOCYTE ESTERASE URINE: ABNORMAL
LYMPHOCYTES # BLD AUTO: 1.35 K/UL
LYMPHOCYTES NFR BLD AUTO: 19.7 %
MAGNESIUM SERPL-MCNC: 2.1 MG/DL
MAN DIFF?: NORMAL
MCHC RBC-ENTMCNC: 34.2 GM/DL
MCHC RBC-ENTMCNC: 34.8 PG
MCV RBC AUTO: 101.6 FL
MICROSCOPIC-UA: NORMAL
MONOCYTES # BLD AUTO: 0.46 K/UL
MONOCYTES NFR BLD AUTO: 6.7 %
NEUTROPHILS # BLD AUTO: 4.83 K/UL
NEUTROPHILS NFR BLD AUTO: 70.7 %
NITRITE URINE: NEGATIVE
PH URINE: 6.5
PHOSPHATE SERPL-MCNC: 2.7 MG/DL
PLATELET # BLD AUTO: 154 K/UL
POTASSIUM SERPL-SCNC: 4.7 MMOL/L
PROT SERPL-MCNC: 6.9 G/DL
PROT UR-MCNC: 34 MG/DL
PROTEIN URINE: 30 MG/DL
RBC # BLD: 3.05 M/UL
RBC # FLD: 14.6 %
RED BLOOD CELLS URINE: 2 /HPF
SODIUM SERPL-SCNC: 134 MMOL/L
SPECIFIC GRAVITY URINE: 1.02
TACROLIMUS SERPL-MCNC: 9.5 NG/ML
URATE SERPL-MCNC: 7.1 MG/DL
UROBILINOGEN URINE: 0.2 MG/DL
VIRAL LOAD INTERP: NORMAL
VIRAL LOAD LOG: NORMAL LG COP/ML
WBC # FLD AUTO: 6.84 K/UL
WHITE BLOOD CELLS URINE: 8 /HPF

## 2024-04-28 LAB — BKV DNA SPEC QL NAA+PROBE: NOT DETECTED IU/ML

## 2024-04-29 RX ORDER — ISONIAZID 300 MG/1
300 TABLET ORAL DAILY
Qty: 30 | Refills: 2 | Status: ACTIVE | COMMUNITY
Start: 2024-03-27 | End: 1900-01-01

## 2024-05-01 ENCOUNTER — APPOINTMENT (OUTPATIENT)
Dept: NEPHROLOGY | Facility: CLINIC | Age: 69
End: 2024-05-01
Payer: MEDICARE

## 2024-05-01 VITALS
OXYGEN SATURATION: 100 % | WEIGHT: 204 LBS | DIASTOLIC BLOOD PRESSURE: 84 MMHG | TEMPERATURE: 98.3 F | SYSTOLIC BLOOD PRESSURE: 139 MMHG | BODY MASS INDEX: 27.63 KG/M2 | RESPIRATION RATE: 16 BRPM | HEIGHT: 72 IN | HEART RATE: 70 BPM

## 2024-05-01 LAB
ALBUMIN SERPL ELPH-MCNC: 4.3 G/DL
ALP BLD-CCNC: 122 U/L
ALT SERPL-CCNC: 24 U/L
ANION GAP SERPL CALC-SCNC: 11 MMOL/L
APPEARANCE: CLEAR
AST SERPL-CCNC: 20 U/L
BACTERIA: NEGATIVE /HPF
BASOPHILS # BLD AUTO: 0.02 K/UL
BASOPHILS NFR BLD AUTO: 0.3 %
BILIRUB SERPL-MCNC: 0.3 MG/DL
BILIRUBIN URINE: NEGATIVE
BLOOD URINE: NEGATIVE
BUN SERPL-MCNC: 14 MG/DL
CALCIUM SERPL-MCNC: 9.7 MG/DL
CAST: 1 /LPF
CHLORIDE SERPL-SCNC: 97 MMOL/L
CO2 SERPL-SCNC: 26 MMOL/L
COLOR: YELLOW
CREAT SERPL-MCNC: 1.12 MG/DL
CREAT SPEC-SCNC: 94 MG/DL
CREAT/PROT UR: 0.2 RATIO
EGFR: 72 ML/MIN/1.73M2
EOSINOPHIL # BLD AUTO: 0.06 K/UL
EOSINOPHIL NFR BLD AUTO: 0.8 %
EPITHELIAL CELLS: 1 /HPF
GLUCOSE QUALITATIVE U: >=1000 MG/DL
GLUCOSE SERPL-MCNC: 167 MG/DL
HCT VFR BLD CALC: 31.1 %
HGB BLD-MCNC: 10.6 G/DL
IMM GRANULOCYTES NFR BLD AUTO: 0.8 %
KETONES URINE: NEGATIVE MG/DL
LEUKOCYTE ESTERASE URINE: ABNORMAL
LYMPHOCYTES # BLD AUTO: 1.29 K/UL
LYMPHOCYTES NFR BLD AUTO: 16.4 %
MAGNESIUM SERPL-MCNC: 2.3 MG/DL
MAN DIFF?: NORMAL
MCHC RBC-ENTMCNC: 34.1 GM/DL
MCHC RBC-ENTMCNC: 34.5 PG
MCV RBC AUTO: 101.3 FL
MICROSCOPIC-UA: NORMAL
MONOCYTES # BLD AUTO: 0.4 K/UL
MONOCYTES NFR BLD AUTO: 5.1 %
NEUTROPHILS # BLD AUTO: 6.05 K/UL
NEUTROPHILS NFR BLD AUTO: 76.6 %
NITRITE URINE: NEGATIVE
PH URINE: 6
PHOSPHATE SERPL-MCNC: 3.4 MG/DL
PLATELET # BLD AUTO: 152 K/UL
POTASSIUM SERPL-SCNC: 4.3 MMOL/L
PROT SERPL-MCNC: 6.6 G/DL
PROT UR-MCNC: 20 MG/DL
PROTEIN URINE: NORMAL MG/DL
RBC # BLD: 3.07 M/UL
RBC # FLD: 14.7 %
RED BLOOD CELLS URINE: 1 /HPF
SODIUM SERPL-SCNC: 134 MMOL/L
SPECIFIC GRAVITY URINE: 1.02
TACROLIMUS SERPL-MCNC: 12.7 NG/ML
URATE SERPL-MCNC: 5.4 MG/DL
UROBILINOGEN URINE: 0.2 MG/DL
WBC # FLD AUTO: 7.88 K/UL
WHITE BLOOD CELLS URINE: 6 /HPF

## 2024-05-01 PROCEDURE — 99215 OFFICE O/P EST HI 40 MIN: CPT

## 2024-05-01 RX ORDER — TACROLIMUS 1 MG/1
1 TABLET, EXTENDED RELEASE ORAL
Qty: 30 | Refills: 5 | Status: DISCONTINUED | COMMUNITY
Start: 2024-03-27 | End: 2024-05-01

## 2024-05-01 NOTE — HISTORY OF PRESENT ILLNESS
[FreeTextEntry1] : 68-year-old man with ESRD on HD since 6/2022, received LURD kidney transplant from a 32-year-old altruistic donor on 3/25/24  PMH: CKD and proteinuria related to obesity (?FSGS) dx in 1985, Biopsy in 1989,Obesity lap band in 2010 with 150lbs weight loss, deflated since 2019, DM dx 1992 never on insulin, HLD, Hypothyroidism, Portal HTN and liver fibrosis likely duet o metabolic d/o associated steatotic liver disease, PVD s/p left toe amputation 2019, KRYSTIAN improved after weight loss, h/o pericardial effusion s/p pericardiocentesis 11/2022, left forearm AV fistula. Strong family history of kidney disease, Mom had CKD, Sister had a kidney transplant.  Lives with wife. Retired in 2021  CMV D-/R-, EBV D+/R+, HLA mismatch 2, 2, 2. No DSA. Pt had asymptomatic COVID +ve infection on pre op testing, repeat negative after admission. Donor has latent TB, recipient started on INH/B6 Did well post op. He was discharged on post op day 4 with a serum creatinine of 1.1mg/dL.   Saw Dr. Borrego last week. Lasix discontinued. No other medication changes.  He feels well. Voiding urine without difficulty. No dysuria, no hematuria, nocturia several times, stream is good.  No nausea or vomiting. No diarrhea. Appetite is good.  No fever, chills.  No chest pain or shortness of breath. Taking all medications as prescribed  -130 systolic. Rare 140's.  Blood glucose elevated due to holidays, now better controlled, on prandin alone.  Weight 204 stable.  Ambulating with a walker since surgery, Home PT ended yesterday, recommended outpt PT. Driving.

## 2024-05-01 NOTE — PHYSICAL EXAM
[General Appearance - Alert] : alert [General Appearance - In No Acute Distress] : in no acute distress [General Appearance - Well-Appearing] : healthy appearing [Sclera] : the sclera and conjunctiva were normal [Oropharynx] : the oropharynx was normal [Neck Appearance] : the appearance of the neck was normal [Respiration, Rhythm And Depth] : normal respiratory rhythm and effort [Exaggerated Use Of Accessory Muscles For Inspiration] : no accessory muscle use [Auscultation Breath Sounds / Voice Sounds] : lungs were clear to auscultation bilaterally [Heart Rate And Rhythm] : heart rate was normal and rhythm regular [Heart Sounds] : normal S1 and S2 [Heart Sounds Gallop] : no gallops [Edema] : there was no peripheral edema [Bowel Sounds] : normal bowel sounds [Abdomen Soft] : soft [Abdomen Tenderness] : non-tender [___ (cm) Fistula] : [unfilled] (cm) fistula [Bruit] : a bruit was present [Thrill] : a thrill was present [] : no rash [No Focal Deficits] : no focal deficits [Oriented To Time, Place, And Person] : oriented to person, place, and time [FreeTextEntry1] : RLQ incision clean, dry and intact . No tenderness.

## 2024-05-01 NOTE — ASSESSMENT
[FreeTextEntry1] : 68-year-old man with ESRD on HD since 6/2022, received LURD kidney transplant from a 32-year-old altruistic donor on 3/25/24 PMH: CKD and proteinuria related to obesity (?FSGS) dx in 1985, Biopsy in 1989, Obesity lap band in 2010 with 150lbs weight loss, deflated since 2019, DM dx 1992 never on insulin, HLD, Hypothyroidism, Portal HTN and liver fibrosis likely due to metabolic d/o associated steatotic liver disease, PVD s/p left toe amputation 2019, KRYSTIAN improved after weight loss, h/o pericardial effusion s/p pericardiocentesis 11/2022, left forearm AV fistula.  S/p LURD transplant 3/25/24 Cr 1.12mg/dL stable. No  proteinuria UPCR 0.2 down from 5.6 grams pre transplant  Immunosuppression Simulect induction Envarsus 5mg daily. Level 12.7 high. Reduce to 4mg daily. Aim for trough 8-10 Continue MMF 1-gram po bid Prednisone 5mg daily  Infection prophylaxis Completed Nystatin x 1 month  Bactrim SS daily Acyclovir 400mg po bid for 3 months (CMV low risk) INH/B6 x 9 months for donor with latent TB  Anemia - Multifactorial CKD, medications, recent surgery. Hgb continues to improve 10.6   Diabetes II. Continue Prandin 1mg po TID with meals, low concentrated sweet diet, exercise as tolerated.   HTN and h/o arrythmia - previously on metoprolol low dose and midodrine for intradialytic hypotension On Metoprolol 25mg po bid Lasix discontinued. Euvolemic.   Hypothyroidism - continue Synthroid 125mcg po daily  Hypomagnesemia - mg oxide 400gm po bid  Needs stent removal in ~ 2-3 weeks. F/u Labs in 2 weeks.  F/u in clinic 6/5/24

## 2024-05-06 LAB — BKV DNA SPEC QL NAA+PROBE: NOT DETECTED IU/ML

## 2024-05-08 ENCOUNTER — NON-APPOINTMENT (OUTPATIENT)
Age: 69
End: 2024-05-08

## 2024-05-14 ENCOUNTER — APPOINTMENT (OUTPATIENT)
Dept: NEPHROLOGY | Facility: CLINIC | Age: 69
End: 2024-05-14

## 2024-05-15 ENCOUNTER — TRANSCRIPTION ENCOUNTER (OUTPATIENT)
Age: 69
End: 2024-05-15

## 2024-05-15 ENCOUNTER — APPOINTMENT (OUTPATIENT)
Dept: INFECTIOUS DISEASE | Facility: CLINIC | Age: 69
End: 2024-05-15
Payer: MEDICARE

## 2024-05-15 ENCOUNTER — APPOINTMENT (OUTPATIENT)
Dept: TRANSPLANT | Facility: CLINIC | Age: 69
End: 2024-05-15

## 2024-05-15 VITALS
DIASTOLIC BLOOD PRESSURE: 81 MMHG | BODY MASS INDEX: 27.63 KG/M2 | HEIGHT: 72 IN | TEMPERATURE: 98.1 F | HEART RATE: 72 BPM | SYSTOLIC BLOOD PRESSURE: 132 MMHG | WEIGHT: 204 LBS | RESPIRATION RATE: 16 BRPM | OXYGEN SATURATION: 100 %

## 2024-05-15 LAB
ALBUMIN SERPL ELPH-MCNC: 4.4 G/DL
ALP BLD-CCNC: 103 U/L
ALT SERPL-CCNC: 17 U/L
ANION GAP SERPL CALC-SCNC: 9 MMOL/L
AST SERPL-CCNC: 15 U/L
BASOPHILS # BLD AUTO: 0.04 K/UL
BASOPHILS NFR BLD AUTO: 0.7 %
BILIRUB SERPL-MCNC: 0.4 MG/DL
BUN SERPL-MCNC: 11 MG/DL
CALCIUM SERPL-MCNC: 9.7 MG/DL
CHLORIDE SERPL-SCNC: 99 MMOL/L
CO2 SERPL-SCNC: 28 MMOL/L
CREAT SERPL-MCNC: 0.91 MG/DL
CREAT SPEC-SCNC: 162 MG/DL
CREAT/PROT UR: 0.2 RATIO
EGFR: 93 ML/MIN/1.73M2
EOSINOPHIL # BLD AUTO: 0.1 K/UL
EOSINOPHIL NFR BLD AUTO: 1.7 %
GLUCOSE SERPL-MCNC: 124 MG/DL
HCT VFR BLD CALC: 32.6 %
HGB BLD-MCNC: 11.2 G/DL
IMM GRANULOCYTES NFR BLD AUTO: 0.5 %
LYMPHOCYTES # BLD AUTO: 1.06 K/UL
LYMPHOCYTES NFR BLD AUTO: 17.8 %
MAGNESIUM SERPL-MCNC: 1.9 MG/DL
MAN DIFF?: NORMAL
MCHC RBC-ENTMCNC: 34.4 GM/DL
MCHC RBC-ENTMCNC: 34.8 PG
MCV RBC AUTO: 101.2 FL
MONOCYTES # BLD AUTO: 0.35 K/UL
MONOCYTES NFR BLD AUTO: 5.9 %
NEUTROPHILS # BLD AUTO: 4.36 K/UL
NEUTROPHILS NFR BLD AUTO: 73.4 %
PHOSPHATE SERPL-MCNC: 3 MG/DL
PLATELET # BLD AUTO: 142 K/UL
POTASSIUM SERPL-SCNC: 3.8 MMOL/L
PROT SERPL-MCNC: 6.7 G/DL
PROT UR-MCNC: 37 MG/DL
RBC # BLD: 3.22 M/UL
RBC # FLD: 13.9 %
SODIUM SERPL-SCNC: 136 MMOL/L
TACROLIMUS SERPL-MCNC: 6.1 NG/ML
WBC # FLD AUTO: 5.94 K/UL

## 2024-05-15 PROCEDURE — 99213 OFFICE O/P EST LOW 20 MIN: CPT

## 2024-05-15 RX ORDER — TACROLIMUS 4 MG/1
4 TABLET, EXTENDED RELEASE ORAL
Qty: 30 | Refills: 5 | Status: DISCONTINUED | COMMUNITY
Start: 2024-03-27 | End: 2024-05-15

## 2024-05-15 NOTE — HISTORY OF PRESENT ILLNESS
[FreeTextEntry1] : 68 year old male with ESRD on HD since 06/2022 on M/W/F ( via left forearm AV fistula), DM2, neuropathy, scoliosis, MASH/MASLD who presents for TID follow up. s/p living donor renal transplant 3/25/24. Donor with positive Quantiferon Gold.  Started on INH+B6 on 3/26/24 for latent MTB therapy. Denies any N/V/D. Notes compliance. No acute complaints.

## 2024-05-15 NOTE — ASSESSMENT
[FreeTextEntry1] : 68 year old male with ESRD on HD since 06/2022 on M/W/F ( via left forearm AV fistula), DM2, neuropathy, scoliosis, MASH/MASLD who presents for TID follow up. s/p living donor renal transplant 3/25/24.   Donor with positive Quantiferon Gold.   Started on INH+B6 on 3/26/24 for latent MTB therapy.   History of osteomyelitis of the left lower extremity great toe treated with high-dose p.o. Bactrim prior to transplant I examined patient's lower extremities today.  No evidence of active infection on either extremity.  #Donor Positive QuantiFeron Gold Imaging in donor without evidence of active MTB --Continue Isoniazid 300 mg Q24H and Vitamin B6 50 mg Q24H --will monitor LFT's ~every 2 weeks given diagnosis of MAFLD/MASH.  #Renal Transplant Recipient, Prophylactic Antibiotic (CMV -/-, EBV +/+) --Continue Bactrim SS PO Q24H for PCP PPx  Follow up: 1 month

## 2024-05-15 NOTE — PHYSICAL EXAM
[General Appearance - Alert] : alert [General Appearance - In No Acute Distress] : in no acute distress [Sclera] : the sclera and conjunctiva were normal [Outer Ear] : the ears and nose were normal in appearance [Neck Appearance] : the appearance of the neck was normal [Auscultation Breath Sounds / Voice Sounds] : lungs were clear to auscultation bilaterally [Heart Rate And Rhythm] : heart rate was normal and rhythm regular [Heart Sounds] : normal S1 and S2 [Heart Sounds Gallop] : no gallops [Murmurs] : no murmurs [Heart Sounds Pericardial Friction Rub] : no pericardial rub [Edema] : there was no peripheral edema [Bowel Sounds] : normal bowel sounds [Abdomen Soft] : soft [Abdomen Tenderness] : non-tender [Abdomen Mass (___ Cm)] : no abdominal mass palpated [No Palpable Adenopathy] : no palpable adenopathy [Musculoskeletal - Swelling] : no joint swelling [Nail Clubbing] : no clubbing  or cyanosis of the fingernails [Motor Tone] : muscle strength and tone were normal [Skin Color & Pigmentation] : normal skin color and pigmentation [] : no rash [Oriented To Time, Place, And Person] : oriented to person, place, and time [Affect] : the affect was normal

## 2024-05-16 ENCOUNTER — OUTPATIENT (OUTPATIENT)
Dept: OUTPATIENT SERVICES | Facility: HOSPITAL | Age: 69
LOS: 1 days | End: 2024-05-16
Payer: MEDICARE

## 2024-05-16 ENCOUNTER — TRANSCRIPTION ENCOUNTER (OUTPATIENT)
Age: 69
End: 2024-05-16

## 2024-05-16 ENCOUNTER — APPOINTMENT (OUTPATIENT)
Dept: TRANSPLANT | Facility: HOSPITAL | Age: 69
End: 2024-05-16

## 2024-05-16 VITALS
TEMPERATURE: 97 F | SYSTOLIC BLOOD PRESSURE: 177 MMHG | DIASTOLIC BLOOD PRESSURE: 79 MMHG | RESPIRATION RATE: 18 BRPM | OXYGEN SATURATION: 99 % | HEART RATE: 58 BPM

## 2024-05-16 VITALS
HEART RATE: 64 BPM | WEIGHT: 202.6 LBS | SYSTOLIC BLOOD PRESSURE: 170 MMHG | OXYGEN SATURATION: 100 % | HEIGHT: 69.5 IN | TEMPERATURE: 98 F | RESPIRATION RATE: 16 BRPM | DIASTOLIC BLOOD PRESSURE: 94 MMHG

## 2024-05-16 DIAGNOSIS — Z89.429 ACQUIRED ABSENCE OF OTHER TOE(S), UNSPECIFIED SIDE: Chronic | ICD-10-CM

## 2024-05-16 DIAGNOSIS — Z94.0 KIDNEY TRANSPLANT STATUS: ICD-10-CM

## 2024-05-16 DIAGNOSIS — Z98.49 CATARACT EXTRACTION STATUS, UNSPECIFIED EYE: Chronic | ICD-10-CM

## 2024-05-16 DIAGNOSIS — Z98.890 OTHER SPECIFIED POSTPROCEDURAL STATES: Chronic | ICD-10-CM

## 2024-05-16 DIAGNOSIS — Z87.898 PERSONAL HISTORY OF OTHER SPECIFIED CONDITIONS: Chronic | ICD-10-CM

## 2024-05-16 DIAGNOSIS — Z98.84 BARIATRIC SURGERY STATUS: Chronic | ICD-10-CM

## 2024-05-16 LAB
APPEARANCE: CLEAR
BACTERIA: NEGATIVE /HPF
BILIRUBIN URINE: NEGATIVE
BKV DNA SPEC QL NAA+PROBE: NOT DETECTED IU/ML
BLOOD URINE: NEGATIVE
CAST: 0 /LPF
COLOR: YELLOW
EPITHELIAL CELLS: 1 /HPF
GLUCOSE BLDC GLUCOMTR-MCNC: 122 MG/DL — HIGH (ref 70–99)
GLUCOSE QUALITATIVE U: NEGATIVE MG/DL
KETONES URINE: NEGATIVE MG/DL
LEUKOCYTE ESTERASE URINE: ABNORMAL
MICROSCOPIC-UA: NORMAL
NITRITE URINE: NEGATIVE
PH URINE: 6
PROTEIN URINE: NORMAL MG/DL
RED BLOOD CELLS URINE: 1 /HPF
SPECIFIC GRAVITY URINE: 1.02
UROBILINOGEN URINE: 0.2 MG/DL
WHITE BLOOD CELLS URINE: 8 /HPF

## 2024-05-16 PROCEDURE — 52310 CYSTOSCOPY AND TREATMENT: CPT

## 2024-05-16 PROCEDURE — 52315 CYSTOSCOPY AND TREATMENT: CPT | Mod: 58

## 2024-05-16 PROCEDURE — C1747: CPT

## 2024-05-16 PROCEDURE — 82962 GLUCOSE BLOOD TEST: CPT

## 2024-05-16 DEVICE — ASCOPE 4 CYSTOSCOPE FLEX 6X390MM
Type: IMPLANTABLE DEVICE | Site: RIGHT | Status: NON-FUNCTIONAL
Removed: 2024-05-16

## 2024-05-16 RX ORDER — ALBUTEROL 90 UG/1
2 AEROSOL, METERED ORAL
Qty: 0 | Refills: 0 | DISCHARGE

## 2024-05-16 RX ORDER — LEVOTHYROXINE SODIUM 125 MCG
1 TABLET ORAL
Qty: 0 | Refills: 0 | DISCHARGE

## 2024-05-16 RX ORDER — SODIUM CHLORIDE 9 MG/ML
3 INJECTION INTRAMUSCULAR; INTRAVENOUS; SUBCUTANEOUS EVERY 8 HOURS
Refills: 0 | Status: DISCONTINUED | OUTPATIENT
Start: 2024-05-16 | End: 2024-05-30

## 2024-05-16 RX ADMIN — SODIUM CHLORIDE 3 MILLILITER(S): 9 INJECTION INTRAMUSCULAR; INTRAVENOUS; SUBCUTANEOUS at 08:05

## 2024-05-16 NOTE — H&P PST ADULT - ATTENDING COMMENTS
68M s/p LDRT 3/25/24 with excellent renal function for Cystoscopy with removal of ureteral stent from right transplant kidney.  Details of procedure discussed, including risks of hematuria, dysuria, SALEEM, UTI

## 2024-05-16 NOTE — ASU PATIENT PROFILE, ADULT - DOES PATIENT HAVE ADVANCE DIRECTIVE
[facility] of [place] [address]  Phone: [phone]  Fax: [phone]    Sasha Finney MD        September 14, 2021     Patient: Caterina Wood   YOB: 1975   Date of Visit: 9/14/2021       To Whom it May Concern:    Dylon Layne was seen in my clinic on 9/14/2021. He may return to work tomorrow 9/15/2021 without restrictions. If you have any questions or concerns, please don't hesitate to call.     Sincerely,         Sasha Finney MD
forms given with explanation & instructions in detail

## 2024-05-16 NOTE — H&P PST ADULT - CONSTITUTIONAL
----- Message from Tremontana Chevalier sent at 8/4/2022 10:08 AM CDT -----  Regarding: appt  Contact: pt @ [phone]   Pt wanting to schedule appt with Dr. MICHELE Waddell for pain in right hand, fingers are stiff in right hand, left wrist is getting worse, need check up. Pt says she has seen Dr Idalmis Waddell in 2019 @ [facility]. Pt has Medicaid, I advised pt no new Medicaid. Pt insist that I send a msg to Dr. Waddell because she would remember her from performing hand surgery and would want to see her.     Pt says the she is trying to get her med records from [facility], but Dr. Waddell's office would have to req them. [facility] [phone]. Pls call pt @ [phone].       no distress No

## 2024-05-16 NOTE — H&P PST ADULT - HISTORY OF PRESENT ILLNESS
68M s/p LDRT 3/25/24 with excellent renal function for Cystoscopy with removal of ureteral stent from right transplant kidney.  Patient denies any fevers, abdominal pain, nausea, diarrhea, hematuria or dysuria.

## 2024-05-16 NOTE — H&P PST ADULT - BIRTH SEX
Patient came into clinic to request refill on his trulicity. He said it kept getting sent to his old office, so he thinks this may be the first time Dr. Freeman will be filling it but says he should be aware of the situation. He would like it sent to the [facility] [address].   Male

## 2024-05-16 NOTE — BRIEF OPERATIVE NOTE - OPERATION/FINDINGS
normal bladder mucosa  heterotopic placement of ureter anastomosis in anterior and retrograde position, making localization and removal difficult

## 2024-05-16 NOTE — ASU DISCHARGE PLAN (ADULT/PEDIATRIC) - NURSING INSTRUCTIONS
You can take Tylenol (Acetaminophen) every 6 hours, as needed. Do not exceed 4000mg of Tylenol (Acetaminophen) daily. You can take Tylenol whenever you get home, if needed for pain and no contraindications.

## 2024-05-16 NOTE — ASU DISCHARGE PLAN (ADULT/PEDIATRIC) - CALL YOUR DOCTOR IF YOU HAVE ANY OF THE FOLLOWING:
In order to meet Medicare requirements, the clinical documentation must support the information cited in the admission order.  Please be sure to provide detailed and clear documentation about the following in the admitting note/history and physical: Unable to urinate

## 2024-05-17 ENCOUNTER — NON-APPOINTMENT (OUTPATIENT)
Age: 69
End: 2024-05-17

## 2024-06-05 ENCOUNTER — APPOINTMENT (OUTPATIENT)
Dept: NEPHROLOGY | Facility: CLINIC | Age: 69
End: 2024-06-05
Payer: MEDICARE

## 2024-06-05 VITALS
RESPIRATION RATE: 16 BRPM | SYSTOLIC BLOOD PRESSURE: 181 MMHG | DIASTOLIC BLOOD PRESSURE: 90 MMHG | BODY MASS INDEX: 28.99 KG/M2 | HEART RATE: 68 BPM | HEIGHT: 72 IN | TEMPERATURE: 97.3 F | WEIGHT: 214 LBS | OXYGEN SATURATION: 99 %

## 2024-06-05 LAB
ALBUMIN SERPL ELPH-MCNC: 4.3 G/DL
ALP BLD-CCNC: 94 U/L
ALT SERPL-CCNC: 21 U/L
ANION GAP SERPL CALC-SCNC: 10 MMOL/L
APPEARANCE: CLEAR
AST SERPL-CCNC: 20 U/L
BACTERIA: NEGATIVE /HPF
BILIRUB SERPL-MCNC: 0.4 MG/DL
BILIRUBIN URINE: NEGATIVE
BLOOD URINE: NEGATIVE
BUN SERPL-MCNC: 13 MG/DL
CALCIUM SERPL-MCNC: 9.8 MG/DL
CALCIUM SERPL-MCNC: 9.8 MG/DL
CAST: 0 /LPF
CHLORIDE SERPL-SCNC: 101 MMOL/L
CO2 SERPL-SCNC: 26 MMOL/L
COLOR: YELLOW
CREAT SERPL-MCNC: 1 MG/DL
CREAT SPEC-SCNC: 122 MG/DL
CREAT/PROT UR: 0.2 RATIO
EGFR: 83 ML/MIN/1.73M2
EPITHELIAL CELLS: 1 /HPF
GLUCOSE QUALITATIVE U: 100 MG/DL
GLUCOSE SERPL-MCNC: 166 MG/DL
HCT VFR BLD CALC: 32.8 %
HGB BLD-MCNC: 11.5 G/DL
KETONES URINE: NEGATIVE MG/DL
LEUKOCYTE ESTERASE URINE: ABNORMAL
MAGNESIUM SERPL-MCNC: 1.9 MG/DL
MCHC RBC-ENTMCNC: 35.1 GM/DL
MCHC RBC-ENTMCNC: 35.2 PG
MCV RBC AUTO: 100.3 FL
MICROSCOPIC-UA: NORMAL
NITRITE URINE: NEGATIVE
PARATHYROID HORMONE INTACT: 44 PG/ML
PH URINE: 6
PHOSPHATE SERPL-MCNC: 3 MG/DL
PLATELET # BLD AUTO: 124 K/UL
POTASSIUM SERPL-SCNC: 4.6 MMOL/L
PROT SERPL-MCNC: 6.7 G/DL
PROT UR-MCNC: 20 MG/DL
PROTEIN URINE: NORMAL MG/DL
RBC # BLD: 3.27 M/UL
RBC # FLD: 13.5 %
RED BLOOD CELLS URINE: 1 /HPF
SODIUM SERPL-SCNC: 137 MMOL/L
SPECIFIC GRAVITY URINE: 1.02
TACROLIMUS SERPL-MCNC: 12.1 NG/ML
URATE SERPL-MCNC: 5.8 MG/DL
UROBILINOGEN URINE: 0.2 MG/DL
WBC # FLD AUTO: 6.04 K/UL
WHITE BLOOD CELLS URINE: 11 /HPF

## 2024-06-05 PROCEDURE — 99215 OFFICE O/P EST HI 40 MIN: CPT

## 2024-06-05 RX ORDER — TACROLIMUS 1 MG/1
1 TABLET, EXTENDED RELEASE ORAL
Qty: 90 | Refills: 5 | Status: DISCONTINUED | COMMUNITY
Start: 2024-05-15 | End: 2024-06-05

## 2024-06-05 RX ORDER — TACROLIMUS 0.75 MG/1
0.75 TABLET, EXTENDED RELEASE ORAL DAILY
Qty: 60 | Refills: 5 | Status: DISCONTINUED | COMMUNITY
Start: 2024-05-15 | End: 2024-06-05

## 2024-06-05 RX ORDER — TACROLIMUS 4 MG/1
4 TABLET, EXTENDED RELEASE ORAL DAILY
Qty: 120 | Refills: 11 | Status: ACTIVE | COMMUNITY
Start: 2024-06-05 | End: 1900-01-01

## 2024-06-05 RX ORDER — TRAMADOL HYDROCHLORIDE 50 MG/1
50 TABLET, COATED ORAL
Qty: 14 | Refills: 0 | Status: DISCONTINUED | COMMUNITY
Start: 2024-04-03 | End: 2024-06-05

## 2024-06-05 NOTE — PHYSICAL EXAM
[General Appearance - Alert] : alert [General Appearance - In No Acute Distress] : in no acute distress [General Appearance - Well-Appearing] : healthy appearing [Sclera] : the sclera and conjunctiva were normal [Oropharynx] : the oropharynx was normal [Neck Appearance] : the appearance of the neck was normal [Respiration, Rhythm And Depth] : normal respiratory rhythm and effort [Exaggerated Use Of Accessory Muscles For Inspiration] : no accessory muscle use [Auscultation Breath Sounds / Voice Sounds] : lungs were clear to auscultation bilaterally [Heart Rate And Rhythm] : heart rate was normal and rhythm regular [Heart Sounds Gallop] : no gallops [Heart Sounds] : normal S1 and S2 [Edema] : there was no peripheral edema [Bowel Sounds] : normal bowel sounds [Abdomen Soft] : soft [Abdomen Tenderness] : non-tender [___ (cm) Fistula] : [unfilled] (cm) fistula [Bruit] : a bruit was present [Thrill] : a thrill was present [] : no rash [No Focal Deficits] : no focal deficits [Oriented To Time, Place, And Person] : oriented to person, place, and time [FreeTextEntry1] : RLQ incision well healed. No graft tenderness. + bruit

## 2024-06-05 NOTE — ASSESSMENT
[FreeTextEntry1] : 68-year-old man with ESRD on HD since 6/2022, received LURD kidney transplant from a 32-year-old altruistic donor on 3/25/24 PMH: CKD and proteinuria related to obesity (?FSGS) dx in 1985, Biopsy in 1989, Obesity lap band in 2010 with 150lbs weight loss, deflated since 2019, DM dx 1992 never on insulin, HLD, Hypothyroidism, Portal HTN and liver fibrosis likely due to metabolic d/o associated steatotic liver disease, PVD s/p left toe amputation 2019, KRYSTIAN improved after weight loss, h/o pericardial effusion s/p pericardiocentesis 11/2022, left forearm AV fistula.  S/p LURD transplant 3/25/24 Cr 0.9mg/dL on last visit, pending today. No proteinuria UPCR 0.2 down from 5.6 grams pre transplant  Immunosuppression Simulect induction Envarsus 4.5mg daily. Aim for trough 8-10 Continue MMF 1-gram po bid Prednisone 5mg daily  Infection prophylaxis Bactrim SS daily Acyclovir 400mg po bid for 3 months (CMV low risk) - complete at the end of this month  INH/B6 x 9 months for donor with latent TB  Anemia - Multifactorial CKD, medications, recent surgery. Hgb continues to improve 11.2 on last visit  Diabetes II. Continue Prandin 1mg po TID with meals, low concentrated sweet diet, exercise as tolerated.   HTN and h/o arrythmia - previously on metoprolol low dose and midodrine for intradialytic hypotension On Metoprolol 25mg po bid. BP elevated today, has not been checking at home.  He will check BP at home and keep log, Will start amlodipine 5mg po qhs if BP still high. BP goal < 130/80   Hypothyroidism - continue Synthroid 125mcg po daily  Hypomagnesemia - mg oxide 400gm po bid  F/u 6/26/24

## 2024-06-05 NOTE — HISTORY OF PRESENT ILLNESS
[FreeTextEntry1] : 68-year-old man with ESRD on HD since 6/2022, received LURD kidney transplant from a 34-year-old altruistic donor on 3/25/24  PMH: CKD and proteinuria related to obesity (?FSGS) dx in 1985, Biopsy in 1989,Obesity lap band in 2010 with 150lbs weight loss, deflated since 2019, DM dx 1992 never on insulin, HLD, Hypothyroidism, Portal HTN and liver fibrosis likely duet o metabolic d/o associated steatotic liver disease, PVD s/p left toe amputation 2019, KRYSTIAN improved after weight loss, h/o pericardial effusion s/p pericardiocentesis 11/2022, left forearm AV fistula. Strong family history of kidney disease, Mom had CKD, Sister had a kidney transplant.  Lives with wife. Retired in 2021  CMV D-/R-, EBV D+/R+, HLA mismatch 2, 2, 2. No DSA. Pt had asymptomatic COVID +ve infection on pre op testing, repeat negative after admission. Donor has latent TB, recipient started on INH/B6 Did well post op. He was discharged on post op day 4 with a serum creatinine of 1.1mg/dL.   Presents for follow up visit.  Transplant ureteric stent remove don 5/16/24.  He feels well. Voiding urine without difficulty. No dysuria, no hematuria, nocturia several times, stream is good.  No nausea or vomiting. No diarrhea. Appetite is good. Not eating healthy. Weight up 10lbs.  No fever, chills.  No chest pain or shortness of breath. Taking all medications as prescribed  Has not been checking BP at home.  Blood glucose - hast not been checking  Weight 24lbs 10lbs up since last visit. Has ankle swelling that is worse at the end of the day.  No longer ambulating with a walker, outpatient PT . Driving.

## 2024-06-06 LAB — BKV DNA SPEC QL NAA+PROBE: NOT DETECTED IU/ML

## 2024-06-17 ENCOUNTER — LABORATORY RESULT (OUTPATIENT)
Age: 69
End: 2024-06-17

## 2024-06-17 ENCOUNTER — APPOINTMENT (OUTPATIENT)
Dept: HEPATOLOGY | Facility: CLINIC | Age: 69
End: 2024-06-17
Payer: MEDICARE

## 2024-06-17 VITALS
BODY MASS INDEX: 28.71 KG/M2 | SYSTOLIC BLOOD PRESSURE: 145 MMHG | OXYGEN SATURATION: 98 % | TEMPERATURE: 97.7 F | HEART RATE: 76 BPM | DIASTOLIC BLOOD PRESSURE: 79 MMHG | WEIGHT: 212 LBS | HEIGHT: 72 IN

## 2024-06-17 DIAGNOSIS — K74.60 UNSPECIFIED CIRRHOSIS OF LIVER: ICD-10-CM

## 2024-06-17 DIAGNOSIS — I10 ESSENTIAL (PRIMARY) HYPERTENSION: ICD-10-CM

## 2024-06-17 DIAGNOSIS — E83.110 HEREDITARY HEMOCHROMATOSIS: ICD-10-CM

## 2024-06-17 DIAGNOSIS — E11.9 TYPE 2 DIABETES MELLITUS W/OUT COMPLICATIONS: ICD-10-CM

## 2024-06-17 DIAGNOSIS — R79.0 ABNORMAL LVL OF BLOOD MINERAL: ICD-10-CM

## 2024-06-17 PROCEDURE — 99214 OFFICE O/P EST MOD 30 MIN: CPT

## 2024-06-17 NOTE — ASSESSMENT
[FreeTextEntry1] : Mr. 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  TJLB with CSG recorded as 8mmHg - consistent with portal hypertension but not clinically significant portal hypertension. Liver biopsy path consistent with cirrhosis and areas consistent with regenerative nodule. Etiology presumed MASH. Also found to be compound heterozygote for hemochromatosis (H63D) gene.   Doing well.  Compensated MASh cirrhosis. MELD low  No ascites on exam No HE No HCC on imaging from March 2024. Will continue q 6 month HCC screening. Next in September. No prior history of EVB. He is fresh post renal transplant. Given his low gradient of 8, will hold off on EGD for variceal screening. Will obtain Liver Fibroscan in the spring of 2025 and consider EGD for variceal screening if he meets Baveno criteria. We discussed the importance of modifying his risk factors of progressive disease - weight, BP, DM. He is working on it. We discussed compensated vs. Decompensated liver cirrhosis.   Will continue q 6 month follow up.

## 2024-06-17 NOTE — PHYSICAL EXAM
[Scleral Icterus] : No Scleral Icterus [Spider Angioma] : No spider angioma(s) were observed [Abdominal  Ascites] : no ascites [Asterixis] : no asterixis observed [Jaundice] : No jaundice [General Appearance - Alert] : alert [General Appearance - In No Acute Distress] : in no acute distress [General Appearance - Well Nourished] : well nourished [Sclera] : the sclera and conjunctiva were normal [Auscultation Breath Sounds / Voice Sounds] : lungs were clear to auscultation bilaterally [Heart Rate And Rhythm] : heart rate was normal and rhythm regular [Heart Sounds] : normal S1 and S2 [Bowel Sounds] : normal bowel sounds [Abdomen Soft] : soft [Abdomen Tenderness] : non-tender [FreeTextEntry1] : Post surgical scar [Abnormal Walk] : normal gait [Nail Clubbing] : no clubbing  or cyanosis of the fingernails [Musculoskeletal - Swelling] : no joint swelling seen [Skin Color & Pigmentation] : normal skin color and pigmentation [Skin Turgor] : normal skin turgor [] : no rash [Oriented To Time, Place, And Person] : oriented to person, place, and time [Affect] : the affect was normal

## 2024-06-17 NOTE — HISTORY OF PRESENT ILLNESS
[FreeTextEntry1] : Mr. 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  I saw him for his first visit in March, 2024 prior to his planned living donor renal transplant. He was noted to have a cirrhotic appearing liver on CT abdomen from late last year plus ascites which had since resolved. I was asked to see him for pre renal transplant clearance.   He underwent a TJLB with CSG recorded as 8mmHg - consistent with portal hypertension but not clinically significant portal hypertension. Liver biopsy path consistent with cirrhosis and areas consistent with regenerative nodule. Etiology presumed MASH. He also was found to be compound heterozygote for hemochromatosis (H63D) gene.   He proceeded with renal transplant and has done well since then. He is well and denies any complaints. He is almost back to his baseline health and feels very relieved that he does not have to go for HD anymore. Denies any melena, abdominal pain, jaundice, abdominal swelling, lower extremity swelling, memory or confusion.  Current Meds: Envarzuz by level, Cellcept,1000 BID, Prednisone 5 daily, Bactrim 400/80, Acyclovir 400mg daily, Isoniazid 300mg daily, Vit B6 50mg, Metoprolol 25mg daily, Prandin 1mg daily, Levothyroxine 125mcg daily, Pepcid, Senna as needed   Denies any family history of liver disease. No alcohol. Quit smoking >30 years ago. Had lap band surgery in 2010 and lost about 150lbs afterwards and has mostly maintained his weight.       Path march, 2024 Final Diagnosis 1. Liver, transjugular biopsy: - Cirrhosis - Minimal steatosis - Iron stain with increased iron stores in Kupffer cells and  hepatocytes (1+ out of 4+) - See note  Note:  The liver biopsy is adequate for evaluation and demonstrates several cores of liver tissue with disrupted architecture characterized predominantly by nodules of regenerating hepatocytes encircled by variably thick fibrous bands. The parenchyma shows minimal mixed large and small droplet macrovesicular steatosis involving less than 5% of the sampled parenchymal area. No ballooned hepatocytes or Luna denk inclusions are seen. Iron deposits are identified on H T E which are also highlighted by iron stain (1+ out of 4+ hepatocellular and mild/moderate Kupffer cell siderosis, Scheuer grading scheme). The portal tracts show mild patchy inflammation with scattered mature lymphocytes and occasional plasma cells. Where seen, the interlobular bile ducts show focal reactive changes, characterized by slight irregular nuclear spacing in few profiles. Mild ductular reaction is focally present. Lobular inflammation is minimal. There is no granuloma, ductcentric fibroobliterative lesion or cholestasis. Trichrome and reticulin stains highlight the nodularity of cirrhosis. At one end of the longest core, portal fibrosis is focally mild without apparent connection to an adjacent central vein or portal tract. Additionally, few areas of pericellular fibrosis are also seen. PAS with diastase (PAS/D) stain is negative for diagnostic intracytoplasmic inclusions and highlights occasional scattered macrophages.  The overall findings are in keeping with cirrhosis. A focal area is identified where cirrhotic features are less pronounced which may either represent the edge of a regenerative nodule or suggest potential heterogeneity in fibrosis. In the context of cardiometabolic risk factors (e.g., BMI  > 25 kg/m2, hypertension, type II diabetes mellitus), the histologic presence of steatosis and foci of pericellular fibrosis raise consideration for metabolic dysfunction-associated steatotic liver disease (MASLD) as a potential underlying etiology. History of end stage renal disease is noted; while the mixed pattern of iron deposition may be attributed to secondary causes of siderosis (e.g., hemodialysis), a possible component of mild iron storage from the patient's known reported HFE H63D carrier status cannot be excluded.

## 2024-06-17 NOTE — END OF VISIT
[FreeTextEntry3] :  I, Dr. Evangelista, personally performed the evaluation and management (E/M) services for this established patient who presents today with (a) new problem(s)/exacerbation of (an) existing condition(s). That E/M includes conducting the clinically appropriate interval history &/or exam, assessing all new/exacerbated conditions, and establishing a new plan of care. Today, my DONNA, PF Changs, was here to observe my evaluation and management service for this new problem/exacerbated condition and follow the plan of care established by me going forward

## 2024-06-18 ENCOUNTER — NON-APPOINTMENT (OUTPATIENT)
Age: 69
End: 2024-06-18

## 2024-06-18 LAB
ALBUMIN SERPL ELPH-MCNC: 4.7 G/DL
ALP BLD-CCNC: 92 U/L
ALT SERPL-CCNC: 20 U/L
ANION GAP SERPL CALC-SCNC: 13 MMOL/L
APPEARANCE: CLEAR
AST SERPL-CCNC: 24 U/L
BACTERIA: NEGATIVE /HPF
BASOPHILS # BLD AUTO: 0 K/UL
BASOPHILS NFR BLD AUTO: 0 %
BILIRUB SERPL-MCNC: 0.4 MG/DL
BILIRUBIN URINE: NEGATIVE
BLOOD URINE: NEGATIVE
BUN SERPL-MCNC: 13 MG/DL
CALCIUM SERPL-MCNC: 10.4 MG/DL
CAST: 0 /LPF
CHLORIDE SERPL-SCNC: 98 MMOL/L
CHOLEST SERPL-MCNC: 200 MG/DL
CMV DNA SPEC QL NAA+PROBE: NOT DETECTED IU/ML
CMVPCR LOG: NOT DETECTED LOG10IU/ML
CO2 SERPL-SCNC: 26 MMOL/L
COLOR: NORMAL
CREAT SERPL-MCNC: 0.95 MG/DL
CREAT SPEC-SCNC: 113 MG/DL
CREAT/PROT UR: 0.1 RATIO
EGFR: 88 ML/MIN/1.73M2
EOSINOPHIL # BLD AUTO: 0.08 K/UL
EOSINOPHIL NFR BLD AUTO: 0.9 %
EPITHELIAL CELLS: 1 /HPF
ESTIMATED AVERAGE GLUCOSE: 183 MG/DL
GLUCOSE QUALITATIVE U: NEGATIVE MG/DL
GLUCOSE SERPL-MCNC: 163 MG/DL
HBA1C MFR BLD HPLC: 8 %
HCT VFR BLD CALC: 37.5 %
HDLC SERPL-MCNC: 52 MG/DL
HGB BLD-MCNC: 12.5 G/DL
INR PPP: 0.93 RATIO
KETONES URINE: NEGATIVE MG/DL
LDLC SERPL CALC-MCNC: 123 MG/DL
LEUKOCYTE ESTERASE URINE: ABNORMAL
LYMPHOCYTES # BLD AUTO: 1.33 K/UL
LYMPHOCYTES NFR BLD AUTO: 14.8 %
MAGNESIUM SERPL-MCNC: 2 MG/DL
MAN DIFF?: NORMAL
MCHC RBC-ENTMCNC: 33.3 GM/DL
MCHC RBC-ENTMCNC: 34.9 PG
MCV RBC AUTO: 104.7 FL
MICROSCOPIC-UA: NORMAL
MONOCYTES # BLD AUTO: 0.15 K/UL
MONOCYTES NFR BLD AUTO: 1.7 %
NEUTROPHILS # BLD AUTO: 7.29 K/UL
NEUTROPHILS NFR BLD AUTO: 80.8 %
NITRITE URINE: NEGATIVE
NONHDLC SERPL-MCNC: 148 MG/DL
PH URINE: 6
PHOSPHATE SERPL-MCNC: 3.1 MG/DL
PLATELET # BLD AUTO: 155 K/UL
POTASSIUM SERPL-SCNC: 4.9 MMOL/L
PROT SERPL-MCNC: 7.2 G/DL
PROT UR-MCNC: 15 MG/DL
PROTEIN URINE: NORMAL MG/DL
PT BLD: 10.5 SEC
RBC # BLD: 3.58 M/UL
RBC # FLD: 13.8 %
RED BLOOD CELLS URINE: 1 /HPF
SODIUM SERPL-SCNC: 137 MMOL/L
SPECIFIC GRAVITY URINE: 1.02
TACROLIMUS SERPL-MCNC: 7.7 NG/ML
TRIGL SERPL-MCNC: 138 MG/DL
URATE SERPL-MCNC: 5.7 MG/DL
UROBILINOGEN URINE: 0.2 MG/DL
WBC # FLD AUTO: 9.02 K/UL
WHITE BLOOD CELLS URINE: 2 /HPF

## 2024-06-19 LAB — BKV DNA SPEC QL NAA+PROBE: NOT DETECTED IU/ML

## 2024-06-20 ENCOUNTER — APPOINTMENT (OUTPATIENT)
Dept: NEPHROLOGY | Facility: CLINIC | Age: 69
End: 2024-06-20

## 2024-06-24 DIAGNOSIS — Z94.0 KIDNEY TRANSPLANT STATUS: ICD-10-CM

## 2024-06-24 LAB
ALPHA-1-FETOPROTEIN-L3: NORMAL %
ALPHA-1-FETOPROTEIN: 1 NG/ML

## 2024-06-26 ENCOUNTER — LABORATORY RESULT (OUTPATIENT)
Age: 69
End: 2024-06-26

## 2024-06-26 ENCOUNTER — NON-APPOINTMENT (OUTPATIENT)
Age: 69
End: 2024-06-26

## 2024-06-26 ENCOUNTER — APPOINTMENT (OUTPATIENT)
Dept: NEPHROLOGY | Facility: CLINIC | Age: 69
End: 2024-06-26
Payer: MEDICARE

## 2024-06-26 PROCEDURE — 99215 OFFICE O/P EST HI 40 MIN: CPT

## 2024-06-26 RX ORDER — ROSUVASTATIN CALCIUM 5 MG/1
5 TABLET, FILM COATED ORAL DAILY
Qty: 90 | Refills: 3 | Status: ACTIVE | COMMUNITY
Start: 2024-06-26 | End: 1900-01-01

## 2024-06-26 RX ORDER — AMLODIPINE BESYLATE 5 MG/1
5 TABLET ORAL DAILY
Qty: 90 | Refills: 3 | Status: ACTIVE | COMMUNITY
Start: 2024-06-26 | End: 1900-01-01

## 2024-06-26 RX ORDER — LEVOTHYROXINE SODIUM 0.12 MG/1
125 TABLET ORAL
Qty: 30 | Refills: 0 | Status: DISCONTINUED | COMMUNITY
Start: 2024-03-27 | End: 2024-06-26

## 2024-06-27 LAB
25(OH)D3 SERPL-MCNC: 56.8 NG/ML
ALBUMIN SERPL ELPH-MCNC: 4.5 G/DL
ALP BLD-CCNC: 90 U/L
ALT SERPL-CCNC: 23 U/L
ANION GAP SERPL CALC-SCNC: 12 MMOL/L
APPEARANCE: CLEAR
AST SERPL-CCNC: 26 U/L
BACTERIA: NEGATIVE /HPF
BASOPHILS # BLD AUTO: 0 K/UL
BASOPHILS NFR BLD AUTO: 0 %
BILIRUB SERPL-MCNC: 0.5 MG/DL
BILIRUBIN URINE: NEGATIVE
BLOOD URINE: NEGATIVE
BUN SERPL-MCNC: 14 MG/DL
CALCIUM SERPL-MCNC: 9.8 MG/DL
CALCIUM SERPL-MCNC: 9.8 MG/DL
CAST: 0 /LPF
CHLORIDE SERPL-SCNC: 101 MMOL/L
CHOLEST SERPL-MCNC: 192 MG/DL
CMV DNA SPEC QL NAA+PROBE: NOT DETECTED IU/ML
CMVPCR LOG: NOT DETECTED LOG10IU/ML
CO2 SERPL-SCNC: 25 MMOL/L
COLOR: NORMAL
CREAT SERPL-MCNC: 0.94 MG/DL
CREAT SPEC-SCNC: 202 MG/DL
CREAT/PROT UR: 0.2 RATIO
EGFR: 89 ML/MIN/1.73M2
EOSINOPHIL # BLD AUTO: 0.07 K/UL
EOSINOPHIL NFR BLD AUTO: 0.9 %
EPITHELIAL CELLS: 1 /HPF
ESTIMATED AVERAGE GLUCOSE: 189 MG/DL
GLUCOSE QUALITATIVE U: 500 MG/DL
GLUCOSE SERPL-MCNC: 166 MG/DL
HBA1C MFR BLD HPLC: 8.2 %
HCT VFR BLD CALC: 34.7 %
HDLC SERPL-MCNC: 54 MG/DL
HGB BLD-MCNC: 12.3 G/DL
KETONES URINE: NEGATIVE MG/DL
LDH SERPL-CCNC: 263 U/L
LDLC SERPL CALC-MCNC: 119 MG/DL
LEUKOCYTE ESTERASE URINE: ABNORMAL
LYMPHOCYTES # BLD AUTO: 0.67 K/UL
LYMPHOCYTES NFR BLD AUTO: 8.8 %
MAGNESIUM SERPL-MCNC: 1.8 MG/DL
MAN DIFF?: NORMAL
MCHC RBC-ENTMCNC: 35.3 PG
MCHC RBC-ENTMCNC: 35.4 GM/DL
MCV RBC AUTO: 99.7 FL
MICROSCOPIC-UA: NORMAL
MONOCYTES # BLD AUTO: 0.6 K/UL
MONOCYTES NFR BLD AUTO: 7.9 %
NEUTROPHILS # BLD AUTO: 6.09 K/UL
NEUTROPHILS NFR BLD AUTO: 79.8 %
NITRITE URINE: NEGATIVE
NONHDLC SERPL-MCNC: 137 MG/DL
PARATHYROID HORMONE INTACT: 48 PG/ML
PH URINE: 6
PHOSPHATE SERPL-MCNC: 2.7 MG/DL
PLATELET # BLD AUTO: 130 K/UL
POTASSIUM SERPL-SCNC: 4.1 MMOL/L
PROT SERPL-MCNC: 6.8 G/DL
PROT UR-MCNC: 32 MG/DL
PROTEIN URINE: 30 MG/DL
RBC # BLD: 3.48 M/UL
RBC # FLD: 13.6 %
RED BLOOD CELLS URINE: 2 /HPF
SODIUM SERPL-SCNC: 138 MMOL/L
SPECIFIC GRAVITY URINE: 1.03
TACROLIMUS SERPL-MCNC: 8 NG/ML
TRIGL SERPL-MCNC: 103 MG/DL
URATE SERPL-MCNC: 5.5 MG/DL
UROBILINOGEN URINE: 1 MG/DL
WBC # FLD AUTO: 7.63 K/UL
WHITE BLOOD CELLS URINE: 6 /HPF

## 2024-07-01 LAB — BKV DNA SPEC QL NAA+PROBE: NOT DETECTED IU/ML

## 2024-07-08 ENCOUNTER — RX RENEWAL (OUTPATIENT)
Age: 69
End: 2024-07-08

## 2024-07-10 ENCOUNTER — LABORATORY RESULT (OUTPATIENT)
Age: 69
End: 2024-07-10

## 2024-07-10 ENCOUNTER — APPOINTMENT (OUTPATIENT)
Dept: NEPHROLOGY | Facility: CLINIC | Age: 69
End: 2024-07-10
Payer: MEDICARE

## 2024-07-10 VITALS
HEIGHT: 72 IN | SYSTOLIC BLOOD PRESSURE: 164 MMHG | WEIGHT: 202 LBS | OXYGEN SATURATION: 95 % | DIASTOLIC BLOOD PRESSURE: 81 MMHG | BODY MASS INDEX: 27.36 KG/M2 | HEART RATE: 119 BPM

## 2024-07-10 PROCEDURE — 99215 OFFICE O/P EST HI 40 MIN: CPT

## 2024-07-10 RX ORDER — SEMAGLUTIDE 1.34 MG/ML
2 INJECTION, SOLUTION SUBCUTANEOUS
Refills: 0 | Status: ACTIVE | COMMUNITY

## 2024-07-10 RX ORDER — GLIPIZIDE 10 MG/1
10 TABLET ORAL
Refills: 0 | Status: ACTIVE | COMMUNITY

## 2024-07-11 ENCOUNTER — NON-APPOINTMENT (OUTPATIENT)
Age: 69
End: 2024-07-11

## 2024-07-11 LAB
ALP BLD-CCNC: 89 U/L
ALT SERPL-CCNC: 25 U/L
ANION GAP SERPL CALC-SCNC: 13 MMOL/L
APPEARANCE: CLEAR
AST SERPL-CCNC: 23 U/L
BACTERIA: NEGATIVE /HPF
BASOPHILS # BLD AUTO: 0.06 K/UL
BASOPHILS NFR BLD AUTO: 0.9 %
BILIRUB SERPL-MCNC: 0.4 MG/DL
BILIRUBIN URINE: ABNORMAL
BKV DNA SPEC QL NAA+PROBE: NOT DETECTED IU/ML
BLOOD URINE: NEGATIVE
BUN SERPL-MCNC: 18 MG/DL
CALCIUM SERPL-MCNC: 9.4 MG/DL
CAST: 0 /LPF
CHLORIDE SERPL-SCNC: 103 MMOL/L
CMV DNA SPEC QL NAA+PROBE: NOT DETECTED IU/ML
CMVPCR LOG: NOT DETECTED LOG10IU/ML
CO2 SERPL-SCNC: 25 MMOL/L
COLOR: NORMAL
CREAT SERPL-MCNC: 1.07 MG/DL
CREAT SPEC-SCNC: 268 MG/DL
CREAT/PROT UR: 0.2 RATIO
EOSINOPHIL # BLD AUTO: 0.12 K/UL
EOSINOPHIL NFR BLD AUTO: 1.8 %
EPITHELIAL CELLS: 1 /HPF
GLUCOSE QUALITATIVE U: 500 MG/DL
GLUCOSE SERPL-MCNC: 177 MG/DL
HCT VFR BLD CALC: 36.7 %
HGB BLD-MCNC: 12.9 G/DL
LEUKOCYTE ESTERASE URINE: NEGATIVE
LYMPHOCYTES # BLD AUTO: 1 K/UL
LYMPHOCYTES NFR BLD AUTO: 15 %
MAGNESIUM SERPL-MCNC: 2.1 MG/DL
MAN DIFF?: NORMAL
MCHC RBC-ENTMCNC: 34.3 PG
MCHC RBC-ENTMCNC: 35.1 GM/DL
MICROSCOPIC-UA: NORMAL
MONOCYTES # BLD AUTO: 0.41 K/UL
MONOCYTES NFR BLD AUTO: 6.2 %
NEUTROPHILS NFR BLD AUTO: 75.2 %
NITRITE URINE: NEGATIVE
PH URINE: 5.5
PHOSPHATE SERPL-MCNC: 2.7 MG/DL
PLATELET # BLD AUTO: 141 K/UL
PROT SERPL-MCNC: 6.9 G/DL
PROT UR-MCNC: 46 MG/DL
PROTEIN URINE: 30 MG/DL
RBC # BLD: 3.76 M/UL
RBC # FLD: 13.6 %
RED BLOOD CELLS URINE: 2 /HPF
SODIUM SERPL-SCNC: 141 MMOL/L
SPECIFIC GRAVITY URINE: >1.03
TACROLIMUS SERPL-MCNC: 12.8 NG/ML
URATE SERPL-MCNC: 5.2 MG/DL
UROBILINOGEN URINE: 1 MG/DL
WBC # FLD AUTO: 6.65 K/UL
WHITE BLOOD CELLS URINE: 3 /HPF

## 2024-07-11 RX ORDER — TACROLIMUS 1 MG/1
1 TABLET, EXTENDED RELEASE ORAL
Qty: 90 | Refills: 11 | Status: ACTIVE | COMMUNITY
Start: 2024-07-11 | End: 1900-01-01

## 2024-07-23 DIAGNOSIS — Z94.0 KIDNEY TRANSPLANT STATUS: ICD-10-CM

## 2024-07-24 ENCOUNTER — LABORATORY RESULT (OUTPATIENT)
Age: 69
End: 2024-07-24

## 2024-07-24 ENCOUNTER — APPOINTMENT (OUTPATIENT)
Dept: NEPHROLOGY | Facility: CLINIC | Age: 69
End: 2024-07-24
Payer: MEDICARE

## 2024-07-24 VITALS
HEIGHT: 72 IN | RESPIRATION RATE: 16 BRPM | WEIGHT: 202 LBS | HEART RATE: 69 BPM | BODY MASS INDEX: 27.36 KG/M2 | OXYGEN SATURATION: 99 % | TEMPERATURE: 98.3 F | SYSTOLIC BLOOD PRESSURE: 153 MMHG | DIASTOLIC BLOOD PRESSURE: 96 MMHG

## 2024-07-24 PROCEDURE — 99215 OFFICE O/P EST HI 40 MIN: CPT

## 2024-07-25 ENCOUNTER — NON-APPOINTMENT (OUTPATIENT)
Age: 69
End: 2024-07-25

## 2024-07-25 LAB
ALBUMIN SERPL ELPH-MCNC: 4.8 G/DL
ALP BLD-CCNC: 85 U/L
ALT SERPL-CCNC: 28 U/L
ANION GAP SERPL CALC-SCNC: 10 MMOL/L
APPEARANCE: CLEAR
AST SERPL-CCNC: 26 U/L
BACTERIA: NEGATIVE /HPF
BASOPHILS # BLD AUTO: 0.06 K/UL
BASOPHILS NFR BLD AUTO: 0.9 %
BILIRUB SERPL-MCNC: 0.5 MG/DL
BILIRUBIN URINE: NEGATIVE
BKV DNA SPEC QL NAA+PROBE: NOT DETECTED IU/ML
BLOOD URINE: NEGATIVE
BUN SERPL-MCNC: 18 MG/DL
CALCIUM SERPL-MCNC: 10 MG/DL
CAST: 0 /LPF
CHLORIDE SERPL-SCNC: 100 MMOL/L
CMV DNA SPEC QL NAA+PROBE: NOT DETECTED IU/ML
CMVPCR LOG: NOT DETECTED LOG10IU/ML
CO2 SERPL-SCNC: 28 MMOL/L
COLOR: YELLOW
CREAT SERPL-MCNC: 1.03 MG/DL
CREAT SPEC-SCNC: 45 MG/DL
CREAT/PROT UR: 0.2 RATIO
EGFR: 80 ML/MIN/1.73M2
EOSINOPHIL # BLD AUTO: 0.23 K/UL
EOSINOPHIL NFR BLD AUTO: 3.5 %
EPITHELIAL CELLS: 0 /HPF
GLUCOSE QUALITATIVE U: 500 MG/DL
GLUCOSE SERPL-MCNC: 209 MG/DL
HCT VFR BLD CALC: 37.1 %
HGB BLD-MCNC: 12.8 G/DL
KETONES URINE: NEGATIVE MG/DL
LEUKOCYTE ESTERASE URINE: ABNORMAL
LYMPHOCYTES # BLD AUTO: 1.1 K/UL
LYMPHOCYTES NFR BLD AUTO: 16.6 %
MAGNESIUM SERPL-MCNC: 2 MG/DL
MAN DIFF?: NORMAL
MCHC RBC-ENTMCNC: 33.4 PG
MCHC RBC-ENTMCNC: 34.5 GM/DL
MCV RBC AUTO: 96.9 FL
MICROSCOPIC-UA: NORMAL
MONOCYTES # BLD AUTO: 0.06 K/UL
MONOCYTES NFR BLD AUTO: 0.9 %
NEUTROPHILS # BLD AUTO: 5.19 K/UL
NEUTROPHILS NFR BLD AUTO: 78.1 %
NITRITE URINE: NEGATIVE
PH URINE: 6
PHOSPHATE SERPL-MCNC: 3 MG/DL
PLATELET # BLD AUTO: 131 K/UL
POTASSIUM SERPL-SCNC: 4.7 MMOL/L
PROT SERPL-MCNC: 6.9 G/DL
PROT UR-MCNC: 7 MG/DL
PROTEIN URINE: NEGATIVE MG/DL
RBC # BLD: 3.83 M/UL
RBC # FLD: 13.1 %
RED BLOOD CELLS URINE: 0 /HPF
SODIUM SERPL-SCNC: 138 MMOL/L
SPECIFIC GRAVITY URINE: 1.01
TACROLIMUS SERPL-MCNC: 9.7 NG/ML
URATE SERPL-MCNC: 5.3 MG/DL
UROBILINOGEN URINE: 0.2 MG/DL
WBC # FLD AUTO: 6.65 K/UL
WHITE BLOOD CELLS URINE: 2 /HPF

## 2024-07-25 NOTE — HISTORY OF PRESENT ILLNESS
[FreeTextEntry1] : 68-year-old man with ESRD on HD since 6/2022, received LURD kidney transplant from a 32-year-old altruistic donor on 3/25/24  PMH: CKD and proteinuria related to obesity (?FSGS) dx in 1985, Biopsy in 1989,Obesity lap band in 2010 with 150lbs weight loss, deflated since 2019, DM dx 1992 never on insulin, HLD, Hypothyroidism, Portal HTN and liver fibrosis likely duet o metabolic d/o associated steatotic liver disease, PVD s/p left toe amputation 2019, KRYSTIAN improved after weight loss, h/o pericardial effusion s/p pericardiocentesis 11/2022, left forearm AV fistula. Strong family history of kidney disease, Mom had CKD, Sister had a kidney transplant.  Lives with wife. Retired in 2021  CMV D-/R-, EBV D+/R+, HLA mismatch 2, 2, 2. No DSA. Pt had asymptomatic COVID +ve infection on pre op testing, repeat negative after admission. Donor has latent TB, recipient started on INH/B6 Did well post op. He was discharged on post op day 4 with a serum creatinine of 1.1mg/dL.  Transplant ureteric stent removed on 5/16/24.   Presents for scheduled follow up. Last seen 2 weeks ago.  Ozempic increased to 0.5mg weekly. Blood glucose 200's. Weight 202lbs stable.  's systolic at home. He feels well. Voiding urine without difficulty. No dysuria, no hematuria, good stream.  No nausea or vomiting. No diarrhea. Appetite is good. No fever, chills.  No chest pain or shortness of breath. Taking all medications as prescribed.

## 2024-07-25 NOTE — ASSESSMENT
[FreeTextEntry1] :  68-year-old man with ESRD on HD since 6/2022, received LURD kidney transplant from a 32-year-old altruistic donor on 3/25/24 PMH: CKD and proteinuria related to obesity (?FSGS) dx in 1985, Biopsy in 1989, Obesity lap band in 2010 with 150lbs weight loss, deflated since 2019, DM dx 1992 never on insulin, HLD, Hypothyroidism, Portal HTN and liver fibrosis likely due to metabolic d/o associated steatotic liver disease, PVD s/p left toe amputation 2019, KRYSTIAN improved after weight loss, h/o pericardial effusion s/p pericardiocentesis 11/2022, left forearm AV fistula.  S/p LURD transplant 3/25/24 with excellent graft function cr ~ 1mg/dL and no proteinuria.   Immunosuppression Simulect induction Envarsus 3mg daily. Level 9.7 at goal. Aim for trough 8-10 Continue MMF 1 gram po bid Prednisone 5mg daily  Infection prophylaxis CMV low risk (D-R-), completed Acyclovir 400mg po bid for 3 months Continue Bactrim SS daily INH/B6 x 9 months for donor with latent TB  Diabetes II. Was sub optimal on Prandin alone with  A1c 8.2%. Prandin discontinued and now on Glipizide 10 and Ozempic 0.5 by endocrinology. Adhering to diabetic diet, physically more active.    HTN and h/o arrythmia - pre transplant he was on low dose metoprolol and midodrine for intradialytic hypotension On Metoprolol 25mg po bid and Amlodipine 5mg daily. BP well controlled at home.   Hypothyroidism - continue Synthroid 125mcg po daily  Hypomagnesemia - mg oxide 400gm po bid  F/u scheduled 8/14/24

## 2024-07-25 NOTE — PHYSICAL EXAM
[General Appearance - Alert] : alert [General Appearance - In No Acute Distress] : in no acute distress [General Appearance - Well-Appearing] : healthy appearing [Sclera] : the sclera and conjunctiva were normal [Oropharynx] : the oropharynx was normal [Neck Appearance] : the appearance of the neck was normal [Respiration, Rhythm And Depth] : normal respiratory rhythm and effort [Exaggerated Use Of Accessory Muscles For Inspiration] : no accessory muscle use [Auscultation Breath Sounds / Voice Sounds] : lungs were clear to auscultation bilaterally [Heart Rate And Rhythm] : heart rate was normal and rhythm regular [Heart Sounds] : normal S1 and S2 [Heart Sounds Gallop] : no gallops [Edema] : there was no peripheral edema [Bowel Sounds] : normal bowel sounds [Abdomen Soft] : soft [Abdomen Tenderness] : non-tender [___ (cm) Fistula] : [unfilled] (cm) fistula [Bruit] : a bruit was present [Thrill] : a thrill was present [] : no rash [No Focal Deficits] : no focal deficits [Oriented To Time, Place, And Person] : oriented to person, place, and time [FreeTextEntry1] : RLQ incision well healed. No graft tenderness. + bruit

## 2024-07-29 ENCOUNTER — APPOINTMENT (OUTPATIENT)
Dept: INFECTIOUS DISEASE | Facility: CLINIC | Age: 69
End: 2024-07-29
Payer: MEDICARE

## 2024-07-29 VITALS
HEART RATE: 88 BPM | SYSTOLIC BLOOD PRESSURE: 159 MMHG | HEIGHT: 72 IN | BODY MASS INDEX: 27.36 KG/M2 | DIASTOLIC BLOOD PRESSURE: 80 MMHG | OXYGEN SATURATION: 100 % | WEIGHT: 202 LBS | RESPIRATION RATE: 16 BRPM

## 2024-07-29 PROCEDURE — 99213 OFFICE O/P EST LOW 20 MIN: CPT

## 2024-08-14 ENCOUNTER — APPOINTMENT (OUTPATIENT)
Dept: NEPHROLOGY | Facility: CLINIC | Age: 69
End: 2024-08-14
Payer: MEDICARE

## 2024-08-14 VITALS
DIASTOLIC BLOOD PRESSURE: 82 MMHG | OXYGEN SATURATION: 97 % | TEMPERATURE: 98.4 F | HEART RATE: 97 BPM | SYSTOLIC BLOOD PRESSURE: 156 MMHG | WEIGHT: 190 LBS | BODY MASS INDEX: 25.77 KG/M2

## 2024-08-14 PROCEDURE — 99215 OFFICE O/P EST HI 40 MIN: CPT

## 2024-08-14 RX ORDER — ADHESIVE TAPE 3"X 2.3 YD
50 MCG TAPE, NON-MEDICATED TOPICAL
Refills: 0 | Status: ACTIVE | COMMUNITY
Start: 2024-08-14

## 2024-08-14 NOTE — HISTORY OF PRESENT ILLNESS
[FreeTextEntry1] : 68-year-old man with ESRD on HD since 6/2022, received LURD kidney transplant from a 32-year-old altruistic donor on 3/25/24  PMH: CKD and proteinuria related to obesity (?FSGS) dx in 1985, Biopsy in 1989,Obesity lap band in 2010 with 150lbs weight loss, deflated since 2019, DM dx 1992 never on insulin, HLD, Hypothyroidism, Portal HTN and liver fibrosis likely duet o metabolic d/o associated steatotic liver disease, PVD s/p left toe amputation 2019, KRYSTIAN improved after weight loss, h/o pericardial effusion s/p pericardiocentesis 11/2022, left forearm AV fistula. Strong family history of kidney disease, Mom had CKD, Sister had a kidney transplant.  Lives with wife. Retired in 2021  CMV D-/R-, EBV D+/R+, HLA mismatch 2, 2, 2. No DSA. Pt had asymptomatic COVID +ve infection on pre op testing, repeat negative after admission. Donor has latent TB, recipient started on INH/B6 Did well post op. He was discharged on post op day 4 with a serum creatinine of 1.1mg/dL.  Transplant ureteric stent removed on 5/16/24.   Presents for scheduled follow up. Last seen 3 weeks ago.  Saw primary nephrologist in July No major events since last visit.  Blood glucose fasting ~ 160-200, random 260's, Ozempic increased to 1mg by endo. Following a diabetic diet.  Weight 190, down 12lbs  BP at home 100-130 systolic.  He feels well. Voiding urine without difficulty. No dysuria, no hematuria, good stream.  No nausea or vomiting. No diarrhea. Appetite is good. No fever, chills.  No chest pain or shortness of breath. Taking all medications as prescribed.  Still doing Physical Therapy and physically active around the house.

## 2024-08-14 NOTE — ASSESSMENT
[FreeTextEntry1] : 68-year-old man with ESRD on HD since 6/2022, received LURD kidney transplant from a 32-year-old altruistic donor on 3/25/24 PMH: CKD and proteinuria related to obesity (?FSGS) dx in 1985, Biopsy in 1989, Obesity lap band in 2010 with 150lbs weight loss, deflated since 2019, DM dx 1992 never on insulin, HLD, Hypothyroidism, Portal HTN and liver fibrosis likely due to metabolic d/o associated steatotic liver disease, PVD s/p left toe amputation 2019, KRYSTIAN improved after weight loss, h/o pericardial effusion s/p pericardiocentesis 11/2022, left forearm AV fistula.  S/p LURD transplant 3/25/24 with excellent graft function cr ~ 1mg/dL and no proteinuria.   Immunosuppression Simulect induction Envarsus 3mg daily. Level 8.8 at goal.  Aim for trough 8-10 Continue MMF 1 gram po bid Prednisone 5mg daily  Infection prophylaxis CMV low risk (D-R-), completed Acyclovir 400mg po bid for 3 months Continue Bactrim SS daily INH/B6 x 9 months for donor with latent TB until December   Diabetes II. Was sub optimal on Prandin alone with  A1c 8.2%. Started Glipizide 10 and Ozempic 1 gm, followed by endocrinology. Has an appt in Sept.  Overweight :-  Losing weight on Ozempic. BMI 25.7   HTN and h/o arrythmia - pre transplant he was on low dose metoprolol and midodrine for intradialytic hypotension On Metoprolol 25mg po bid and Amlodipine 5mg daily. BP well controlled at home.   Hypothyroidism - continue Synthroid 125mcg po daily  Hypomagnesemia - mg oxide 400gm po bid  Follow up with Primary nephrologist Sukhi Patten scheduled in October  Derm appointment - scheduled 9/2024  F/u back here scheduled in November

## 2024-08-15 ENCOUNTER — NON-APPOINTMENT (OUTPATIENT)
Age: 69
End: 2024-08-15

## 2024-08-15 LAB
ALBUMIN SERPL ELPH-MCNC: 4.5 G/DL
ALP BLD-CCNC: 89 U/L
ALT SERPL-CCNC: 33 U/L
ANION GAP SERPL CALC-SCNC: 17 MMOL/L
APPEARANCE: CLEAR
AST SERPL-CCNC: 29 U/L
BACTERIA: NEGATIVE /HPF
BASOPHILS # BLD AUTO: 0.04 K/UL
BASOPHILS NFR BLD AUTO: 0.5 %
BILIRUB SERPL-MCNC: 0.5 MG/DL
BILIRUBIN URINE: NEGATIVE
BKV DNA SPEC QL NAA+PROBE: NOT DETECTED IU/ML
BLOOD URINE: NEGATIVE
BUN SERPL-MCNC: 16 MG/DL
CALCIUM SERPL-MCNC: 10.2 MG/DL
CAST: 0 /LPF
CHLORIDE SERPL-SCNC: 99 MMOL/L
CMV DNA SPEC QL NAA+PROBE: NOT DETECTED IU/ML
CMVPCR LOG: NOT DETECTED LOG10IU/ML
CO2 SERPL-SCNC: 23 MMOL/L
COLOR: YELLOW
CREAT SERPL-MCNC: 1.01 MG/DL
CREAT SPEC-SCNC: 165 MG/DL
CREAT/PROT UR: 0.1 RATIO
EGFR: 82 ML/MIN/1.73M2
EOSINOPHIL # BLD AUTO: 0.06 K/UL
EOSINOPHIL NFR BLD AUTO: 0.8 %
EPITHELIAL CELLS: 0 /HPF
GLUCOSE QUALITATIVE U: 500 MG/DL
GLUCOSE SERPL-MCNC: 212 MG/DL
HCT VFR BLD CALC: 39.8 %
HGB BLD-MCNC: 13.5 G/DL
IMM GRANULOCYTES NFR BLD AUTO: 0.7 %
KETONES URINE: NEGATIVE MG/DL
LEUKOCYTE ESTERASE URINE: ABNORMAL
LYMPHOCYTES # BLD AUTO: 1.14 K/UL
LYMPHOCYTES NFR BLD AUTO: 15.5 %
MAGNESIUM SERPL-MCNC: 1.9 MG/DL
MAN DIFF?: NORMAL
MCHC RBC-ENTMCNC: 33 PG
MCHC RBC-ENTMCNC: 33.9 GM/DL
MCV RBC AUTO: 97.3 FL
MICROSCOPIC-UA: NORMAL
MONOCYTES # BLD AUTO: 0.42 K/UL
MONOCYTES NFR BLD AUTO: 5.7 %
NEUTROPHILS # BLD AUTO: 5.66 K/UL
NEUTROPHILS NFR BLD AUTO: 76.8 %
NITRITE URINE: NEGATIVE
PH URINE: 5.5
PHOSPHATE SERPL-MCNC: 2.8 MG/DL
PLATELET # BLD AUTO: 145 K/UL
POTASSIUM SERPL-SCNC: 4.9 MMOL/L
PROT SERPL-MCNC: 6.8 G/DL
PROT UR-MCNC: 20 MG/DL
PROTEIN URINE: NORMAL MG/DL
RBC # BLD: 4.09 M/UL
RBC # FLD: 12.9 %
RED BLOOD CELLS URINE: 2 /HPF
SODIUM SERPL-SCNC: 139 MMOL/L
SPECIFIC GRAVITY URINE: 1.03
TACROLIMUS SERPL-MCNC: 8.8 NG/ML
URATE SERPL-MCNC: 5.1 MG/DL
UROBILINOGEN URINE: 0.2 MG/DL
WBC # FLD AUTO: 7.37 K/UL
WHITE BLOOD CELLS URINE: 1 /HPF

## 2024-08-19 ENCOUNTER — APPOINTMENT (OUTPATIENT)
Dept: INFECTIOUS DISEASE | Facility: CLINIC | Age: 69
End: 2024-08-19
Payer: MEDICARE

## 2024-08-19 VITALS
OXYGEN SATURATION: 100 % | SYSTOLIC BLOOD PRESSURE: 146 MMHG | HEART RATE: 62 BPM | HEIGHT: 72 IN | RESPIRATION RATE: 16 BRPM | WEIGHT: 190 LBS | DIASTOLIC BLOOD PRESSURE: 75 MMHG | TEMPERATURE: 98.4 F | BODY MASS INDEX: 25.73 KG/M2

## 2024-08-19 DIAGNOSIS — Z94.0 KIDNEY TRANSPLANT STATUS: ICD-10-CM

## 2024-08-19 PROCEDURE — 99213 OFFICE O/P EST LOW 20 MIN: CPT

## 2024-09-03 ENCOUNTER — APPOINTMENT (OUTPATIENT)
Dept: ULTRASOUND IMAGING | Facility: CLINIC | Age: 69
End: 2024-09-03
Payer: MEDICARE

## 2024-09-03 ENCOUNTER — OUTPATIENT (OUTPATIENT)
Dept: OUTPATIENT SERVICES | Facility: HOSPITAL | Age: 69
LOS: 1 days | End: 2024-09-03
Payer: MEDICARE

## 2024-09-03 DIAGNOSIS — K74.00 HEPATIC FIBROSIS, UNSPECIFIED: ICD-10-CM

## 2024-09-03 DIAGNOSIS — Z89.429 ACQUIRED ABSENCE OF OTHER TOE(S), UNSPECIFIED SIDE: Chronic | ICD-10-CM

## 2024-09-03 DIAGNOSIS — Z87.898 PERSONAL HISTORY OF OTHER SPECIFIED CONDITIONS: Chronic | ICD-10-CM

## 2024-09-03 DIAGNOSIS — Z98.49 CATARACT EXTRACTION STATUS, UNSPECIFIED EYE: Chronic | ICD-10-CM

## 2024-09-03 DIAGNOSIS — R79.0 ABNORMAL LEVEL OF BLOOD MINERAL: ICD-10-CM

## 2024-09-03 DIAGNOSIS — Z98.84 BARIATRIC SURGERY STATUS: Chronic | ICD-10-CM

## 2024-09-03 PROCEDURE — 76700 US EXAM ABDOM COMPLETE: CPT | Mod: 26

## 2024-09-03 PROCEDURE — 76700 US EXAM ABDOM COMPLETE: CPT

## 2024-09-18 ENCOUNTER — NON-APPOINTMENT (OUTPATIENT)
Age: 69
End: 2024-09-18

## 2024-09-18 DIAGNOSIS — K74.60 UNSPECIFIED CIRRHOSIS OF LIVER: ICD-10-CM

## 2024-09-18 DIAGNOSIS — Z01.818 ENCOUNTER FOR OTHER PREPROCEDURAL EXAMINATION: ICD-10-CM

## 2024-09-19 ENCOUNTER — APPOINTMENT (OUTPATIENT)
Dept: DERMATOLOGY | Facility: CLINIC | Age: 69
End: 2024-09-19

## 2024-09-19 DIAGNOSIS — D22.9 MELANOCYTIC NEVI, UNSPECIFIED: ICD-10-CM

## 2024-09-19 DIAGNOSIS — B07.9 VIRAL WART, UNSPECIFIED: ICD-10-CM

## 2024-09-19 DIAGNOSIS — D18.01 HEMANGIOMA OF SKIN AND SUBCUTANEOUS TISSUE: ICD-10-CM

## 2024-09-19 PROCEDURE — 17110 DESTRUCTION B9 LES UP TO 14: CPT

## 2024-09-19 PROCEDURE — 99203 OFFICE O/P NEW LOW 30 MIN: CPT | Mod: 25

## 2024-09-20 LAB
ALBUMIN SERPL ELPH-MCNC: 4.7 G/DL
ALP BLD-CCNC: 78 U/L
ALT SERPL-CCNC: 28 U/L
ANION GAP SERPL CALC-SCNC: 15 MMOL/L
AST SERPL-CCNC: 26 U/L
BILIRUB SERPL-MCNC: 0.5 MG/DL
BUN SERPL-MCNC: 13 MG/DL
CALCIUM SERPL-MCNC: 10.2 MG/DL
CHLORIDE SERPL-SCNC: 96 MMOL/L
CO2 SERPL-SCNC: 26 MMOL/L
CREAT SERPL-MCNC: 1.06 MG/DL
EGFR: 77 ML/MIN/1.73M2
GLUCOSE SERPL-MCNC: 115 MG/DL
HCT VFR BLD CALC: 43.1 %
HGB BLD-MCNC: 14.6 G/DL
MCHC RBC-ENTMCNC: 32.8 PG
MCHC RBC-ENTMCNC: 33.9 GM/DL
MCV RBC AUTO: 96.9 FL
PLATELET # BLD AUTO: 162 K/UL
POTASSIUM SERPL-SCNC: 4.3 MMOL/L
PROT SERPL-MCNC: 7 G/DL
RBC # BLD: 4.45 M/UL
RBC # FLD: 13.2 %
SODIUM SERPL-SCNC: 136 MMOL/L
WBC # FLD AUTO: 7.98 K/UL

## 2024-09-27 PROBLEM — B07.9 VERRUCA VULGARIS: Status: ACTIVE | Noted: 2024-09-27

## 2024-09-27 PROBLEM — D18.01 CHERRY ANGIOMA: Status: ACTIVE | Noted: 2024-09-27

## 2024-09-27 PROBLEM — D22.9 ACQUIRED MELANOCYTIC NEVUS: Status: ACTIVE | Noted: 2024-09-27

## 2024-09-27 NOTE — ASSESSMENT
[FreeTextEntry1] : 1) Nevi/cherry angiomas - Counseled about clinically benign lesions - Discussed regular OTC sunscreen use, SPF 30 or higher   2) VV, chin - differential includes verrucous acrochordon  - Treated 1 lesion w/ LN2 X 2 cycles The risks/benefits/alternatives of cryo-destruction was explained to the patient which include but are not limited to redness, pain, blistering, scar, discoloration of skin, and recurrence. The patient expressed understanding of these risks and agreed to the procedure. The procedure was well tolerated, without complication. We have discussed related skin care.   3) Skin cancer Screening - No lesions clinically concerning for malignancy today - Discussed regular self skin checks and ABCDEs of skin cancer screening - Discussed regular sunscreen use - Pt instructed to report any new or changing lesions  RTC 1 yr for FBSE or sooner if any concerns  RTC 1 month for repeat LN2 or prn

## 2024-09-27 NOTE — HISTORY OF PRESENT ILLNESS
[FreeTextEntry1] : new pt: moles [de-identified] : 69 y/o M s/p kidney transplant presenting for a mole check. Requests FBSE.  No personal or FH of skin cancer.

## 2024-09-27 NOTE — PHYSICAL EXAM
[Full Body Skin Exam Performed] : performed [FreeTextEntry3] : Fairly uniform and regular brown macules and papules on the trunk and extremities  Several scattered red partially blanching papules on the trunk and extremities.  verrucous papule on the chin

## 2024-10-09 DIAGNOSIS — Z94.0 KIDNEY TRANSPLANT STATUS: ICD-10-CM

## 2024-10-18 ENCOUNTER — NON-APPOINTMENT (OUTPATIENT)
Age: 69
End: 2024-10-18

## 2024-10-21 ENCOUNTER — APPOINTMENT (OUTPATIENT)
Dept: DERMATOLOGY | Facility: CLINIC | Age: 69
End: 2024-10-21
Payer: MEDICARE

## 2024-10-21 VITALS — WEIGHT: 190 LBS | BODY MASS INDEX: 25.73 KG/M2 | HEIGHT: 72 IN

## 2024-10-21 DIAGNOSIS — B07.9 VIRAL WART, UNSPECIFIED: ICD-10-CM

## 2024-10-21 PROCEDURE — 99213 OFFICE O/P EST LOW 20 MIN: CPT

## 2024-11-11 ENCOUNTER — APPOINTMENT (OUTPATIENT)
Dept: INFECTIOUS DISEASE | Facility: CLINIC | Age: 69
End: 2024-11-11
Payer: MEDICARE

## 2024-11-11 VITALS
HEART RATE: 75 BPM | WEIGHT: 204 LBS | TEMPERATURE: 97.9 F | HEIGHT: 72 IN | OXYGEN SATURATION: 99 % | SYSTOLIC BLOOD PRESSURE: 168 MMHG | DIASTOLIC BLOOD PRESSURE: 91 MMHG | BODY MASS INDEX: 27.63 KG/M2

## 2024-11-11 DIAGNOSIS — Z94.0 KIDNEY TRANSPLANT STATUS: ICD-10-CM

## 2024-11-11 PROCEDURE — 99213 OFFICE O/P EST LOW 20 MIN: CPT

## 2024-11-13 LAB
ALBUMIN SERPL ELPH-MCNC: 4.6 G/DL
ALP BLD-CCNC: 69 U/L
ALT SERPL-CCNC: 30 U/L
ANION GAP SERPL CALC-SCNC: 13 MMOL/L
AST SERPL-CCNC: 31 U/L
BILIRUB SERPL-MCNC: 0.6 MG/DL
BUN SERPL-MCNC: 17 MG/DL
CALCIUM SERPL-MCNC: 10.3 MG/DL
CHLORIDE SERPL-SCNC: 98 MMOL/L
CO2 SERPL-SCNC: 26 MMOL/L
CREAT SERPL-MCNC: 1.02 MG/DL
EGFR: 81 ML/MIN/1.73M2
GLUCOSE SERPL-MCNC: 119 MG/DL
HCT VFR BLD CALC: 40 %
HGB BLD-MCNC: 13.1 G/DL
MCHC RBC-ENTMCNC: 32.4 PG
MCHC RBC-ENTMCNC: 32.8 G/DL
MCV RBC AUTO: 99 FL
PLATELET # BLD AUTO: 176 K/UL
POTASSIUM SERPL-SCNC: 4.2 MMOL/L
PROT SERPL-MCNC: 6.9 G/DL
RBC # BLD: 4.04 M/UL
RBC # FLD: 13.9 %
SODIUM SERPL-SCNC: 137 MMOL/L
WBC # FLD AUTO: 6.62 K/UL

## 2024-11-15 ENCOUNTER — RX RENEWAL (OUTPATIENT)
Age: 69
End: 2024-11-15

## 2024-11-15 DIAGNOSIS — Z22.7 LATENT TUBERCULOSIS: ICD-10-CM

## 2024-11-15 RX ORDER — UREA 10 %
50 LOTION (ML) TOPICAL
Qty: 30 | Refills: 0 | Status: ACTIVE | COMMUNITY
Start: 2024-11-15 | End: 1900-01-01

## 2024-11-19 ENCOUNTER — NON-APPOINTMENT (OUTPATIENT)
Age: 69
End: 2024-11-19

## 2024-11-19 ENCOUNTER — APPOINTMENT (OUTPATIENT)
Dept: DERMATOLOGY | Facility: CLINIC | Age: 69
End: 2024-11-19
Payer: MEDICARE

## 2024-11-19 DIAGNOSIS — B07.9 VIRAL WART, UNSPECIFIED: ICD-10-CM

## 2024-11-19 PROCEDURE — 99212 OFFICE O/P EST SF 10 MIN: CPT

## 2024-11-19 NOTE — ASU DISCHARGE PLAN (ADULT/PEDIATRIC) - ASU DC SPECIAL INSTRUCTIONSFT
Detail Level: Zone Resume all home medications  Keep all scheduled appointments in Transplant Office Detail Level: Detailed

## 2024-11-20 ENCOUNTER — APPOINTMENT (OUTPATIENT)
Dept: NEPHROLOGY | Facility: CLINIC | Age: 69
End: 2024-11-20
Payer: MEDICARE

## 2024-11-20 VITALS
WEIGHT: 204 LBS | OXYGEN SATURATION: 99 % | DIASTOLIC BLOOD PRESSURE: 94 MMHG | SYSTOLIC BLOOD PRESSURE: 165 MMHG | HEIGHT: 72 IN | BODY MASS INDEX: 27.63 KG/M2 | HEART RATE: 67 BPM | TEMPERATURE: 97.5 F

## 2024-11-20 PROCEDURE — 99215 OFFICE O/P EST HI 40 MIN: CPT

## 2024-11-20 RX ORDER — INSULIN ASPART 100 [IU]/ML
(70-30) 100 INJECTION, SUSPENSION SUBCUTANEOUS
Refills: 0 | Status: ACTIVE | COMMUNITY
Start: 2024-11-20

## 2024-11-21 ENCOUNTER — NON-APPOINTMENT (OUTPATIENT)
Age: 69
End: 2024-11-21

## 2024-11-21 LAB
25(OH)D3 SERPL-MCNC: 59.1 NG/ML
ALBUMIN SERPL ELPH-MCNC: 4.2 G/DL
ALP BLD-CCNC: 70 U/L
ALT SERPL-CCNC: 27 U/L
ANION GAP SERPL CALC-SCNC: 11 MMOL/L
APPEARANCE: CLEAR
AST SERPL-CCNC: 24 U/L
BASOPHILS # BLD AUTO: 0.03 K/UL
BASOPHILS NFR BLD AUTO: 0.5 %
BILIRUB SERPL-MCNC: 0.4 MG/DL
BILIRUBIN URINE: NEGATIVE
BLOOD URINE: NEGATIVE
BUN SERPL-MCNC: 16 MG/DL
CALCIUM SERPL-MCNC: 9.8 MG/DL
CALCIUM SERPL-MCNC: 9.8 MG/DL
CHLORIDE SERPL-SCNC: 98 MMOL/L
CHOLEST SERPL-MCNC: 151 MG/DL
CMV DNA SPEC QL NAA+PROBE: NOT DETECTED IU/ML
CMVPCR LOG: NOT DETECTED LOG10IU/ML
CO2 SERPL-SCNC: 28 MMOL/L
COLOR: YELLOW
CREAT SERPL-MCNC: 0.92 MG/DL
CREAT SPEC-SCNC: 96 MG/DL
CREAT/PROT UR: 0.1 RATIO
EGFR: 91 ML/MIN/1.73M2
EOSINOPHIL # BLD AUTO: 0.05 K/UL
EOSINOPHIL NFR BLD AUTO: 0.8 %
ESTIMATED AVERAGE GLUCOSE: 160 MG/DL
GLUCOSE QUALITATIVE U: >=1000 MG/DL
GLUCOSE SERPL-MCNC: 227 MG/DL
HBA1C MFR BLD HPLC: 7.2 %
HCT VFR BLD CALC: 39.4 %
HDLC SERPL-MCNC: 62 MG/DL
HGB BLD-MCNC: 13.1 G/DL
IMM GRANULOCYTES NFR BLD AUTO: 0.3 %
KETONES URINE: NEGATIVE MG/DL
LDH SERPL-CCNC: 205 U/L
LDLC SERPL CALC-MCNC: 76 MG/DL
LEUKOCYTE ESTERASE URINE: NEGATIVE
LYMPHOCYTES # BLD AUTO: 1.07 K/UL
LYMPHOCYTES NFR BLD AUTO: 17.3 %
MAGNESIUM SERPL-MCNC: 1.9 MG/DL
MAN DIFF?: NORMAL
MCHC RBC-ENTMCNC: 33.1 PG
MCHC RBC-ENTMCNC: 33.2 G/DL
MCV RBC AUTO: 99.5 FL
MONOCYTES # BLD AUTO: 0.43 K/UL
MONOCYTES NFR BLD AUTO: 7 %
NEUTROPHILS # BLD AUTO: 4.58 K/UL
NEUTROPHILS NFR BLD AUTO: 74.1 %
NITRITE URINE: NEGATIVE
NONHDLC SERPL-MCNC: 89 MG/DL
PARATHYROID HORMONE INTACT: 40 PG/ML
PH URINE: 6
PHOSPHATE SERPL-MCNC: 2.9 MG/DL
PLATELET # BLD AUTO: 151 K/UL
POTASSIUM SERPL-SCNC: 4.1 MMOL/L
PROT SERPL-MCNC: 6.6 G/DL
PROT UR-MCNC: 12 MG/DL
PROTEIN URINE: NEGATIVE MG/DL
RBC # BLD: 3.96 M/UL
RBC # FLD: 13.6 %
SODIUM SERPL-SCNC: 137 MMOL/L
SPECIFIC GRAVITY URINE: >1.03
TACROLIMUS SERPL-MCNC: 9.7 NG/ML
TRIGL SERPL-MCNC: 68 MG/DL
URATE SERPL-MCNC: 5.5 MG/DL
UROBILINOGEN URINE: 0.2 MG/DL
WBC # FLD AUTO: 6.18 K/UL

## 2024-11-27 LAB — BKV DNA SPEC QL NAA+PROBE: NOT DETECTED IU/ML

## 2024-12-04 ENCOUNTER — APPOINTMENT (OUTPATIENT)
Dept: NEPHROLOGY | Facility: CLINIC | Age: 69
End: 2024-12-04
Payer: MEDICARE

## 2024-12-04 ENCOUNTER — LABORATORY RESULT (OUTPATIENT)
Age: 69
End: 2024-12-04

## 2024-12-04 VITALS
RESPIRATION RATE: 16 BRPM | TEMPERATURE: 97.6 F | OXYGEN SATURATION: 100 % | HEART RATE: 70 BPM | DIASTOLIC BLOOD PRESSURE: 80 MMHG | HEIGHT: 72 IN | SYSTOLIC BLOOD PRESSURE: 143 MMHG | BODY MASS INDEX: 27.09 KG/M2 | WEIGHT: 200 LBS

## 2024-12-04 LAB
ALBUMIN SERPL ELPH-MCNC: 4.5 G/DL
ALP BLD-CCNC: 72 U/L
ALT SERPL-CCNC: 30 U/L
ANION GAP SERPL CALC-SCNC: 11 MMOL/L
AST SERPL-CCNC: 28 U/L
BASOPHILS # BLD AUTO: 0.02 K/UL
BASOPHILS NFR BLD AUTO: 0.3 %
BILIRUB SERPL-MCNC: 0.6 MG/DL
BUN SERPL-MCNC: 15 MG/DL
CALCIUM SERPL-MCNC: 9.8 MG/DL
CHLORIDE SERPL-SCNC: 101 MMOL/L
CO2 SERPL-SCNC: 28 MMOL/L
CREAT SERPL-MCNC: 0.96 MG/DL
CREAT SPEC-SCNC: 80 MG/DL
CREAT/PROT UR: 0.1 RATIO
EGFR: 87 ML/MIN/1.73M2
EOSINOPHIL # BLD AUTO: 0.05 K/UL
EOSINOPHIL NFR BLD AUTO: 0.8 %
GLUCOSE SERPL-MCNC: 133 MG/DL
HCT VFR BLD CALC: 39.4 %
HGB BLD-MCNC: 13.4 G/DL
IMM GRANULOCYTES NFR BLD AUTO: 0.2 %
LDH SERPL-CCNC: 192 U/L
LYMPHOCYTES # BLD AUTO: 1.27 K/UL
LYMPHOCYTES NFR BLD AUTO: 19.8 %
MAGNESIUM SERPL-MCNC: 1.9 MG/DL
MAN DIFF?: NORMAL
MCHC RBC-ENTMCNC: 32.7 PG
MCHC RBC-ENTMCNC: 34 G/DL
MCV RBC AUTO: 96.1 FL
MONOCYTES # BLD AUTO: 0.39 K/UL
MONOCYTES NFR BLD AUTO: 6.1 %
NEUTROPHILS # BLD AUTO: 4.67 K/UL
NEUTROPHILS NFR BLD AUTO: 72.8 %
PHOSPHATE SERPL-MCNC: 2.8 MG/DL
PLATELET # BLD AUTO: 155 K/UL
POTASSIUM SERPL-SCNC: 3.7 MMOL/L
PROT SERPL-MCNC: 6.8 G/DL
PROT UR-MCNC: 9 MG/DL
RBC # BLD: 4.1 M/UL
RBC # FLD: 13.7 %
SODIUM SERPL-SCNC: 140 MMOL/L
TACROLIMUS SERPL-MCNC: 6.4 NG/ML
URATE SERPL-MCNC: 5.5 MG/DL
WBC # FLD AUTO: 6.41 K/UL

## 2024-12-04 PROCEDURE — 99214 OFFICE O/P EST MOD 30 MIN: CPT

## 2024-12-05 ENCOUNTER — NON-APPOINTMENT (OUTPATIENT)
Age: 69
End: 2024-12-05

## 2024-12-05 LAB
APPEARANCE: CLEAR
BILIRUBIN URINE: NEGATIVE
BLOOD URINE: NEGATIVE
CMV DNA SPEC QL NAA+PROBE: NOT DETECTED IU/ML
CMVPCR LOG: NOT DETECTED LOG10IU/ML
COLOR: YELLOW
GLUCOSE QUALITATIVE U: NEGATIVE MG/DL
KETONES URINE: NEGATIVE MG/DL
LEUKOCYTE ESTERASE URINE: ABNORMAL
NITRITE URINE: NEGATIVE
PH URINE: 6
PROTEIN URINE: NEGATIVE MG/DL
SPECIFIC GRAVITY URINE: 1.02
UROBILINOGEN URINE: 0.2 MG/DL

## 2024-12-11 LAB — BKV DNA SPEC QL NAA+PROBE: NOT DETECTED IU/ML

## 2024-12-16 ENCOUNTER — APPOINTMENT (OUTPATIENT)
Dept: HEPATOLOGY | Facility: CLINIC | Age: 69
End: 2024-12-16
Payer: MEDICARE

## 2024-12-16 VITALS
HEART RATE: 63 BPM | HEIGHT: 72 IN | WEIGHT: 200 LBS | OXYGEN SATURATION: 100 % | TEMPERATURE: 98.3 F | SYSTOLIC BLOOD PRESSURE: 136 MMHG | DIASTOLIC BLOOD PRESSURE: 63 MMHG | BODY MASS INDEX: 27.09 KG/M2 | RESPIRATION RATE: 16 BRPM

## 2024-12-16 DIAGNOSIS — E11.9 TYPE 2 DIABETES MELLITUS W/OUT COMPLICATIONS: ICD-10-CM

## 2024-12-16 DIAGNOSIS — K74.60 UNSPECIFIED CIRRHOSIS OF LIVER: ICD-10-CM

## 2024-12-16 PROCEDURE — 99214 OFFICE O/P EST MOD 30 MIN: CPT

## 2025-01-30 DIAGNOSIS — Z94.0 KIDNEY TRANSPLANT STATUS: ICD-10-CM

## 2025-02-05 ENCOUNTER — APPOINTMENT (OUTPATIENT)
Dept: NEPHROLOGY | Facility: CLINIC | Age: 70
End: 2025-02-05
Payer: MEDICARE

## 2025-02-05 ENCOUNTER — NON-APPOINTMENT (OUTPATIENT)
Age: 70
End: 2025-02-05

## 2025-02-05 VITALS
OXYGEN SATURATION: 99 % | HEART RATE: 63 BPM | WEIGHT: 209 LBS | SYSTOLIC BLOOD PRESSURE: 148 MMHG | RESPIRATION RATE: 16 BRPM | HEIGHT: 72 IN | DIASTOLIC BLOOD PRESSURE: 79 MMHG | BODY MASS INDEX: 28.31 KG/M2 | TEMPERATURE: 98 F

## 2025-02-05 LAB
ALBUMIN SERPL ELPH-MCNC: 4.4 G/DL
ALP BLD-CCNC: 76 U/L
ALT SERPL-CCNC: 30 U/L
ANION GAP SERPL CALC-SCNC: 9 MMOL/L
APPEARANCE: CLEAR
AST SERPL-CCNC: 26 U/L
BACTERIA: NEGATIVE /HPF
BASOPHILS # BLD AUTO: 0.03 K/UL
BASOPHILS NFR BLD AUTO: 0.5 %
BILIRUB SERPL-MCNC: 0.4 MG/DL
BILIRUBIN URINE: NEGATIVE
BLOOD URINE: NEGATIVE
BUN SERPL-MCNC: 17 MG/DL
CALCIUM SERPL-MCNC: 9.5 MG/DL
CAST: 0 /LPF
CHLORIDE SERPL-SCNC: 101 MMOL/L
CO2 SERPL-SCNC: 29 MMOL/L
COLOR: YELLOW
CREAT SERPL-MCNC: 0.96 MG/DL
CREAT SPEC-SCNC: 52 MG/DL
CREAT/PROT UR: 0.1 RATIO
EGFR: 87 ML/MIN/1.73M2
EOSINOPHIL # BLD AUTO: 0.06 K/UL
EOSINOPHIL NFR BLD AUTO: 0.9 %
EPITHELIAL CELLS: 0 /HPF
GLUCOSE QUALITATIVE U: NEGATIVE MG/DL
GLUCOSE SERPL-MCNC: 141 MG/DL
HCT VFR BLD CALC: 40.5 %
HGB BLD-MCNC: 13.6 G/DL
IMM GRANULOCYTES NFR BLD AUTO: 0.3 %
KETONES URINE: NEGATIVE MG/DL
LEUKOCYTE ESTERASE URINE: ABNORMAL
LYMPHOCYTES # BLD AUTO: 1 K/UL
LYMPHOCYTES NFR BLD AUTO: 15.4 %
MAGNESIUM SERPL-MCNC: 2 MG/DL
MAN DIFF?: NORMAL
MCHC RBC-ENTMCNC: 32.9 PG
MCHC RBC-ENTMCNC: 33.6 G/DL
MCV RBC AUTO: 98.1 FL
MICROSCOPIC-UA: NORMAL
MONOCYTES # BLD AUTO: 0.44 K/UL
MONOCYTES NFR BLD AUTO: 6.8 %
NEUTROPHILS # BLD AUTO: 4.95 K/UL
NEUTROPHILS NFR BLD AUTO: 76.1 %
NITRITE URINE: NEGATIVE
PH URINE: 6
PHOSPHATE SERPL-MCNC: 2.8 MG/DL
PLATELET # BLD AUTO: 132 K/UL
POTASSIUM SERPL-SCNC: 4 MMOL/L
PROT SERPL-MCNC: 6.6 G/DL
PROT UR-MCNC: 5 MG/DL
PROTEIN URINE: NEGATIVE MG/DL
RBC # BLD: 4.13 M/UL
RBC # FLD: 13.4 %
RED BLOOD CELLS URINE: 0 /HPF
SODIUM SERPL-SCNC: 139 MMOL/L
SPECIFIC GRAVITY URINE: 1.01
TACROLIMUS SERPL-MCNC: 5.2 NG/ML
UROBILINOGEN URINE: 0.2 MG/DL
WBC # FLD AUTO: 6.5 K/UL
WHITE BLOOD CELLS URINE: 1 /HPF

## 2025-02-05 PROCEDURE — 99215 OFFICE O/P EST HI 40 MIN: CPT

## 2025-02-06 LAB — BKV DNA SPEC QL NAA+PROBE: NOT DETECTED IU/ML

## 2025-02-27 DIAGNOSIS — Z94.0 KIDNEY TRANSPLANT STATUS: ICD-10-CM

## 2025-02-27 RX ORDER — AMOXICILLIN 500 MG/1
500 CAPSULE ORAL
Qty: 4 | Refills: 2 | Status: ACTIVE | COMMUNITY
Start: 2025-02-27 | End: 1900-01-01

## 2025-03-01 ENCOUNTER — OUTPATIENT (OUTPATIENT)
Dept: OUTPATIENT SERVICES | Facility: HOSPITAL | Age: 70
LOS: 1 days | End: 2025-03-01
Payer: MEDICARE

## 2025-03-01 ENCOUNTER — APPOINTMENT (OUTPATIENT)
Dept: ULTRASOUND IMAGING | Facility: CLINIC | Age: 70
End: 2025-03-01

## 2025-03-01 DIAGNOSIS — Z89.429 ACQUIRED ABSENCE OF OTHER TOE(S), UNSPECIFIED SIDE: Chronic | ICD-10-CM

## 2025-03-01 DIAGNOSIS — Z87.898 PERSONAL HISTORY OF OTHER SPECIFIED CONDITIONS: Chronic | ICD-10-CM

## 2025-03-01 DIAGNOSIS — Z98.84 BARIATRIC SURGERY STATUS: Chronic | ICD-10-CM

## 2025-03-01 DIAGNOSIS — Z98.49 CATARACT EXTRACTION STATUS, UNSPECIFIED EYE: Chronic | ICD-10-CM

## 2025-03-01 DIAGNOSIS — Z98.890 OTHER SPECIFIED POSTPROCEDURAL STATES: Chronic | ICD-10-CM

## 2025-03-01 DIAGNOSIS — K74.60 UNSPECIFIED CIRRHOSIS OF LIVER: ICD-10-CM

## 2025-03-01 PROCEDURE — 76700 US EXAM ABDOM COMPLETE: CPT | Mod: 26

## 2025-03-01 PROCEDURE — 76700 US EXAM ABDOM COMPLETE: CPT

## 2025-04-07 ENCOUNTER — APPOINTMENT (OUTPATIENT)
Dept: INFECTIOUS DISEASE | Facility: CLINIC | Age: 70
End: 2025-04-07

## 2025-05-06 DIAGNOSIS — Z94.0 KIDNEY TRANSPLANT STATUS: ICD-10-CM

## 2025-05-07 ENCOUNTER — APPOINTMENT (OUTPATIENT)
Dept: NEPHROLOGY | Facility: CLINIC | Age: 70
End: 2025-05-07
Payer: MEDICARE

## 2025-05-07 VITALS
SYSTOLIC BLOOD PRESSURE: 135 MMHG | HEIGHT: 72 IN | DIASTOLIC BLOOD PRESSURE: 71 MMHG | RESPIRATION RATE: 16 BRPM | OXYGEN SATURATION: 100 % | BODY MASS INDEX: 28.58 KG/M2 | TEMPERATURE: 97.6 F | WEIGHT: 211 LBS | HEART RATE: 63 BPM

## 2025-05-07 PROCEDURE — 99215 OFFICE O/P EST HI 40 MIN: CPT

## 2025-05-08 ENCOUNTER — NON-APPOINTMENT (OUTPATIENT)
Age: 70
End: 2025-05-08

## 2025-05-08 LAB
25(OH)D3 SERPL-MCNC: 58.9 NG/ML
ALBUMIN SERPL ELPH-MCNC: 4.5 G/DL
ALP BLD-CCNC: 66 U/L
ALT SERPL-CCNC: 21 U/L
ANION GAP SERPL CALC-SCNC: 12 MMOL/L
APPEARANCE: CLEAR
AST SERPL-CCNC: 23 U/L
BACTERIA: NEGATIVE /HPF
BASOPHILS # BLD AUTO: 0.04 K/UL
BASOPHILS NFR BLD AUTO: 0.5 %
BILIRUB SERPL-MCNC: 0.7 MG/DL
BILIRUBIN URINE: NEGATIVE
BKV DNA SPEC QL NAA+PROBE: NOT DETECTED IU/ML
BLOOD URINE: NEGATIVE
BUN SERPL-MCNC: 14 MG/DL
CALCIUM SERPL-MCNC: 10 MG/DL
CALCIUM SERPL-MCNC: 10 MG/DL
CAST: 0 /LPF
CHLORIDE SERPL-SCNC: 102 MMOL/L
CHOLEST SERPL-MCNC: 157 MG/DL
CMV DNA SPEC QL NAA+PROBE: NOT DETECTED IU/ML
CMVPCR LOG: NOT DETECTED LOG10IU/ML
CO2 SERPL-SCNC: 27 MMOL/L
COLOR: YELLOW
CREAT SERPL-MCNC: 1 MG/DL
CREAT SPEC-SCNC: 42 MG/DL
CREAT/PROT UR: NORMAL RATIO
EGFRCR SERPLBLD CKD-EPI 2021: 82 ML/MIN/1.73M2
EOSINOPHIL # BLD AUTO: 0.07 K/UL
EOSINOPHIL NFR BLD AUTO: 0.9 %
EPITHELIAL CELLS: 0 /HPF
ESTIMATED AVERAGE GLUCOSE: 163 MG/DL
GLUCOSE QUALITATIVE U: NEGATIVE MG/DL
GLUCOSE SERPL-MCNC: 124 MG/DL
HBA1C MFR BLD HPLC: 7.3 %
HCT VFR BLD CALC: 42.3 %
HDLC SERPL-MCNC: 55 MG/DL
HGB BLD-MCNC: 14.3 G/DL
IMM GRANULOCYTES NFR BLD AUTO: 0.9 %
KETONES URINE: NEGATIVE MG/DL
LDH SERPL-CCNC: 203 U/L
LDLC SERPL-MCNC: 84 MG/DL
LEUKOCYTE ESTERASE URINE: ABNORMAL
LYMPHOCYTES # BLD AUTO: 1.35 K/UL
LYMPHOCYTES NFR BLD AUTO: 17.2 %
MAGNESIUM SERPL-MCNC: 2.1 MG/DL
MAN DIFF?: NORMAL
MCHC RBC-ENTMCNC: 32.5 PG
MCHC RBC-ENTMCNC: 33.8 G/DL
MCV RBC AUTO: 96.1 FL
MICROSCOPIC-UA: NORMAL
MONOCYTES # BLD AUTO: 0.49 K/UL
MONOCYTES NFR BLD AUTO: 6.2 %
NEUTROPHILS # BLD AUTO: 5.84 K/UL
NEUTROPHILS NFR BLD AUTO: 74.3 %
NITRITE URINE: NEGATIVE
NONHDLC SERPL-MCNC: 103 MG/DL
PARATHYROID HORMONE INTACT: 23 PG/ML
PH URINE: 6
PHOSPHATE SERPL-MCNC: 3.1 MG/DL
PLATELET # BLD AUTO: 139 K/UL
POTASSIUM SERPL-SCNC: 4.8 MMOL/L
PROT SERPL-MCNC: 6.9 G/DL
PROT UR-MCNC: <4 MG/DL
PROTEIN URINE: NEGATIVE MG/DL
RBC # BLD: 4.4 M/UL
RBC # FLD: 13.2 %
RED BLOOD CELLS URINE: 1 /HPF
SODIUM SERPL-SCNC: 141 MMOL/L
SPECIFIC GRAVITY URINE: 1.01
TACROLIMUS SERPL-MCNC: 5.8 NG/ML
TRIGL SERPL-MCNC: 106 MG/DL
URATE SERPL-MCNC: 5.1 MG/DL
UROBILINOGEN URINE: 0.2 MG/DL
WBC # FLD AUTO: 7.86 K/UL
WHITE BLOOD CELLS URINE: 1 /HPF

## 2025-06-02 ENCOUNTER — LABORATORY RESULT (OUTPATIENT)
Age: 70
End: 2025-06-02

## 2025-06-04 ENCOUNTER — NON-APPOINTMENT (OUTPATIENT)
Age: 70
End: 2025-06-04

## 2025-06-07 NOTE — DIETITIAN INITIAL EVALUATION ADULT - NSICDXPASTSURGICALHX_GEN_ALL_CORE_FT
PAST SURGICAL HISTORY:  S/P hysterectomy     
PAST SURGICAL HISTORY:  H/O laparoscopic adjustable gastric banding ' 2010    History of amputation of toe     History of foreign body aspiration 7/2018  from Left Ear    S/P cataract extraction '08 and ' 2011   Bilateral    S/P partial thyroidectomy '95  benign

## 2025-06-16 ENCOUNTER — APPOINTMENT (OUTPATIENT)
Dept: HEPATOLOGY | Facility: CLINIC | Age: 70
End: 2025-06-16
Payer: MEDICARE

## 2025-06-16 VITALS
BODY MASS INDEX: 27.77 KG/M2 | TEMPERATURE: 97.8 F | HEIGHT: 72 IN | DIASTOLIC BLOOD PRESSURE: 97 MMHG | WEIGHT: 205 LBS | OXYGEN SATURATION: 100 % | SYSTOLIC BLOOD PRESSURE: 176 MMHG | HEART RATE: 77 BPM

## 2025-06-16 PROCEDURE — 99214 OFFICE O/P EST MOD 30 MIN: CPT

## 2025-06-23 LAB
ALPHA-1-FETOPROTEIN-L3: NORMAL %
ALPHA-1-FETOPROTEIN: 1.1 NG/ML

## 2025-07-01 ENCOUNTER — RX RENEWAL (OUTPATIENT)
Age: 70
End: 2025-07-01

## 2025-07-08 NOTE — H&P PST ADULT - PROBLEM SELECTOR PROBLEM 1
high-fat, processed foods.     Read food labels and try to avoid saturated and trans fats. They increase your risk of heart disease by raising cholesterol levels.     Limit the amount of solid fat--butter, margarine, and shortening--you eat. Use olive, peanut, or canola oil when you cook. Bake, broil, and steam foods instead of frying them.     Eat a variety of fruit and vegetables every day. Dark green, deep orange, red, or yellow fruits and vegetables are especially good for you. Examples include spinach, carrots, peaches, and berries.     Foods high in fiber can reduce your cholesterol and provide important vitamins and minerals. High-fiber foods include whole-grain cereals and breads, oatmeal, beans, brown rice, citrus fruits, and apples.     Eat lean proteins. Heart-healthy proteins include seafood, lean meats and poultry, eggs, beans, peas, nuts, seeds, and soy products.     Limit drinks and foods with added sugar. These include candy, desserts, and soda pop.   Heart-healthy lifestyle    If your doctor recommends it, get more exercise. For many people, walking is a good choice. Or you may want to swim, bike, or do other activities. Bit by bit, increase the time you're active every day. Try for at least 30 minutes on most days of the week.     Try to quit or cut back on using tobacco and other nicotine products. This includes smoking and vaping. If you need help quitting, talk to your doctor about stop-smoking programs and medicines. These can increase your chances of quitting for good. Quitting is one of the most important things you can do to protect your heart. It is never too late to quit. Try to avoid secondhand smoke too.     Stay at a weight that's healthy for you. Talk to your doctor if you need help losing weight.     Try to get 7 to 9 hours of sleep each night.     Limit alcohol to 2 drinks a day for men and 1 drink a day for women. Too much alcohol can cause health problems.     Manage other health  ESRD on dialysis

## 2025-07-15 ENCOUNTER — TRANSCRIPTION ENCOUNTER (OUTPATIENT)
Age: 70
End: 2025-07-15

## 2025-07-15 ENCOUNTER — OUTPATIENT (OUTPATIENT)
Dept: OUTPATIENT SERVICES | Facility: HOSPITAL | Age: 70
LOS: 1 days | End: 2025-07-15
Payer: MEDICARE

## 2025-07-15 ENCOUNTER — APPOINTMENT (OUTPATIENT)
Dept: HEPATOLOGY | Facility: HOSPITAL | Age: 70
End: 2025-07-15

## 2025-07-15 VITALS
DIASTOLIC BLOOD PRESSURE: 70 MMHG | WEIGHT: 205.03 LBS | SYSTOLIC BLOOD PRESSURE: 160 MMHG | HEART RATE: 65 BPM | TEMPERATURE: 97 F | RESPIRATION RATE: 18 BRPM | OXYGEN SATURATION: 96 %

## 2025-07-15 VITALS
RESPIRATION RATE: 18 BRPM | SYSTOLIC BLOOD PRESSURE: 133 MMHG | HEART RATE: 62 BPM | OXYGEN SATURATION: 98 % | DIASTOLIC BLOOD PRESSURE: 63 MMHG

## 2025-07-15 DIAGNOSIS — Z98.49 CATARACT EXTRACTION STATUS, UNSPECIFIED EYE: Chronic | ICD-10-CM

## 2025-07-15 DIAGNOSIS — Z94.0 KIDNEY TRANSPLANT STATUS: Chronic | ICD-10-CM

## 2025-07-15 DIAGNOSIS — K74.60 UNSPECIFIED CIRRHOSIS OF LIVER: ICD-10-CM

## 2025-07-15 DIAGNOSIS — Z87.898 PERSONAL HISTORY OF OTHER SPECIFIED CONDITIONS: Chronic | ICD-10-CM

## 2025-07-15 DIAGNOSIS — Z89.429 ACQUIRED ABSENCE OF OTHER TOE(S), UNSPECIFIED SIDE: Chronic | ICD-10-CM

## 2025-07-15 DIAGNOSIS — Z98.890 OTHER SPECIFIED POSTPROCEDURAL STATES: Chronic | ICD-10-CM

## 2025-07-15 DIAGNOSIS — Z98.84 BARIATRIC SURGERY STATUS: Chronic | ICD-10-CM

## 2025-07-15 LAB — GLUCOSE BLDC GLUCOMTR-MCNC: 134 MG/DL — HIGH (ref 70–99)

## 2025-07-15 PROCEDURE — 43235 EGD DIAGNOSTIC BRUSH WASH: CPT

## 2025-07-15 PROCEDURE — 82962 GLUCOSE BLOOD TEST: CPT

## 2025-07-15 RX ORDER — BIOTIN 10 MG
0 TABLET ORAL
Refills: 0 | DISCHARGE

## 2025-07-15 RX ORDER — AMLODIPINE BESYLATE 10 MG/1
0 TABLET ORAL
Refills: 0 | DISCHARGE

## 2025-07-15 RX ORDER — DOCUSATE SODIUM 100 MG
0 CAPSULE ORAL
Refills: 0 | DISCHARGE

## 2025-07-15 RX ORDER — ROSUVASTATIN CALCIUM 20 MG/1
0 TABLET, FILM COATED ORAL
Refills: 0 | DISCHARGE

## 2025-07-15 RX ORDER — COPPER 313.4 MG/1
0 INTRAUTERINE DEVICE INTRAUTERINE
Refills: 0 | DISCHARGE

## 2025-07-15 RX ORDER — SEMAGLUTIDE 1 MG/.5ML
0 INJECTION, SOLUTION SUBCUTANEOUS
Refills: 0 | DISCHARGE

## 2025-07-15 RX ORDER — INSULIN ASPART 100 [IU]/ML
0 INJECTION, SOLUTION INTRAVENOUS; SUBCUTANEOUS
Refills: 0 | DISCHARGE

## 2025-07-15 RX ORDER — MELATONIN 2.5MG/10ML
LIQUID (ML) ORAL
Refills: 0 | DISCHARGE

## 2025-07-15 RX ORDER — FLUTICASONE FUROATE, UMECLIDINIUM BROMIDE AND VILANTEROL TRIFENATATE 100; 62.5; 25 UG/1; UG/1; UG/1
0 POWDER RESPIRATORY (INHALATION)
Refills: 0 | DISCHARGE

## 2025-07-15 RX ORDER — FOLIC ACID 1 MG/1
0 TABLET ORAL
Refills: 0 | DISCHARGE

## 2025-07-15 RX ADMIN — Medication 30 MILLILITER(S): at 10:19

## 2025-07-15 NOTE — ASU PATIENT PROFILE, ADULT - NSICDXPASTSURGICALHX_GEN_ALL_CORE_FT
PAST SURGICAL HISTORY:  H/O kidney transplant     H/O laparoscopic adjustable gastric banding ' 2010    History of amputation of toe     History of foreign body aspiration 7/2018  from Left Ear    S/P cataract extraction '08 and ' 2011   Bilateral    S/P partial thyroidectomy '95  benign

## 2025-07-15 NOTE — ASU DISCHARGE PLAN (ADULT/PEDIATRIC) - FINANCIAL ASSISTANCE
[facility] provides services at a reduced cost to those who are determined to be eligible through [facility]’s financial assistance program. Information regarding [facility]’s financial assistance program can be found by going to [email or web address] or by calling [phone].

## 2025-07-15 NOTE — PRE-ANESTHESIA EVALUATION ADULT - NSANTHRISKNONERD_GEN_ALL_CORE
patient today    Objective:   Physical Exam   Constitutional: He is oriented to person, place, and time. He appears well-developed and well-nourished. He does not have a sickly appearance. No distress. HENT:   Head: Normocephalic and atraumatic. Mouth/Throat: No oropharyngeal exudate. Eyes: No scleral icterus. Neck: No JVD present. Cardiovascular: Normal rate and normal heart sounds. Pulmonary/Chest: Effort normal. No respiratory distress. He has wheezes. He has no rales. Abdominal: Soft. Bowel sounds are normal. He exhibits no distension and no mass. There is no tenderness. There is no guarding. Musculoskeletal: He exhibits no edema. Neurological: He is alert and oriented to person, place, and time. Coordination normal.   Skin: Skin is warm and dry. No rash noted. He is not diaphoretic. Psychiatric: He has a normal mood and affect. His behavior is normal.   Vitals reviewed. /68 (Site: Left Arm, Position: Sitting, Cuff Size: Medium Adult)   Pulse 88 Comment: Regular  Resp 16   Ht 5' 8\" (1.727 m)   Wt 173 lb (78.5 kg)   BMI 26.30 kg/m²     Assessment:           ICD-10-CM ICD-9-CM    1. Type 2 diabetes mellitus without complication, without long-term current use of insulin ([facility]) E11.9 250.00 LIPID PANEL      MICROALBUMIN / CREATININE URINE RATIO      HEMOGLOBIN A1C      RENAL FUNCTION PANEL      metFORMIN (GLUCOPHAGE) 500 MG tablet   2. Essential hypertension I10 401.9 RENAL FUNCTION PANEL      amLODIPine (NORVASC) 5 MG tablet      lisinopril (PRINIVIL;ZESTRIL) 20 MG tablet   3. Hyperlipidemia, unspecified hyperlipidemia type E78.5 272.4 atorvastatin (LIPITOR) 80 MG tablet   4. Erectile dysfunction due to arterial insufficiency N52.01 607.84    5. Benign prostatic hyperplasia with nocturia N40.1 600.01     R35.1 788.43    6.  Type 2 diabetes mellitus with microalbuminuria, without long-term current use of insulin ([facility]) E11.29 250.40 aspirin EC 81 MG EC tablet    R80.9 791.0    7. No risk alerts present

## 2025-07-15 NOTE — PRE-ANESTHESIA EVALUATION ADULT - NSANTHPMHFT_GEN_ALL_CORE
Denies CP, SOB, SEALS, reflux symptoms. METS >4  hx of gastric band 2010. Pt also endorses hx of asthma diagnosed 4 years ago. Uses trelegy daily  since kidney transplant in 2024, has not needed HD (left arm fistula)

## 2025-07-15 NOTE — PRE PROCEDURE NOTE - PRE PROCEDURE EVALUATION
Attending Physician:  Dr. Obi Feliciano                        Procedure:  EGD    Indication for Procedure:  Screening for esophageal varices (compensated cirrhosis)  ________________________________________________________  PAST MEDICAL & SURGICAL HISTORY:  Hard of hearing  Bilateral hearing Aids      Type 2 diabetes mellitus      HTN (hypertension)      Hypothyroidism      Osteomyelitis (resolved)      ESRD on dialysis, now s/p kidney transplant (3/25/24)      S/P partial thyroidectomy  '95  benign      H/O laparoscopic adjustable gastric banding  ' 2010      S/P cataract extraction  '08 and ' 2011   Bilateral      History of foreign body aspiration  7/2018  from Left Ear      History of amputation of toe        ALLERGIES:  Vasotec (Unknown)  Enalaprilat (Unknown)  MetFORMIN Hydrochloride (Unknown)  Novocain (Other)  aspirin (Unknown)  Enalapril Maleate (Unknown)  capsicum (Unknown)    HOME MEDICATIONS:  Albuterol (Eqv-Proventil HFA) 90 mcg/inh inhalation aerosol: 2 puff(s) inhaled every 6 hours, As Needed  famotidine 20 mg oral tablet: 1 tab(s) orally once a day  levothyroxine 125 mcg (0.125 mg) oral capsule: 1 cap(s) orally once a day (at bedtime)  mycophenolate mofetil 500 mg oral tablet: 1,000 milligram(s) orally 2 times a day  predniSONE: 5 milligram(s) orally once a day Please follow the taper provided by the transplant team  pyridoxine 50 mg oral tablet: 1 tab(s) orally once a day  senna leaf extract oral tablet: 2 tab(s) orally once a day (at bedtime) as needed  sulfamethoxazole-trimethoprim 400 mg-80 mg oral tablet: 1 tab(s) orally once a day  tacrolimus 1 mg oral tablet, extended release: 2 tab(s) orally once a day  amlodipine 5 mg po daily  metoprolol tartrate 25 mg po bid  magnesium oxide 400 mg po daily  rosuvastatin 5 mg po daily  biotin 1 tab po daily  cinnamon 1 capsule po daily  folic acid 1600 mcg po daily  multivitamin 1 tab po daily  novolog 70/30 mix subcutaneously as directed  vitamin D 2000 IU po daily    AICD/PPM: [ ] yes   [X] no    PERTINENT LAB DATA:  Reviewed in HIE      PHYSICAL EXAMINATION:    T(C): --  HR: --  BP: --  RR: --  SpO2: --    Constitutional: NAD    Neck:  No JVD  Respiratory: CTAB/L  Cardiovascular: S1 and S2  Gastrointestinal: BS+, soft, NT/ND  Extremities: No peripheral edema  Neurological: A/O x 3, no focal deficits      COMMENTS:    The patient is a suitable candidate for the planned procedure unless box checked [ ]  No, explain:

## 2025-07-15 NOTE — ASU PATIENT PROFILE, ADULT - FALL HARM RISK - HARM RISK INTERVENTIONS

## 2025-09-03 ENCOUNTER — OUTPATIENT (OUTPATIENT)
Dept: OUTPATIENT SERVICES | Facility: HOSPITAL | Age: 70
LOS: 1 days | End: 2025-09-03
Payer: MEDICARE

## 2025-09-03 ENCOUNTER — APPOINTMENT (OUTPATIENT)
Dept: ULTRASOUND IMAGING | Facility: CLINIC | Age: 70
End: 2025-09-03
Payer: MEDICARE

## 2025-09-03 DIAGNOSIS — K74.60 UNSPECIFIED CIRRHOSIS OF LIVER: ICD-10-CM

## 2025-09-03 DIAGNOSIS — Z87.898 PERSONAL HISTORY OF OTHER SPECIFIED CONDITIONS: Chronic | ICD-10-CM

## 2025-09-03 DIAGNOSIS — Z98.84 BARIATRIC SURGERY STATUS: Chronic | ICD-10-CM

## 2025-09-03 DIAGNOSIS — Z98.49 CATARACT EXTRACTION STATUS, UNSPECIFIED EYE: Chronic | ICD-10-CM

## 2025-09-03 DIAGNOSIS — Z89.429 ACQUIRED ABSENCE OF OTHER TOE(S), UNSPECIFIED SIDE: Chronic | ICD-10-CM

## 2025-09-03 DIAGNOSIS — Z98.890 OTHER SPECIFIED POSTPROCEDURAL STATES: Chronic | ICD-10-CM

## 2025-09-03 DIAGNOSIS — Z94.0 KIDNEY TRANSPLANT STATUS: Chronic | ICD-10-CM

## 2025-09-03 PROCEDURE — 76700 US EXAM ABDOM COMPLETE: CPT

## 2025-09-03 PROCEDURE — 76700 US EXAM ABDOM COMPLETE: CPT | Mod: 26

## 2025-09-05 ENCOUNTER — NON-APPOINTMENT (OUTPATIENT)
Age: 70
End: 2025-09-05

## 2025-09-10 ENCOUNTER — NON-APPOINTMENT (OUTPATIENT)
Age: 70
End: 2025-09-10

## 2025-09-11 ENCOUNTER — APPOINTMENT (OUTPATIENT)
Dept: DERMATOLOGY | Facility: CLINIC | Age: 70
End: 2025-09-11

## 2025-09-11 DIAGNOSIS — D22.9 MELANOCYTIC NEVI, UNSPECIFIED: ICD-10-CM

## 2025-09-11 DIAGNOSIS — D18.01 HEMANGIOMA OF SKIN AND SUBCUTANEOUS TISSUE: ICD-10-CM

## 2025-09-11 DIAGNOSIS — D48.9 NEOPLASM OF UNCERTAIN BEHAVIOR, UNSPECIFIED: ICD-10-CM

## 2025-09-11 DIAGNOSIS — D48.5 NEOPLASM OF UNCERTAIN BEHAVIOR OF SKIN: ICD-10-CM

## 2025-09-11 PROCEDURE — 99213 OFFICE O/P EST LOW 20 MIN: CPT | Mod: 25

## 2025-09-11 PROCEDURE — 11102 TANGNTL BX SKIN SINGLE LES: CPT

## 2025-09-17 LAB — DERMATOLOGY BIOPSY: NORMAL

## (undated) DEVICE — DRAPE TOWEL BLUE 17" X 24"

## (undated) DEVICE — SUT SOFSILK 4-0 30" TIES

## (undated) DEVICE — Device

## (undated) DEVICE — SYR LUER LOK 50CC

## (undated) DEVICE — SUT PDS II 1 36" CTX

## (undated) DEVICE — WARMING BLANKET UPPER ADULT

## (undated) DEVICE — SUT SOFSILK 4-0 18" V-20

## (undated) DEVICE — SUT SOFSILK 3-0 30" TIES

## (undated) DEVICE — SUT PDS II PLUS 4-0 27" SH

## (undated) DEVICE — PREP BETADINE KIT

## (undated) DEVICE — TUBING SUCTION CONN 6FT STERILE

## (undated) DEVICE — STAPLER SKIN VISI-STAT 35 WIDE

## (undated) DEVICE — GOWN TRIMAX LG

## (undated) DEVICE — WARMING BLANKET LOWER ADULT

## (undated) DEVICE — FOLEY HOLDER STATLOCK 2 WAY ADULT

## (undated) DEVICE — CATH IV SAFE BC 20G X 1.16" (PINK)

## (undated) DEVICE — SUT SOFSILK 2-0 18" TIES

## (undated) DEVICE — SOL INJ NS 0.9% 500ML 2 PORT

## (undated) DEVICE — PACK MAJOR ABDOMINAL SUPINE

## (undated) DEVICE — PACK BASIN SPECIAL PROCEDURE

## (undated) DEVICE — PACK BASIC GOWN

## (undated) DEVICE — BALLOON US ENDO

## (undated) DEVICE — SOL IRR POUR H2O 250ML

## (undated) DEVICE — SOL IRR POUR NS 0.9% 500ML

## (undated) DEVICE — TUBING IV SET GRAVITY 3Y 100" MACRO

## (undated) DEVICE — DRAPE LIGHT HANDLE COVER (GREEN)

## (undated) DEVICE — FORCEP OLYMPUS GRASPING SHARP TOOTH 2MM CHANNEL 9.8MM OPENING

## (undated) DEVICE — GLV 7.5 PROTEXIS (WHITE)

## (undated) DEVICE — PACK CYSTO

## (undated) DEVICE — TUBING SUCTION 20FT

## (undated) DEVICE — DRAPE GENERAL ENDOSCOPY

## (undated) DEVICE — PREP CHLORAPREP HI-LITE ORANGE 26ML

## (undated) DEVICE — STAPLER COVIDIEN ENDO GIA SHORT HANDLE

## (undated) DEVICE — VESSEL LOOP MAXI-RED  0.120" X 16"

## (undated) DEVICE — SUT SOFSILK 4-0 18" TIES

## (undated) DEVICE — SUT POLYSORB 2-0 30" V-20 UNDYED

## (undated) DEVICE — DRAIN PENROSE .25" X 18" LATEX

## (undated) DEVICE — AORTIC PUNCH 5MM STANDARD HANDLE

## (undated) DEVICE — GLV COTTON DEROYAL XL

## (undated) DEVICE — SENSOR O2 FINGER ADULT

## (undated) DEVICE — BITE BLOCK ADULT 20 X 27MM (GREEN)

## (undated) DEVICE — PACK IV START WITH CHG

## (undated) DEVICE — SUT SOFSILK 2-0 30" TIES

## (undated) DEVICE — SUT PDS II PLUS 5-0 30" RB-1

## (undated) DEVICE — SUT MONOCRYL 4-0 18" PS-2

## (undated) DEVICE — DRAPE 3/4 SHEET W REINFORCEMENT 56X77"

## (undated) DEVICE — SYR LUER LOK 10CC

## (undated) DEVICE — NDL HYPO SAFE 25G X 1.5" (ORANGE)

## (undated) DEVICE — BAG DECANTER DISP

## (undated) DEVICE — SUT POLYSORB 3-0 30" V-20 UNDYED

## (undated) DEVICE — SUT BOOT STANDARD (ASSORTED) 5 PAIR

## (undated) DEVICE — DRAPE SLUSH / WARMER 44 X 66"

## (undated) DEVICE — SYR ALLIANCE II INFLATION 60ML

## (undated) DEVICE — DRAIN RESERVOIR FOR JACKSON PRATT 100CC CARDINAL

## (undated) DEVICE — ELCTR BOVIE PENCIL SMOKE EVACUATION

## (undated) DEVICE — FOLEY TRAY 16FR LF URINE METER SURESTEP

## (undated) DEVICE — SUT SOFSILK 3-0 18" TIES

## (undated) DEVICE — DRAPE 1/2 SHEET 40X57"

## (undated) DEVICE — TUBING TRUWAVE PRESSURE MALE/FEMALE 72"

## (undated) DEVICE — CLAMP ALLIGATOR (SINGLE JAW) 3FR X 115CM

## (undated) DEVICE — SYR LUER LOK 20CC

## (undated) DEVICE — AORTIC PUNCH 4.8 MM LONG HANDLE

## (undated) DEVICE — SUCTION YANKAUER NO CONTROL VENT

## (undated) DEVICE — SUT PROLENE 6-0 30" C-1

## (undated) DEVICE — DRSG SURG OPSITE 10X4"

## (undated) DEVICE — VENODYNE/SCD SLEEVE CALF MEDIUM

## (undated) DEVICE — GOWN XL EXTRA LONG

## (undated) DEVICE — SUT SOFSILK 2-0 18" V-20 (POP-OFF)

## (undated) DEVICE — CATH IV SAFE BC 22G X 1" (BLUE)

## (undated) DEVICE — SPECIMEN CONTAINER 100ML